# Patient Record
Sex: FEMALE | Race: BLACK OR AFRICAN AMERICAN | NOT HISPANIC OR LATINO | Employment: OTHER | URBAN - NONMETROPOLITAN AREA
[De-identification: names, ages, dates, MRNs, and addresses within clinical notes are randomized per-mention and may not be internally consistent; named-entity substitution may affect disease eponyms.]

---

## 2017-01-11 ENCOUNTER — TELEPHONE (OUTPATIENT)
Dept: GASTROENTEROLOGY | Facility: CLINIC | Age: 72
End: 2017-01-11

## 2017-01-11 NOTE — TELEPHONE ENCOUNTER
----- Message from [facility] sent at 12/30/2016 12:23 PM CST -----  Regarding: RECALL  SPOKE WITH PT. SHE IS OUT OF TOWN RIGHT NOW AND WILL CALL WHEN SHE RETURNS TO SET UP AN OV.

## 2017-04-18 ENCOUNTER — OFFICE VISIT (OUTPATIENT)
Dept: GASTROENTEROLOGY | Facility: CLINIC | Age: 72
End: 2017-04-18

## 2017-04-18 VITALS
HEART RATE: 70 BPM | HEIGHT: 65 IN | WEIGHT: 275 LBS | DIASTOLIC BLOOD PRESSURE: 62 MMHG | SYSTOLIC BLOOD PRESSURE: 112 MMHG | TEMPERATURE: 97 F | BODY MASS INDEX: 45.82 KG/M2 | OXYGEN SATURATION: 97 %

## 2017-04-18 DIAGNOSIS — K63.5 COLON POLYPS: Primary | ICD-10-CM

## 2017-04-18 DIAGNOSIS — Z86.718 HISTORY OF DVT IN ADULTHOOD: ICD-10-CM

## 2017-04-18 DIAGNOSIS — R10.13 EPIGASTRIC PAIN: ICD-10-CM

## 2017-04-18 PROBLEM — K62.1 ANORECTAL POLYP: Status: RESOLVED | Noted: 2017-04-18 | Resolved: 2017-04-18

## 2017-04-18 PROBLEM — K21.9 GASTROESOPHAGEAL REFLUX DISEASE WITHOUT ESOPHAGITIS: Status: RESOLVED | Noted: 2017-04-18 | Resolved: 2017-04-18

## 2017-04-18 PROBLEM — K62.0 ANORECTAL POLYP: Status: ACTIVE | Noted: 2017-04-18

## 2017-04-18 PROBLEM — K62.1 ANORECTAL POLYP: Status: ACTIVE | Noted: 2017-04-18

## 2017-04-18 PROBLEM — K21.9 GASTROESOPHAGEAL REFLUX DISEASE WITHOUT ESOPHAGITIS: Status: ACTIVE | Noted: 2017-04-18

## 2017-04-18 PROBLEM — K62.0 ANORECTAL POLYP: Status: RESOLVED | Noted: 2017-04-18 | Resolved: 2017-04-18

## 2017-04-18 PROCEDURE — 99214 OFFICE O/P EST MOD 30 MIN: CPT | Performed by: NURSE PRACTITIONER

## 2017-04-18 RX ORDER — SODIUM, POTASSIUM,MAG SULFATES 17.5-3.13G
2 SOLUTION, RECONSTITUTED, ORAL ORAL ONCE
Qty: 2 BOTTLE | Refills: 0 | Status: SHIPPED | OUTPATIENT
Start: 2017-04-18 | End: 2017-04-18

## 2017-04-18 RX ORDER — HYDROCHLOROTHIAZIDE 25 MG/1
25 TABLET ORAL AS NEEDED
Status: ON HOLD | COMMUNITY
End: 2017-05-31

## 2017-04-18 RX ORDER — FERROUS SULFATE 325(65) MG
325 TABLET ORAL
Status: ON HOLD | COMMUNITY
End: 2017-05-31

## 2017-04-18 NOTE — PROGRESS NOTES
Chief Complaint:   Chief Complaint   Patient presents with   • Colon Cancer Screening     Patient is here today for colon screening. Patient is also having stomach issues too and wanted to have endo as well.         Patient ID: Karen Snell is a 72 y.o. female     History of Present Illness:  This is a very pleasant 72-year-old female who has a history of colon polyps and is due for repeat colonoscopy.  She states that she has not had any diarrhea, constipation, melena or hematochezia.  She denies any fever or chills.  She denies any unintentional weight loss or loss of appetite.  The patient's last colonoscopy was performed on 9/24/13 with colon polyp finding.    The patient also states today that she has been having mid epigastric pain.  She states that she notices that it seems worse if she does not eat.  She states that she feels as though eating helps to stop the pain that she describes as somewhat burning.  She denies any indigestion.  She denies any dysphagia, nausea, vomiting or hematemesis.    Past Medical History:   Diagnosis Date   • Colon polyp    • DVT (deep venous thrombosis)    • GERD (gastroesophageal reflux disease)    • Hypertension    • PE (pulmonary thromboembolism)        Past Surgical History:   Procedure Laterality Date   • CHOLECYSTECTOMY     • COLONOSCOPY  09/24/2013   • JOINT REPLACEMENT           Current Outpatient Prescriptions:   •  buPROPion (WELLBUTRIN) 100 MG tablet, Take 300 mg by mouth 2 (Two) Times a Day., Disp: , Rfl:   •  citalopram (CeleXA) 20 MG tablet, Take 20 mg by mouth Daily., Disp: , Rfl:   •  donepezil (ARICEPT) 10 MG tablet, Take 10 mg by mouth Every Night., Disp: , Rfl:   •  ferrous sulfate 325 (65 FE) MG tablet, Take 325 mg by mouth Daily With Breakfast., Disp: , Rfl:   •  hydrochlorothiazide (HYDRODIURIL) 25 MG tablet, Take 25 mg by mouth As Needed., Disp: , Rfl:   •  nabumetone (RELAFEN) 500 MG tablet, Take 500 mg by mouth 2 (Two) Times a Day As Needed for mild  "pain (1-3)., Disp: , Rfl:   •  pravastatin (PRAVACHOL) 40 MG tablet, Take 40 mg by mouth Daily., Disp: , Rfl:   •  rivaroxaban (XARELTO) 20 MG tablet, Take 20 mg by mouth Daily., Disp: , Rfl:   •  sodium-potassium-magnesium sulfates (SUPREP BOWEL PREP) solution oral solution, Take 2 bottles by mouth 1 (One) Time for 1 dose. Split dose prep as directed by office instructions provided.  2 bottles = one kit., Disp: 2 bottle, Rfl: 0    No Known Allergies    Social History     Social History   • Marital status: Single     Spouse name: N/A   • Number of children: N/A   • Years of education: N/A     Occupational History   • Not on file.     Social History Main Topics   • Smoking status: Never Smoker   • Smokeless tobacco: Not on file   • Alcohol use No   • Drug use: No   • Sexual activity: Defer     Other Topics Concern   • Not on file     Social History Narrative       Family History   Problem Relation Age of Onset   • Breast cancer Neg Hx        Vitals:    04/18/17 1016   BP: 112/62   Pulse: 70   Temp: 97 °F (36.1 °C)   SpO2: 97%   Weight: 275 lb (125 kg)   Height: 65\" (165.1 cm)       Review of Systems:    General:    Present -feeling well   Skin:    Not Present-Rash   HEENT:     Not Present-Acute visual changes or Acute hearing changes   Neck :    Not Present- swollen glands   Genitourinary:      Not Present- burning, frequency, urgency hematuria, dysuria,   Cardiovascular:   Not Present-chest pain, palpitations, or pressure   Respiratory:   Not Present- shortness of breath or cough   Gastrointestinal:  Musculoskeletal:  Neurological:  Psychiatric:   Present as mentioned in the HP    Not Present. Recent gait disturbances.    Not Present-Seizures and weakness in extremities.    Not Present- Anxiety or Depression.       Physical Exam:    General Appearance:    Alert, cooperative, in no acute distress   Psych:    Mood appropriate    Eyes:          conjunctivae and sclerae normal, no   icterus, no pallor   ENMT:    Ears " appear intact with no abnormalities noted oral mucosa moist   Neck:   No adenopathy, supple, trachea midline, no thyromegaly, no   carotid bruit, no JVD    Cardiovascular:    Regular rhythm and normal rate, normal S1 and S2, no            murmur, no gallop, no rub, no click   Gastrointestinal:     Inspection normal.  Normal bowel sounds, no masses, no organomegaly, soft round non-tender, non-distended, no guarding, no rebound or tenderness. No hepatosplenomegaly.   Skin:   No bleeding, bruising or rash   Neurologic:   nonfocal       Lab Results   Component Value Date    WBC 6.34 10/30/2016    WBC 7.43 08/20/2016    WBC 7.23 08/17/2016    HGB 11.3 (L) 10/30/2016    HGB 9.5 (L) 08/20/2016    HGB 9.1 (L) 08/17/2016    HCT 33.8 (L) 10/30/2016    HCT 28.9 (L) 08/20/2016    HCT 27.3 (L) 08/17/2016     10/30/2016     08/20/2016     08/17/2016        Lab Results   Component Value Date    .9 10/30/2016     10/30/2016     08/20/2016    K 4.2 10/30/2016    K 4.2 08/20/2016    K 4.3 08/17/2016     10/30/2016     08/20/2016     08/17/2016    CO2 29.0 10/30/2016    CO2 28 08/20/2016    CO2 28 08/17/2016    BUN 14 10/30/2016    BUN 16 08/20/2016    BUN 14 08/17/2016    CREATININE 0.92 10/30/2016    CREATININE 0.99 08/20/2016    CREATININE 0.97 08/17/2016    BILITOT 0.5 10/30/2016    BILITOT 0.7 08/08/2016    BILITOT 0.4 02/29/2016    ALKPHOS 103 10/30/2016    ALKPHOS 118 08/08/2016    ALKPHOS 99 02/29/2016    ALT 22 10/30/2016    ALT 21 08/08/2016    ALT 20 02/29/2016    AST 21 10/30/2016    AST 23 08/08/2016    AST 38 02/29/2016    GLUCOSE 123 (H) 10/30/2016    GLUCOSE 94 08/20/2016    GLUCOSE 94 08/17/2016       Lab Results   Component Value Date    INR 1.51 (H) 10/30/2016    INR 1.03 08/08/2016    INR 1.01 02/29/2016       Assessment and Plan:    Karen was seen today for colon cancer screening.    Diagnoses and all orders for this visit:    Colon polyps  -     Case  Request; Standing colonoscopy     Epigastric pain  -     Case Request; Standing upper endoscopy    History of DVT in adulthood  Comments:  will need to hold [facility] Giordano    Other orders  -     Implement Anesthesia Orders Day of Procedure; Standing  -     Obtain Informed Consent; Standing  -     Verify Informed Consent; Standing  -     Verify bowel prep was successful; Standing  -     sodium-potassium-magnesium sulfates (SUPREP BOWEL PREP) solution oral solution; Take 2 bottles by mouth 1 (One) Time for 1 dose. Split dose prep as directed by office instructions provided.  2 bottles = one kit.      There are no Patient Instructions on file for this visit.    Next follow-up appointment   The risks, benefits, and alternatives of endoscopy were reviewed with the patient today.  Risks including perforation, with or without dilation, possibly requiring surgery.  Additional risks include risk of bleeding.  There is also the risk of a drug reaction or problems with anesthesia.  This will be discussed with the further by the anesthesia team on the day of the procedure. The benefits include the diagnosis and management of disease of the upper digestive tract.  Alternatives to endoscopy include upper GI series, laboratory testing, radiographic evaluation, or no intervention.  The patient verbalizes understanding and agrees.    The risks, benefits, and alternatives of colonoscopy were reviewed with the patient today.  Risks including perforation of the colon possibly requiring surgery or colostomy.  Additional risks include risk of bleeding from biopsies or removal of colon tissue.  There is also the risk of a drug reaction or problems with anesthesia.  This will be discussed with the further by the anesthesia team on the day of the procedure.  Lastly there is a possibility of missing a colon polyp or cancer.  The benefits include the diagnosis and management of disease of the colon and rectum.  Alternatives  to colonoscopy include barium enema, laboratory testing, radiographic evaluation, or no intervention.  The patient verbalizes understanding and agrees.    EMR Dragon/Transcription disclaimer:  Much of this encounter note is an electronic transcription/translation of spoken language to printed text. The electronic translation of spoken language may permit erroneous, or at times, nonsensical words or phrases to be inadvertently transcribed; although I have reviewed the note for such errors, some may still exist.

## 2017-04-19 ENCOUNTER — TELEPHONE (OUTPATIENT)
Dept: GASTROENTEROLOGY | Facility: CLINIC | Age: 72
End: 2017-04-19

## 2017-04-19 NOTE — TELEPHONE ENCOUNTER
I received Lorenzoto clearance from BJ[facility] Walton. I have scanned this to you under media in her chart.

## 2017-04-19 NOTE — TELEPHONE ENCOUNTER
OK to schedule procedure now that clearance to hold antiplatelet/anticoagulatant has been obtained.    Jenna Page MD

## 2017-04-24 NOTE — TELEPHONE ENCOUNTER
Scheduled endo/colon for 5/31. Explained that the last dose of Xarelto will be 5/29. Mailed prep instructions to her.

## 2017-05-31 ENCOUNTER — HOSPITAL ENCOUNTER (OUTPATIENT)
Facility: HOSPITAL | Age: 72
Setting detail: OBSERVATION
Discharge: HOME | End: 2017-06-01
Attending: INTERNAL MEDICINE | Admitting: INTERNAL MEDICINE

## 2017-05-31 ENCOUNTER — HOSPITAL ENCOUNTER (OUTPATIENT)
Facility: HOSPITAL | Age: 72
Setting detail: HOSPITAL OUTPATIENT SURGERY
Discharge: HOME | End: 2017-05-31
Attending: INTERNAL MEDICINE | Admitting: INTERNAL MEDICINE

## 2017-05-31 ENCOUNTER — TELEPHONE (OUTPATIENT)
Dept: GASTROENTEROLOGY | Facility: CLINIC | Age: 72
End: 2017-05-31

## 2017-05-31 ENCOUNTER — APPOINTMENT (OUTPATIENT)
Dept: GENERAL RADIOLOGY | Facility: HOSPITAL | Age: 72
End: 2017-05-31

## 2017-05-31 ENCOUNTER — APPOINTMENT (OUTPATIENT)
Dept: CT IMAGING | Facility: HOSPITAL | Age: 72
End: 2017-05-31

## 2017-05-31 VITALS
RESPIRATION RATE: 20 BRPM | HEIGHT: 64 IN | DIASTOLIC BLOOD PRESSURE: 107 MMHG | SYSTOLIC BLOOD PRESSURE: 146 MMHG | BODY MASS INDEX: 46.61 KG/M2 | HEART RATE: 95 BPM | OXYGEN SATURATION: 96 % | WEIGHT: 273 LBS | TEMPERATURE: 96.8 F

## 2017-05-31 DIAGNOSIS — R41.89 COGNITIVE AND BEHAVIORAL CHANGES: ICD-10-CM

## 2017-05-31 DIAGNOSIS — R46.89 COGNITIVE AND BEHAVIORAL CHANGES: ICD-10-CM

## 2017-05-31 DIAGNOSIS — R41.82 ALTERED MENTAL STATUS, UNSPECIFIED ALTERED MENTAL STATUS TYPE: Primary | ICD-10-CM

## 2017-05-31 LAB
ALBUMIN SERPL-MCNC: 4.2 G/DL (ref 3.5–5)
ALBUMIN/GLOB SERPL: 1.2 G/DL (ref 1.1–2.5)
ALP SERPL-CCNC: 115 U/L (ref 24–120)
ALT SERPL W P-5'-P-CCNC: 19 U/L (ref 0–54)
AMYLASE SERPL-CCNC: 87 U/L (ref 30–110)
ANION GAP SERPL CALCULATED.3IONS-SCNC: 13 MMOL/L (ref 4–13)
APTT PPP: 30.3 SECONDS (ref 24.1–34.8)
AST SERPL-CCNC: 37 U/L (ref 7–45)
BACTERIA UR QL AUTO: ABNORMAL /HPF
BASOPHILS # BLD AUTO: 0.02 10*3/MM3 (ref 0–0.2)
BASOPHILS NFR BLD AUTO: 0.4 % (ref 0–2)
BILIRUB SERPL-MCNC: 0.9 MG/DL (ref 0.1–1)
BILIRUB UR QL STRIP: NEGATIVE
BUN BLD-MCNC: 11 MG/DL (ref 5–21)
BUN/CREAT SERPL: 11.3 (ref 7–25)
CALCIUM SPEC-SCNC: 9.7 MG/DL (ref 8.4–10.4)
CHLORIDE SERPL-SCNC: 104 MMOL/L (ref 98–110)
CLARITY UR: CLEAR
CO2 SERPL-SCNC: 25 MMOL/L (ref 24–31)
COLOR UR: YELLOW
CREAT BLD-MCNC: 0.97 MG/DL (ref 0.5–1.4)
CRP SERPL-MCNC: <0.5 MG/DL (ref 0–0.99)
DEPRECATED RDW RBC AUTO: 38.9 FL (ref 40–54)
EOSINOPHIL # BLD AUTO: 0.09 10*3/MM3 (ref 0–0.7)
EOSINOPHIL NFR BLD AUTO: 1.6 % (ref 0–4)
ERYTHROCYTE [DISTWIDTH] IN BLOOD BY AUTOMATED COUNT: 13.5 % (ref 12–15)
GFR SERPL CREATININE-BSD FRML MDRD: 69 ML/MIN/1.73
GLOBULIN UR ELPH-MCNC: 3.5 GM/DL
GLUCOSE BLD-MCNC: 105 MG/DL (ref 70–100)
GLUCOSE BLDC GLUCOMTR-MCNC: 112 MG/DL (ref 70–130)
GLUCOSE UR STRIP-MCNC: NEGATIVE MG/DL
HCT VFR BLD AUTO: 34.2 % (ref 37–47)
HGB BLD-MCNC: 11.5 G/DL (ref 12–16)
HGB UR QL STRIP.AUTO: ABNORMAL
HOLD SPECIMEN: NORMAL
HOLD SPECIMEN: NORMAL
HYALINE CASTS UR QL AUTO: ABNORMAL /LPF
IMM GRANULOCYTES # BLD: 0.03 10*3/MM3 (ref 0–0.03)
IMM GRANULOCYTES NFR BLD: 0.5 % (ref 0–5)
INR PPP: 0.97 (ref 0.91–1.09)
KETONES UR QL STRIP: NEGATIVE
LEUKOCYTE ESTERASE UR QL STRIP.AUTO: NEGATIVE
LIPASE SERPL-CCNC: 115 U/L (ref 23–203)
LYMPHOCYTES # BLD AUTO: 1.5 10*3/MM3 (ref 0.72–4.86)
LYMPHOCYTES NFR BLD AUTO: 27.3 % (ref 15–45)
MCH RBC QN AUTO: 26.9 PG (ref 28–32)
MCHC RBC AUTO-ENTMCNC: 33.6 G/DL (ref 33–36)
MCV RBC AUTO: 80.1 FL (ref 82–98)
MONOCYTES # BLD AUTO: 0.26 10*3/MM3 (ref 0.19–1.3)
MONOCYTES NFR BLD AUTO: 4.7 % (ref 4–12)
NEUTROPHILS # BLD AUTO: 3.59 10*3/MM3 (ref 1.87–8.4)
NEUTROPHILS NFR BLD AUTO: 65.5 % (ref 39–78)
NITRITE UR QL STRIP: NEGATIVE
NT-PROBNP SERPL-MCNC: 226 PG/ML (ref 0–900)
PH UR STRIP.AUTO: 7.5 [PH] (ref 5–8)
PLATELET # BLD AUTO: 229 10*3/MM3 (ref 130–400)
PMV BLD AUTO: 9.8 FL (ref 6–12)
POTASSIUM BLD-SCNC: 4.7 MMOL/L (ref 3.5–5.3)
PROT SERPL-MCNC: 7.7 G/DL (ref 6.3–8.7)
PROT UR QL STRIP: NEGATIVE
PROTHROMBIN TIME: 13.2 SECONDS (ref 11.9–14.6)
RBC # BLD AUTO: 4.27 10*6/MM3 (ref 4.2–5.4)
RBC # UR: ABNORMAL /HPF
REF LAB TEST METHOD: ABNORMAL
SODIUM BLD-SCNC: 142 MMOL/L (ref 135–145)
SP GR UR STRIP: <=1.005 (ref 1–1.03)
SQUAMOUS #/AREA URNS HPF: ABNORMAL /HPF
TROPONIN I SERPL-MCNC: <0.012 NG/ML (ref 0–0.03)
UROBILINOGEN UR QL STRIP: ABNORMAL
WBC NRBC COR # BLD: 5.49 10*3/MM3 (ref 4.8–10.8)
WBC UR QL AUTO: ABNORMAL /HPF
WHOLE BLOOD HOLD SPECIMEN: NORMAL

## 2017-05-31 PROCEDURE — G0463 HOSPITAL OUTPT CLINIC VISIT: HCPCS | Performed by: INTERNAL MEDICINE

## 2017-05-31 PROCEDURE — 93010 ELECTROCARDIOGRAM REPORT: CPT | Performed by: INTERNAL MEDICINE

## 2017-05-31 PROCEDURE — 83690 ASSAY OF LIPASE: CPT | Performed by: NURSE PRACTITIONER

## 2017-05-31 PROCEDURE — 96361 HYDRATE IV INFUSION ADD-ON: CPT

## 2017-05-31 PROCEDURE — G0378 HOSPITAL OBSERVATION PER HR: HCPCS

## 2017-05-31 PROCEDURE — 99285 EMERGENCY DEPT VISIT HI MDM: CPT

## 2017-05-31 PROCEDURE — 82962 GLUCOSE BLOOD TEST: CPT

## 2017-05-31 PROCEDURE — 96360 HYDRATION IV INFUSION INIT: CPT

## 2017-05-31 PROCEDURE — 85025 COMPLETE CBC W/AUTO DIFF WBC: CPT | Performed by: NURSE PRACTITIONER

## 2017-05-31 PROCEDURE — P9612 CATHETERIZE FOR URINE SPEC: HCPCS

## 2017-05-31 PROCEDURE — 80053 COMPREHEN METABOLIC PANEL: CPT | Performed by: NURSE PRACTITIONER

## 2017-05-31 PROCEDURE — 82150 ASSAY OF AMYLASE: CPT | Performed by: NURSE PRACTITIONER

## 2017-05-31 PROCEDURE — 70450 CT HEAD/BRAIN W/O DYE: CPT

## 2017-05-31 PROCEDURE — 25810000003 DEXTROSE-NACL PER 500 ML: Performed by: INTERNAL MEDICINE

## 2017-05-31 PROCEDURE — 86140 C-REACTIVE PROTEIN: CPT | Performed by: NURSE PRACTITIONER

## 2017-05-31 PROCEDURE — 93005 ELECTROCARDIOGRAM TRACING: CPT | Performed by: NURSE PRACTITIONER

## 2017-05-31 PROCEDURE — 71010 HC CHEST PA OR AP: CPT

## 2017-05-31 PROCEDURE — 84484 ASSAY OF TROPONIN QUANT: CPT | Performed by: INTERNAL MEDICINE

## 2017-05-31 PROCEDURE — 81001 URINALYSIS AUTO W/SCOPE: CPT | Performed by: NURSE PRACTITIONER

## 2017-05-31 PROCEDURE — 83880 ASSAY OF NATRIURETIC PEPTIDE: CPT | Performed by: NURSE PRACTITIONER

## 2017-05-31 PROCEDURE — 85730 THROMBOPLASTIN TIME PARTIAL: CPT | Performed by: NURSE PRACTITIONER

## 2017-05-31 PROCEDURE — 85610 PROTHROMBIN TIME: CPT | Performed by: NURSE PRACTITIONER

## 2017-05-31 RX ORDER — BUPROPION HYDROCHLORIDE 300 MG/1
150 TABLET ORAL DAILY
Status: ON HOLD | COMMUNITY
End: 2019-01-01

## 2017-05-31 RX ORDER — DONEPEZIL HYDROCHLORIDE 10 MG/1
10 TABLET, FILM COATED ORAL NIGHTLY
Status: DISCONTINUED | OUTPATIENT
Start: 2017-05-31 | End: 2017-06-01 | Stop reason: HOSPADM

## 2017-05-31 RX ORDER — BUPROPION HYDROCHLORIDE 150 MG/1
300 TABLET ORAL DAILY
Status: DISCONTINUED | OUTPATIENT
Start: 2017-05-31 | End: 2017-06-01 | Stop reason: HOSPADM

## 2017-05-31 RX ORDER — ATORVASTATIN CALCIUM 10 MG/1
10 TABLET, FILM COATED ORAL DAILY
Status: DISCONTINUED | OUTPATIENT
Start: 2017-06-01 | End: 2017-06-01 | Stop reason: HOSPADM

## 2017-05-31 RX ORDER — DEXTROSE AND SODIUM CHLORIDE 5; .9 G/100ML; G/100ML
75 INJECTION, SOLUTION INTRAVENOUS CONTINUOUS
Status: DISCONTINUED | OUTPATIENT
Start: 2017-05-31 | End: 2017-06-01 | Stop reason: HOSPADM

## 2017-05-31 RX ORDER — SODIUM CHLORIDE 0.9 % (FLUSH) 0.9 %
10 SYRINGE (ML) INJECTION AS NEEDED
Status: DISCONTINUED | OUTPATIENT
Start: 2017-05-31 | End: 2017-06-01 | Stop reason: HOSPADM

## 2017-05-31 RX ORDER — CITALOPRAM 20 MG/1
20 TABLET ORAL DAILY
Status: DISCONTINUED | OUTPATIENT
Start: 2017-05-31 | End: 2017-06-01 | Stop reason: HOSPADM

## 2017-05-31 RX ORDER — SODIUM CHLORIDE 0.9 % (FLUSH) 0.9 %
1-10 SYRINGE (ML) INJECTION AS NEEDED
Status: DISCONTINUED | OUTPATIENT
Start: 2017-05-31 | End: 2017-06-01 | Stop reason: HOSPADM

## 2017-05-31 RX ADMIN — DONEPEZIL HYDROCHLORIDE 10 MG: 10 TABLET, FILM COATED ORAL at 20:51

## 2017-05-31 RX ADMIN — DEXTROSE AND SODIUM CHLORIDE 75 ML/HR: 5; 900 INJECTION, SOLUTION INTRAVENOUS at 20:50

## 2017-05-31 RX ADMIN — BUPROPION HYDROCHLORIDE 300 MG: 150 TABLET, EXTENDED RELEASE ORAL at 20:50

## 2017-05-31 RX ADMIN — RIVAROXABAN 20 MG: 20 TABLET, FILM COATED ORAL at 20:51

## 2017-05-31 RX ADMIN — SODIUM CHLORIDE 500 ML: 9 INJECTION, SOLUTION INTRAVENOUS at 08:31

## 2017-05-31 RX ADMIN — Medication 10 ML: at 20:51

## 2017-05-31 RX ADMIN — CITALOPRAM 20 MG: 20 TABLET ORAL at 20:51

## 2017-06-01 ENCOUNTER — APPOINTMENT (OUTPATIENT)
Dept: CARDIOLOGY | Facility: HOSPITAL | Age: 72
End: 2017-06-01
Attending: INTERNAL MEDICINE

## 2017-06-01 ENCOUNTER — APPOINTMENT (OUTPATIENT)
Dept: ULTRASOUND IMAGING | Facility: HOSPITAL | Age: 72
End: 2017-06-01

## 2017-06-01 VITALS
HEIGHT: 64 IN | SYSTOLIC BLOOD PRESSURE: 123 MMHG | BODY MASS INDEX: 44.94 KG/M2 | OXYGEN SATURATION: 98 % | RESPIRATION RATE: 18 BRPM | HEART RATE: 82 BPM | TEMPERATURE: 98.1 F | DIASTOLIC BLOOD PRESSURE: 62 MMHG | WEIGHT: 263.2 LBS

## 2017-06-01 LAB
ARTICHOKE IGE QN: 130 MG/DL (ref 0–99)
CHOLEST SERPL-MCNC: 225 MG/DL (ref 130–200)
GLUCOSE BLDC GLUCOMTR-MCNC: 114 MG/DL (ref 70–130)
HBA1C MFR BLD: 5.7 %
HDLC SERPL-MCNC: 60 MG/DL
LDLC/HDLC SERPL: 2.51 {RATIO}
TRIGL SERPL-MCNC: 72 MG/DL (ref 0–149)

## 2017-06-01 PROCEDURE — G0378 HOSPITAL OBSERVATION PER HR: HCPCS

## 2017-06-01 PROCEDURE — 93880 EXTRACRANIAL BILAT STUDY: CPT | Performed by: SURGERY

## 2017-06-01 PROCEDURE — G9175 SPEECH LANG GOAL STATUS: HCPCS

## 2017-06-01 PROCEDURE — 80061 LIPID PANEL: CPT | Performed by: INTERNAL MEDICINE

## 2017-06-01 PROCEDURE — 92523 SPEECH SOUND LANG COMPREHEN: CPT

## 2017-06-01 PROCEDURE — 93306 TTE W/DOPPLER COMPLETE: CPT | Performed by: INTERNAL MEDICINE

## 2017-06-01 PROCEDURE — G9174 SPEECH LANG CURRENT STATUS: HCPCS

## 2017-06-01 PROCEDURE — 83036 HEMOGLOBIN GLYCOSYLATED A1C: CPT | Performed by: INTERNAL MEDICINE

## 2017-06-01 PROCEDURE — 93306 TTE W/DOPPLER COMPLETE: CPT

## 2017-06-01 PROCEDURE — 82962 GLUCOSE BLOOD TEST: CPT

## 2017-06-01 PROCEDURE — 93880 EXTRACRANIAL BILAT STUDY: CPT

## 2017-06-01 PROCEDURE — 96361 HYDRATE IV INFUSION ADD-ON: CPT

## 2017-06-01 RX ADMIN — BUPROPION HYDROCHLORIDE 300 MG: 150 TABLET, EXTENDED RELEASE ORAL at 09:42

## 2017-06-01 RX ADMIN — ATORVASTATIN CALCIUM 10 MG: 10 TABLET, FILM COATED ORAL at 09:43

## 2017-06-01 RX ADMIN — RIVAROXABAN 20 MG: 20 TABLET, FILM COATED ORAL at 09:42

## 2017-06-01 RX ADMIN — CITALOPRAM 20 MG: 20 TABLET ORAL at 09:43

## 2017-06-01 NOTE — THERAPY DISCHARGE NOTE
Acute Care - Speech Language Pathology Discharge Summary  [facility]       Patient Name: Karen Snell  : 1945  MRN: [id number]    Today's Date: 2017       Date of Referral to SLP: (P) 17           Admit Date: 2017      SLP Recommendation and Plan  Cognitive therapy    Visit Dx:    ICD-10-CM ICD-9-CM   1. Altered mental status, unspecified altered mental status type R41.82 780.97   2. Cognitive and behavioral changes R41.89 799.59    R46.89 312.9               Time Calculation- SLP       17 1025          Time Calculation- SLP    SLP Start Time (P)  0905  -CM      SLP Stop Time (P)  1020  -CM      SLP Time Calculation (min) (P)  75 min  -CM      SLP Received On (P)  17  -CM        User Key  (r) = Recorded By, (t) = Taken By, (c) = Cosigned By    Initials Name Provider Type    JULIO C Vee, Speech Therapy Student Speech Therapy Student                   SLP Goals       17 1618 17 1009       Cognitive Linguistic- Improve Safety and Awareness    Cognitive Linguistic Improve Safety and Awareness- SLP, Date Established  (P)  17  -     Cognitive Linguistic Improve Safety and Awareness- SLP, Time to Achieve  (P)  by discharge  -CM     Cognitive Linguistic Improve Safety and Awareness- SLP, Activity Level  (P)  Patient will improve awareness of basic personal information for increased safety in environment;Patient will improve memory skills for increased safety in environment;Patient will improve functional problem solving skills for increased safety in environment  -CM     Cognitive Linguistic Improve Safety and Awareness- SLP, Date Goal Reviewed  (P)  17  -CM     Cognitive Linguistic Improve Safety and Awareness- SLP, Outcome goal not met  -MB (P)  goal ongoing  -CM     Cognitive Linguistic Improve Safety and Awareness- SLP, Reason Goal Not Met discharged from facility  -MB        User Key  (r) = Recorded By, (t) = Taken By, (c) = Cosigned By     Initials Name Provider Type    BLADIMIR Patel CCC-SLP Speech and Language Pathologist    JULIO C Vee, Speech Therapy Student Speech Therapy Student                  SLP Discharge Summary  Anticipated Discharge Disposition: home  Reason for Discharge: Discharge from facility  Outcomes Achieved: Discharge from facility occurred on same date as evluation  Discharge Destination: Home      Aroldo Patel CCC-SLP  6/1/2017

## 2017-06-01 NOTE — PLAN OF CARE
Problem: Patient Care Overview (Adult)  Goal: Plan of Care Review  Outcome: Ongoing (interventions implemented as appropriate)    06/01/17 0402 06/01/17 1009   Coping/Psychosocial Response Interventions   Plan Of Care Reviewed With patient --    Patient Care Overview   Progress improving --    Outcome Evaluation   Outcome Summary/Follow up Plan --   Pt alert and upright in chair. Speech-language/cognitive eval completed. ST noted pt difficulty with immediate recall, and higher level problem solving. Pt required ST to repeat questions on several occassions. Pt reported having difficutly with with her memory and ADLs. Pt's two nieces confirmed. ST to continue to follow.         Problem: Inpatient SLP  Goal: Cognitive-linguistic-Patient will improve Cognitive-linguistic skills to improve safety and safety awareness in environment  Outcome: Ongoing (interventions implemented as appropriate)    06/01/17 1009   Cognitive Linguistic- Improve Safety and Awareness   Cognitive Linguistic Improve Safety and Awareness- SLP, Date Established 06/01/17   Cognitive Linguistic Improve Safety and Awareness- SLP, Time to Achieve by discharge   Cognitive Linguistic Improve Safety and Awareness- SLP, Activity Level Patient will improve awareness of basic personal information for increased safety in environment;Patient will improve memory skills for increased safety in environment;Patient will improve functional problem solving skills for increased safety in environment   Cognitive Linguistic Improve Safety and Awareness- SLP, Date Goal Reviewed 06/01/17   Cognitive Linguistic Improve Safety and Awareness- SLP, Outcome goal ongoing

## 2017-06-01 NOTE — DISCHARGE SUMMARY
[facility]  DISCHARGE SUMMARY       Date of Admission: 5/31/2017  Date of Discharge:  6/1/2017  Primary Care Physician: BJORN Barksdale    Presenting Problem/History of Present Illness:  Altered mental status, unspecified altered mental status type [R41.82]     Final Discharge Diagnoses:  Hospital Problem List     Altered mental status        Discharge Diagnosis:  1.  Altered Mental Status/Confusion - symptoms resolved  2.  History of PE and DVT - on outpatient anticoagulation with Xarelto  3.  Hyperlipidemia  4.  Chronic abdominal pain - was prepping for outpatient colonoscopy as part of abdominal workup with Dr. Page of gastroenterology     Procedures Performed:   Imaging Results (last 72 hours)     Procedure Component Value Units Date/Time    XR Chest 1 View [[id number]] Collected:  05/31/17 0847     Updated:  05/31/17 0850    Narrative:       History: Confusion     Chest frontal, 0828 hours:     Comparison study dated 10/30/2016. Lungs are clear with heart size felt  to be upper normal. There is no evidence of failure or pneumonia     There are some degenerative changes in the spine     IMPRESSIONS:. No definite active disease.  This report was finalized on 05/31/2017 08:47 by Dr. Hernán Rodriguez MD.    CT Head Without Contrast [[id number]] Collected:  05/31/17 0939     Updated:  05/31/17 0944    Narrative:       CT BRAIN without contrast 5/31/2017 9:28 AM CDT     HISTORY: Confusion     COMPARISON: None      DLP: 627 mGy cm     TECHNIQUE: Serial axial tomographic images of the brain were obtained  without the use of intravenous contrast.      FINDINGS:   The midline structures are nondisplaced. There is mild cerebral and  cerebellar volume loss, with an associated increase in the prominence of  the ventricles and sulci. The basilar cisterns are normal in size and  configuration. There is no evidence of intracranial hemorrhage or  mass-effect. There are  no abnormal extra-axial fluid collections. There  is no evidence of tonsillar herniation.      The included orbits and their contents are unremarkable. The visualized  paranasal sinuses, mastoid air cells and middle ear cavities are clear.  The visualized osseous structures and overlying soft tissues of the  skull and face are intact.        Impression:       Mild cerebral and cerebellar volume loss but no evidence of acute  intracranial process.        This report was finalized on 05/31/2017 09:41 by Dr. Frandy Butler MD.    US Carotid Bilateral [[id number]] Updated:  06/01/17 0724        Noninvasive carotid studies: < 50% bilaterally    Echocardiogram:  Results currently pending and if negative anticipate patient will be eligible for discharge    Pertinent Test Results:   Lab Results (last 72 hours)     Procedure Component Value Units Date/Time    Comprehensive Metabolic Panel [[id number]]  (Abnormal) Collected:  05/31/17 0740    Specimen:  Blood from Arm, Left Updated:  05/31/17 0809     Glucose 105 (H) mg/dL      BUN 11 mg/dL      Creatinine 0.97 mg/dL      Sodium 142 mmol/L      Potassium 4.7 mmol/L      Chloride 104 mmol/L      CO2 25.0 mmol/L      Calcium 9.7 mg/dL      Total Protein 7.7 g/dL      Albumin 4.20 g/dL      ALT (SGPT) 19 U/L      AST (SGOT) 37 U/L      Alkaline Phosphatase 115 U/L      Total Bilirubin 0.9 mg/dL      eGFR  African Amer 69 mL/min/1.73      Globulin 3.5 gm/dL      A/G Ratio 1.2 g/dL      BUN/Creatinine Ratio 11.3     Anion Gap 13.0 mmol/L     Narrative:       The MDRD GFR formula is only valid for adults with stable renal function between ages 18 and 70.    Amylase [[id number]]  (Normal) Collected:  05/31/17 0740    Specimen:  Blood from Arm, Left Updated:  05/31/17 0809     Amylase 87 U/L     Lipase [[id number]]  (Normal) Collected:  05/31/17 0740    Specimen:  Blood from Arm, Left Updated:  05/31/17 0809     Lipase 115 U/L     C-reactive Protein [[id number]]  (Normal) Collected:   05/31/17 0740    Specimen:  Blood from Arm, Left Updated:  05/31/17 0809     C-Reactive Protein <0.50 mg/dL     CBC & Differential [[id number]] Collected:  05/31/17 0758    Specimen:  Blood Updated:  05/31/17 0812    Narrative:       The following orders were created for panel order CBC & Differential.  Procedure                               Abnormality         Status                     ---------                               -----------         ------                     CBC Auto Differential[[id number]]        Abnormal            Final result                 Please view results for these tests on the individual orders.    CBC Auto Differential [[id number]]  (Abnormal) Collected:  05/31/17 0758    Specimen:  Blood Updated:  05/31/17 0812     WBC 5.49 10*3/mm3      RBC 4.27 10*6/mm3      Hemoglobin 11.5 (L) g/dL      Hematocrit 34.2 (L) %      MCV 80.1 (L) fL      MCH 26.9 (L) pg      MCHC 33.6 g/dL      RDW 13.5 %      RDW-SD 38.9 (L) fl      MPV 9.8 fL      Platelets 229 10*3/mm3      Neutrophil % 65.5 %      Lymphocyte % 27.3 %      Monocyte % 4.7 %      Eosinophil % 1.6 %      Basophil % 0.4 %      Immature Grans % 0.5 %      Neutrophils, Absolute 3.59 10*3/mm3      Lymphocytes, Absolute 1.50 10*3/mm3      Monocytes, Absolute 0.26 10*3/mm3      Eosinophils, Absolute 0.09 10*3/mm3      Basophils, Absolute 0.02 10*3/mm3      Immature Grans, Absolute 0.03 10*3/mm3     BNP [[id number]]  (Normal) Collected:  05/31/17 0740    Specimen:  Blood from Arm, Left Updated:  05/31/17 0818     proBNP 226.0 pg/mL     Troponin [[id number]]  (Normal) Collected:  05/31/17 0740    Specimen:  Blood from Arm, Left Updated:  05/31/17 0818     Troponin I <0.012 ng/mL     Protime-INR [[id number]]  (Normal) Collected:  05/31/17 0740    Specimen:  Blood from Arm, Left Updated:  05/31/17 0824     Protime 13.2 Seconds      INR 0.97    aPTT [[id number]]  (Normal) Collected:  05/31/17 0740    Specimen:  Blood from Arm, Left Updated:  05/31/17 0858      PTT 30.3 seconds     Urinalysis With / Culture If Indicated [[id number]]  (Abnormal) Collected:  05/31/17 0831    Specimen:  Urine from Urine, Catheter Updated:  05/31/17 0850     Color, UA Yellow     Appearance, UA Clear     pH, UA 7.5     Specific Gravity, UA <=1.005     Glucose, UA Negative     Ketones, UA Negative     Bilirubin, UA Negative     Blood, UA Trace (A)     Protein, UA Negative     Leuk Esterase, UA Negative     Nitrite, UA Negative     Urobilinogen, UA 0.2 E.U./dL    Urinalysis, Microscopic Only [[id number]]  (Abnormal) Collected:  05/31/17 0831    Specimen:  Urine from Urine, Catheter Updated:  05/31/17 0850     RBC, UA 0-2 (A) /HPF      WBC, UA None Seen /HPF      Bacteria, UA None Seen /HPF      Squamous Epithelial Cells, UA None Seen /HPF      Hyaline Casts, UA None Seen /LPF      Methodology Automated Microscopy    Light Blue Top [[id number]] Collected:  05/31/17 0740    Specimen:  Blood from Arm, Left Updated:  05/31/17 0901     Extra Tube hold for add-on      Auto resulted       Green Top (Gel) [[id number]] Collected:  05/31/17 0740    Specimen:  Blood from Arm, Left Updated:  05/31/17 0901     Extra Tube Hold for add-ons.      Auto resulted.       Red Top [[id number]] Collected:  05/31/17 0740    Specimen:  Blood from Arm, Left Updated:  05/31/17 0901     Extra Tube Hold for add-ons.      Auto resulted.       [place] Draw [[id number]] Collected:  05/31/17 0714    Specimen:  Blood Updated:  05/31/17 1503    Narrative:       The following orders were created for panel order [place] Draw.  Procedure                               Abnormality         Status                     ---------                               -----------         ------                     Light Blue Top[[id number]]                                    Final result               Green Top (Gel)[[id number]]                                   Final result               Lavender Top[[id number]]                                                   "               Red Top[[id number]]                                           Final result               Green Top (No Gel)[[id number]]                                                             Please view results for these tests on the individual orders.    POC Glucose Fingerstick [[id number]]  (Normal) Collected:  06/01/17 0008    Specimen:  Blood Updated:  06/01/17 0026     Glucose 114 mg/dL       : [id number] Jose DeannaMeter ID: [id number]       Lipid Panel [[id number]]  (Abnormal) Collected:  06/01/17 0423    Specimen:  Blood Updated:  06/01/17 0525     Total Cholesterol 225 (H) mg/dL      Triglycerides 72 mg/dL      HDL Cholesterol 60 mg/dL      LDL Cholesterol  130 (H) mg/dL      LDL/HDL Ratio 2.51    Hemoglobin A1c [[id number]] Collected:  06/01/17 0423    Specimen:  Blood Updated:  06/01/17 0654     Hemoglobin A1C 5.7 %     Narrative:       Less than 6.0           Non-Diabetic Range  6.0-7.0                 ADA Therapeutic Target  Greater than 7.0        Action Suggested        Chief Complaint on Day of Discharge: \"I feel fine.\"  \"I was ready to go home yesterday\"    Hospital Course:  The patient is a 72 y.o. female who presented to [facility] with Altered mental status and confusion.  Patient reports a history of chronic abdominal pain, and is being worked up on an outpatient basis by Dr. Page of gastroenterology.  Patient was going through colon prep in anticipation of a colonoscopy yesterday morning, and evidently started having some underlying confusion.  She reports that she was supposed to drink 2 bottles of prep, 1 the night before, with plans to wake up around 4:00 in the morning to start her second prep.  It was at that time that family noticed confusion.  She denies ever having any focal motor or sensory deficits.  She reported no known headache.  No vision change.  No swallowing difficulty.  No speech difficulty.  No presyncopal or syncopal symptoms.  No new pain.  She was brought to " "our facility for further workup and management, and fortunately has had no recurrence of symptoms.      She is sitting up on the side of the bed talking with family this morning.  She reports that she feels like she is back to normal, and thinks that a lot of her confusion was related to being up in the middle the night and going through the prep for colonoscopy.  A CAT scan of the head was obtained which reveals no acute findings.  Noninvasive carotid studies were performed revealing plaque present, but less than 50% bilaterally.  Patient is been maintained on telemetry, and has had no acute events and is been in sinus rhythm.  An echocardiogram has been performed and final results are pending at this time.  Chest x-ray reveals no acute process.  Urinalysis reveals no acute process.  Other laboratory studies are unremarkable and vital signs remained stable.  Patient has been on outpatient anticoagulation with Xarelto even a history of DVT and pulmonary embolism and will resume this medication on an outpatient basis, and hold it per instructions by GI once her colonoscopy has been rescheduled.  I would like her to follow-up with her primary care provider in one week.  All in all, patient reports that she is doing very well, and in fact states \"I was ready to go back home yesterday\".    Condition on Discharge:  stable    Physical Exam on Discharge:  /62 (BP Location: Left arm, Patient Position: Sitting)  Pulse 82  Temp 98.1 °F (36.7 °C) (Temporal Artery )   Resp 18  Ht 64\" (162.6 cm)  Wt 263 lb 3.2 oz (119 kg)  SpO2 98%  BMI 45.18 kg/m2  Physical Exam  No acute distress, sitting up on the side of the bed, has her cane in hand, lungs are clear bilaterally, regular rate and rhythm, stable bilateral lower extremity edema per her report, alert and oriented, no focal neuro deficits at this time.    Discharge Disposition:  Home or Self Care    Discharge Medications:   Karen Snell   Home Medication " Instructions ESTELA:[id number]    Printed on:06/01/17 1023   Medication Information                      buPROPion XL (WELLBUTRIN XL) 300 MG 24 hr tablet  Take 300 mg by mouth Daily.             citalopram (CeleXA) 20 MG tablet  Take 20 mg by mouth Daily.             donepezil (ARICEPT) 10 MG tablet  Take 10 mg by mouth Every Night.             pravastatin (PRAVACHOL) 40 MG tablet  Take 40 mg by mouth Daily.             rivaroxaban (XARELTO) 20 MG tablet  Take 20 mg by mouth Daily.                 Discharge Diet: as tolerated    Activity at Discharge: as tolerated    Discharge Care Plan/Instructions: Please reschedule outpatient colonoscopy with Dr. Page    Follow-up Appointments:   Follow-up with primary care provider Dr. Elizalde in one week.  Follow-up with Dr. Page of gastroenterology on an outpatient basis as well.    Test Results Pending at Discharge: Final results of echocardiogram are pending, and if no acute findings are noted patient will be eligible for discharge today.    Roge Vazquez MD  06/01/17  10:23 AM

## 2017-06-01 NOTE — PLAN OF CARE
Problem: Patient Care Overview (Adult)  Goal: Plan of Care Review  Outcome: Ongoing (interventions implemented as appropriate)    06/01/17 0402   Coping/Psychosocial Response Interventions   Plan Of Care Reviewed With patient   Patient Care Overview   Progress improving   Outcome Evaluation   Outcome Summary/Follow up Plan pt oriented x 4 tonight, VSS, IV infiltrated and unsuccessful to get another started. Plans for US carotids and echo with bubble study today       Goal: Adult Individualization and Mutuality  Outcome: Ongoing (interventions implemented as appropriate)  Goal: Discharge Needs Assessment  Outcome: Ongoing (interventions implemented as appropriate)    Problem: Fall Risk (Adult)  Goal: Identify Related Risk Factors and Signs and Symptoms  Outcome: Ongoing (interventions implemented as appropriate)    06/01/17 0402   Fall Risk   Fall Risk: Related Risk Factors age-related changes;fear of falling;fatigue/slow reaction;gait/mobility problems   Fall Risk: Signs and Symptoms presence of risk factors       Goal: Absence of Falls  Outcome: Outcome(s) achieved Date Met:  06/01/17    Problem: Confusion, Acute (Adult)  Goal: Identify Related Risk Factors and Signs and Symptoms  Outcome: Ongoing (interventions implemented as appropriate)    06/01/17 0402   Confusion, Acute   Related Risk Factors (Acute Confusion) advanced age;dehydration;medication effects;mobility decreased   Signs and Symptoms (Acute Confusion) other (see comments)  (none)       Goal: Cognitive/Functional Impairments Minimized  Outcome: Outcome(s) achieved Date Met:  06/01/17  Goal: Safety  Outcome: Ongoing (interventions implemented as appropriate)

## 2017-06-01 NOTE — PLAN OF CARE
Problem: Inpatient SLP  Goal: Cognitive-linguistic-Patient will improve Cognitive-linguistic skills to improve safety and safety awareness in environment  Outcome: Unable to achieve outcome(s) by discharge Date Met:  06/01/17 06/01/17 1009 06/01/17 1618   Cognitive Linguistic- Improve Safety and Awareness   Cognitive Linguistic Improve Safety and Awareness- SLP, Date Established 06/01/17 --    Cognitive Linguistic Improve Safety and Awareness- SLP, Time to Achieve by discharge --    Cognitive Linguistic Improve Safety and Awareness- SLP, Activity Level Patient will improve awareness of basic personal information for increased safety in environment;Patient will improve memory skills for increased safety in environment;Patient will improve functional problem solving skills for increased safety in environment --    Cognitive Linguistic Improve Safety and Awareness- SLP, Date Goal Reviewed 06/01/17 --    Cognitive Linguistic Improve Safety and Awareness- SLP, Outcome --  goal not met   Cognitive Linguistic Improve Safety and Awareness- SLP, Reason Goal Not Met --  discharged from facility

## 2017-06-02 LAB
BH CV ECHO MEAS - AO MAX PG (FULL): 8.8 MMHG
BH CV ECHO MEAS - AO MAX PG: 11.8 MMHG
BH CV ECHO MEAS - AO MEAN PG (FULL): 4 MMHG
BH CV ECHO MEAS - AO MEAN PG: 5 MMHG
BH CV ECHO MEAS - AO ROOT AREA: 7.5 CM^2
BH CV ECHO MEAS - AO ROOT DIAM: 3.1 CM
BH CV ECHO MEAS - AO V2 MAX: 172 CM/SEC
BH CV ECHO MEAS - AO V2 MEAN: 104 CM/SEC
BH CV ECHO MEAS - AO V2 VTI: 35.3 CM
BH CV ECHO MEAS - AVA(I,A): 1.6 CM^2
BH CV ECHO MEAS - AVA(I,D): 1.6 CM^2
BH CV ECHO MEAS - AVA(V,A): 1.4 CM^2
BH CV ECHO MEAS - AVA(V,D): 1.4 CM^2
BH CV ECHO MEAS - CONTRAST EF 4CH: 57.4 ML/M^2
BH CV ECHO MEAS - EDV(CUBED): 107.5 ML
BH CV ECHO MEAS - EDV(MOD-SP4): 102 ML
BH CV ECHO MEAS - EDV(TEICH): 105.2 ML
BH CV ECHO MEAS - EF(CUBED): 69.2 %
BH CV ECHO MEAS - EF(MOD-SP4): 57.4 %
BH CV ECHO MEAS - EF(TEICH): 60.8 %
BH CV ECHO MEAS - ESV(CUBED): 33.1 ML
BH CV ECHO MEAS - ESV(MOD-SP4): 43.5 ML
BH CV ECHO MEAS - ESV(TEICH): 41.3 ML
BH CV ECHO MEAS - FS: 32.5 %
BH CV ECHO MEAS - IVS/LVPW: 0.93
BH CV ECHO MEAS - IVSD: 0.97 CM
BH CV ECHO MEAS - LA DIMENSION: 3.7 CM
BH CV ECHO MEAS - LA/AO: 1.2
BH CV ECHO MEAS - LAT PEAK E' VEL: 9.5 CM/SEC
BH CV ECHO MEAS - LV MASS(C)D: 167.7 GRAMS
BH CV ECHO MEAS - LV MAX PG: 3 MMHG
BH CV ECHO MEAS - LV MEAN PG: 1 MMHG
BH CV ECHO MEAS - LV V1 MAX: 86.5 CM/SEC
BH CV ECHO MEAS - LV V1 MEAN: 52.3 CM/SEC
BH CV ECHO MEAS - LV V1 VTI: 19.9 CM
BH CV ECHO MEAS - LVIDD: 4.8 CM
BH CV ECHO MEAS - LVIDS: 3.2 CM
BH CV ECHO MEAS - LVLD AP4: 7.1 CM
BH CV ECHO MEAS - LVLS AP4: 5.7 CM
BH CV ECHO MEAS - LVOT AREA (M): 2.8 CM^2
BH CV ECHO MEAS - LVOT AREA: 2.8 CM^2
BH CV ECHO MEAS - LVOT DIAM: 1.9 CM
BH CV ECHO MEAS - LVPWD: 1 CM
BH CV ECHO MEAS - MV A MAX VEL: 69.8 CM/SEC
BH CV ECHO MEAS - MV DEC TIME: 0.15 SEC
BH CV ECHO MEAS - MV E MAX VEL: 78.6 CM/SEC
BH CV ECHO MEAS - MV E/A: 1.1
BH CV ECHO MEAS - SV(AO): 266.4 ML
BH CV ECHO MEAS - SV(CUBED): 74.4 ML
BH CV ECHO MEAS - SV(LVOT): 56.4 ML
BH CV ECHO MEAS - SV(MOD-SP4): 58.5 ML
BH CV ECHO MEAS - SV(TEICH): 63.9 ML
E/E' RATIO: 10
LEFT ATRIUM VOLUME: 96 CM3

## 2017-06-02 NOTE — THERAPY DISCHARGE NOTE
Acute Care - Physical Therapy Discharge Summary  [facility]       Patient Name: Karen Snell  : 1945  MRN: [id number]    Today's Date: 2017                 Admit Date: 2017      PT Recommendation and Plan    Visit Dx:    ICD-10-CM ICD-9-CM   1. Altered mental status, unspecified altered mental status type R41.82 780.97   2. Cognitive and behavioral changes R41.89 799.59    R46.89 312.9                                Goran Williamson, JOSEFINA   2017

## 2017-06-21 ENCOUNTER — PREP FOR SURGERY (OUTPATIENT)
Dept: OTHER | Facility: HOSPITAL | Age: 72
End: 2017-06-21

## 2017-06-21 DIAGNOSIS — R10.13 EPIGASTRIC ABDOMINAL PAIN: ICD-10-CM

## 2017-06-21 DIAGNOSIS — K63.5 COLON POLYP: Primary | ICD-10-CM

## 2017-08-10 ENCOUNTER — ANESTHESIA EVENT (OUTPATIENT)
Dept: GASTROENTEROLOGY | Facility: HOSPITAL | Age: 72
End: 2017-08-10

## 2017-08-10 ENCOUNTER — HOSPITAL ENCOUNTER (OUTPATIENT)
Facility: HOSPITAL | Age: 72
Setting detail: HOSPITAL OUTPATIENT SURGERY
Discharge: HOME | End: 2017-08-10
Attending: INTERNAL MEDICINE | Admitting: INTERNAL MEDICINE

## 2017-08-10 ENCOUNTER — ANESTHESIA (OUTPATIENT)
Dept: GASTROENTEROLOGY | Facility: HOSPITAL | Age: 72
End: 2017-08-10

## 2017-08-10 ENCOUNTER — TELEPHONE (OUTPATIENT)
Dept: GASTROENTEROLOGY | Facility: CLINIC | Age: 72
End: 2017-08-10

## 2017-08-10 VITALS
HEIGHT: 65 IN | TEMPERATURE: 97.2 F | RESPIRATION RATE: 18 BRPM | OXYGEN SATURATION: 97 % | DIASTOLIC BLOOD PRESSURE: 57 MMHG | HEART RATE: 74 BPM | WEIGHT: 269 LBS | SYSTOLIC BLOOD PRESSURE: 143 MMHG | BODY MASS INDEX: 44.82 KG/M2

## 2017-08-10 DIAGNOSIS — R10.13 EPIGASTRIC ABDOMINAL PAIN: ICD-10-CM

## 2017-08-10 DIAGNOSIS — K63.5 COLON POLYP: ICD-10-CM

## 2017-08-10 PROCEDURE — G0105 COLORECTAL SCRN; HI RISK IND: HCPCS | Performed by: INTERNAL MEDICINE

## 2017-08-10 PROCEDURE — 43235 EGD DIAGNOSTIC BRUSH WASH: CPT | Performed by: INTERNAL MEDICINE

## 2017-08-10 PROCEDURE — 25010000002 PROPOFOL 10 MG/ML EMULSION: Performed by: NURSE ANESTHETIST, CERTIFIED REGISTERED

## 2017-08-10 RX ORDER — PROPOFOL 10 MG/ML
VIAL (ML) INTRAVENOUS AS NEEDED
Status: DISCONTINUED | OUTPATIENT
Start: 2017-08-10 | End: 2017-08-10 | Stop reason: SURG

## 2017-08-10 RX ORDER — LIDOCAINE HYDROCHLORIDE 20 MG/ML
INJECTION, SOLUTION INFILTRATION; PERINEURAL AS NEEDED
Status: DISCONTINUED | OUTPATIENT
Start: 2017-08-10 | End: 2017-08-10 | Stop reason: SURG

## 2017-08-10 RX ORDER — SODIUM CHLORIDE 9 MG/ML
500 INJECTION, SOLUTION INTRAVENOUS CONTINUOUS PRN
Status: DISCONTINUED | OUTPATIENT
Start: 2017-08-10 | End: 2017-08-10 | Stop reason: HOSPADM

## 2017-08-10 RX ORDER — SODIUM CHLORIDE 0.9 % (FLUSH) 0.9 %
3 SYRINGE (ML) INJECTION AS NEEDED
Status: DISCONTINUED | OUTPATIENT
Start: 2017-08-10 | End: 2017-08-10 | Stop reason: HOSPADM

## 2017-08-10 RX ORDER — LIDOCAINE HYDROCHLORIDE 10 MG/ML
0.5 INJECTION, SOLUTION INFILTRATION; PERINEURAL ONCE AS NEEDED
Status: DISCONTINUED | OUTPATIENT
Start: 2017-08-10 | End: 2017-08-10 | Stop reason: SDUPTHER

## 2017-08-10 RX ADMIN — PROPOFOL 30 MG: 10 INJECTION, EMULSION INTRAVENOUS at 09:48

## 2017-08-10 RX ADMIN — PROPOFOL 50 MG: 10 INJECTION, EMULSION INTRAVENOUS at 09:27

## 2017-08-10 RX ADMIN — PROPOFOL 30 MG: 10 INJECTION, EMULSION INTRAVENOUS at 09:45

## 2017-08-10 RX ADMIN — PROPOFOL 20 MG: 10 INJECTION, EMULSION INTRAVENOUS at 09:35

## 2017-08-10 RX ADMIN — PROPOFOL 20 MG: 10 INJECTION, EMULSION INTRAVENOUS at 09:38

## 2017-08-10 RX ADMIN — PROPOFOL 20 MG: 10 INJECTION, EMULSION INTRAVENOUS at 09:37

## 2017-08-10 RX ADMIN — SODIUM CHLORIDE 500 ML: 9 INJECTION, SOLUTION INTRAVENOUS at 08:46

## 2017-08-10 RX ADMIN — PROPOFOL 20 MG: 10 INJECTION, EMULSION INTRAVENOUS at 09:36

## 2017-08-10 RX ADMIN — PROPOFOL 30 MG: 10 INJECTION, EMULSION INTRAVENOUS at 09:34

## 2017-08-10 RX ADMIN — PROPOFOL 20 MG: 10 INJECTION, EMULSION INTRAVENOUS at 09:32

## 2017-08-10 RX ADMIN — PROPOFOL 20 MG: 10 INJECTION, EMULSION INTRAVENOUS at 09:30

## 2017-08-10 RX ADMIN — PROPOFOL 30 MG: 10 INJECTION, EMULSION INTRAVENOUS at 09:41

## 2017-08-10 RX ADMIN — LIDOCAINE HYDROCHLORIDE 60 MG: 20 INJECTION, SOLUTION INFILTRATION; PERINEURAL at 09:26

## 2017-08-10 NOTE — PLAN OF CARE
Problem: Patient Care Overview (Adult)  Goal: Plan of Care Review  Outcome: Outcome(s) achieved Date Met:  08/10/17    08/10/17 1029   Coping/Psychosocial Response Interventions   Plan Of Care Reviewed With patient;family   Patient Care Overview   Progress progress toward functional goals as expected   Outcome Evaluation   Outcome Summary/Follow up Plan d/c criteria met

## 2017-08-10 NOTE — ANESTHESIA POSTPROCEDURE EVALUATION
Patient: Karen Snell    Procedure Summary     Date Anesthesia Start Anesthesia Stop Room / Location    08/10/17 0912 0950 [facility] ENDOSCOPY 6 / [facility]       Procedure Diagnosis Surgeon Provider    ESOPHAGOGASTRODUODENOSCOPY WITH ANESTHESIA (N/A Esophagus); COLONOSCOPY WITH ANESTHESIA (N/A ) Colon polyp; Epigastric abdominal pain  (Colon polyp [K63.5]; Epigastric abdominal pain [R10.13]) MD Dl Inman CRNA          Anesthesia Type: general  Last vitals  BP        Temp        Pulse       Resp        SpO2          Post Anesthesia Care and Evaluation    Patient location during evaluation: PHASE II  Patient participation: complete - patient participated  Level of consciousness: awake and alert  Pain score: 0  Pain management: adequate  Airway patency: patent  Anesthetic complications: No anesthetic complications  PONV Status: none  Cardiovascular status: acceptable and stable  Respiratory status: acceptable and unassisted  Hydration status: acceptable

## 2017-08-10 NOTE — H&P
"    Chief Complaint:   Colon polyps and midepigastric pain    Subjective     HPI:   The patient has history colon polyps found in September 2013.  When seen in the office to discuss colonoscopy she also had complaints of midepigastric pain.  She tells me that midepigastric pain is now resolved.  She is on Xarelto note is been on hold.    Past Medical History:   Past Medical History:   Diagnosis Date   • Arthritis    • Colon polyp    • DVT (deep venous thrombosis)    • GERD (gastroesophageal reflux disease)    • Hypertension    • PE (pulmonary thromboembolism)    • Sleep apnea        Past Surgical History:  [unfilled]    Family History:  Family History   Problem Relation Age of Onset   • Breast cancer Neg Hx        Social History:   reports that she has quit smoking. She has never used smokeless tobacco. She reports that she does not drink alcohol or use illicit drugs.    Medications:   Prescriptions Prior to Admission   Medication Sig Dispense Refill Last Dose   • buPROPion XL (WELLBUTRIN XL) 300 MG 24 hr tablet Take 300 mg by mouth Daily.   8/8/2017   • citalopram (CeleXA) 20 MG tablet Take 20 mg by mouth Daily.   8/8/2017   • donepezil (ARICEPT) 10 MG tablet Take 10 mg by mouth Every Night.   8/8/2017   • pravastatin (PRAVACHOL) 40 MG tablet Take 40 mg by mouth Daily.   Unknown at Unknown time   • rivaroxaban (XARELTO) 20 MG tablet Take 20 mg by mouth Daily.   8/7/2017       Allergies:  Review of patient's allergies indicates no known allergies.    ROS:    General: Weight stable  Resp: No SOA  Cardiovascular: No CP      Objective     /82 (Patient Position: Sitting)  Pulse 82  Temp 97.2 °F (36.2 °C) (Temporal Artery )   Resp 20  Ht 65\" (165.1 cm)  Wt 269 lb (122 kg)  SpO2 93%  BMI 44.76 kg/m2    Physical Exam   Constitutional: Pt is oriented to person, place, and in no distress.  Eyes: No icterus  ENMT: Unremarkable   Cardiovascular: Normal rate, regular rhythm.    Pulmonary/Chest: No distress.  No audible " wheezes  Abdominal: Soft.   Skin: Skin is warm and dry.   Psychiatric: Mood, memory, affect and judgment appear normal.     Assessment/Plan     Diagnosis:  Midepigastric pain  Colon polyps  Anticoagulated state secondary to Xarelto now on hold.    Anticipated Surgical Procedure:  Endoscopy and colonoscopy    The risks, benefits, and alternatives of endoscopy were reviewed with the patient today.  Risks including perforation, with or without dilation, possibly requiring surgery.  Additional risks include risk of bleeding.  There is also the risk of a drug reaction or problems with anesthesia.  This will be discussed with the further by the anesthesia team on the day of the procedure. The benefits include the diagnosis and management of disease of the upper digestive tract.  Alternatives to endoscopy include upper GI series, laboratory testing, radiographic evaluation, or no intervention.  The patient verbalizes understanding and agrees.    The risks, benefits, and alternatives of colonoscopy were reviewed with the patient today.  Risks including perforation of the colon possibly requiring surgery or colostomy.  Additional risks include risk of bleeding from biopsies or removal of colon tissue.  There is also the risk of a drug reaction or problems with anesthesia.  This will be discussed with the further by the anesthesia team on the day of the procedure.  Lastly there is a possibility of missing a colon polyp or cancer.  The benefits include the diagnosis and management of disease of the colon and rectum.  Alternatives to colonoscopy include barium enema, laboratory testing, radiographic evaluation, or no intervention.  The patient verbalizes understanding and agrees.    Much of this encounter note is an electronic transcription/translation of spoken language to printed text. The electronic translation of spoken language may permit erroneous, or at times, nonsensical words or phrases to be inadvertently transcribed;  although I have reviewed the note for such errors, some may still exist.

## 2017-08-10 NOTE — PLAN OF CARE
Problem: Patient Care Overview (Adult)  Goal: Plan of Care Review  Outcome: Ongoing (interventions implemented as appropriate)    08/10/17 0950   Coping/Psychosocial Response Interventions   Plan Of Care Reviewed With patient   Patient Care Overview   Progress no change   Outcome Evaluation   Outcome Summary/Follow up Plan pt tolerated procedure well

## 2017-08-10 NOTE — PLAN OF CARE
Problem: GI Endoscopy (Adult)  Goal: Signs and Symptoms of Listed Potential Problems Will be Absent or Manageable (GI Endoscopy)  Outcome: Outcome(s) achieved Date Met:  08/10/17

## 2017-08-10 NOTE — ANESTHESIA PREPROCEDURE EVALUATION
Anesthesia Evaluation     Patient summary reviewed   no history of anesthetic complications:         Airway   Mallampati: I  TM distance: <3 FB  Dental          Pulmonary    (+) pulmonary embolism, sleep apnea (noncompliant with CPAP),   Cardiovascular     ECG reviewed    (+) hypertension, DVT, hyperlipidemia    ROS comment: xarelto 8/7    Echo:  ·Left ventricular systolic function is normal. Estimated ejection fraction is 56-60%.  ·Diastolic function is normal for age.  ·Right ventricular size and systolic function is normal.  ·All valves appear normal in structure and function with no hemodynamically significant disease.  ·There is no evidence of right to left shunt on bubble study.        Neuro/Psych  (+) dementia (on aricept),    GI/Hepatic/Renal/Endo    (+) morbid obesity, GERD, diabetes mellitus (no meds currently following weight loss),   (-) liver disease, no renal disease    Musculoskeletal     Abdominal    Substance History      OB/GYN          Other                                      Anesthesia Plan    ASA 3     general   total IV anesthesia  intravenous induction   Anesthetic plan and risks discussed with patient.

## 2017-10-30 ENCOUNTER — TELEPHONE (OUTPATIENT)
Dept: GASTROENTEROLOGY | Facility: CLINIC | Age: 72
End: 2017-10-30

## 2017-10-30 DIAGNOSIS — R10.10 PAIN OF UPPER ABDOMEN: Primary | ICD-10-CM

## 2017-10-30 NOTE — TELEPHONE ENCOUNTER
Patients son called stating that his mother was still having stomach troubles. She says she feels a knot in her stomach from time to time. She told her PCP and they said since she has had colonoscopy recently we need to address this?

## 2017-10-31 NOTE — TELEPHONE ENCOUNTER
Chart reviewed.  She had a normal endoscopy and colonoscopy within the last few months.  She also had normal CMP.  I do not see result of the CT scan.  Place an order for this to be done.  Set up an office visit with Ping to regroup.    Jenna Page MD

## 2017-11-28 ENCOUNTER — HOSPITAL ENCOUNTER (OUTPATIENT)
Dept: CT IMAGING | Facility: HOSPITAL | Age: 72
Discharge: HOME | End: 2017-11-28
Attending: INTERNAL MEDICINE | Admitting: INTERNAL MEDICINE

## 2017-11-28 DIAGNOSIS — R10.10 PAIN OF UPPER ABDOMEN: ICD-10-CM

## 2017-11-28 LAB — CREAT BLDA-MCNC: 1.1 MG/DL (ref 0.6–1.3)

## 2017-11-28 PROCEDURE — 0 IOPAMIDOL 61 % SOLUTION: Performed by: INTERNAL MEDICINE

## 2017-11-28 PROCEDURE — 82565 ASSAY OF CREATININE: CPT

## 2017-11-28 PROCEDURE — 0 IOHEXOL 300 MG/ML SOLUTION: Performed by: INTERNAL MEDICINE

## 2017-11-28 PROCEDURE — 74177 CT ABD & PELVIS W/CONTRAST: CPT

## 2017-11-28 RX ADMIN — IOPAMIDOL 100 ML: 612 INJECTION, SOLUTION INTRAVENOUS at 10:15

## 2017-11-28 RX ADMIN — IOHEXOL 50 ML: 300 INJECTION, SOLUTION INTRAVENOUS at 10:15

## 2017-12-01 ENCOUNTER — TELEPHONE (OUTPATIENT)
Dept: GASTROENTEROLOGY | Facility: CLINIC | Age: 72
End: 2017-12-01

## 2017-12-01 NOTE — TELEPHONE ENCOUNTER
"Impression                1. Colon containing supraumbilical hernia with no evidence for  dilatation.  2. Question of a nonobstructive bilateral punctate ureteral stones  versus phleboliths.  3. Indeterminate exophytic lesion in the right kidney. Consider renal  ultrasound for further characterization.  This report was finalized on 11/28/2017 12:52 by Dr. Breanna Laura MD.         Please inform patient that her CAT scan was reviewed.  Results are noted above.  I recommend that she follow-up with her primary care provider for 2 issues.  First, she has a hernia that contain some colon.  As might be the \"knot\" that her family described in a call our office earlier.    The other issue is that she has a possible lesion on her right kidney.  This needs to be further assessed.  Please send a copy of the CT scan to her primary care provider.      None of these are issues that I can assist with.    Jenna Page MD       "

## 2017-12-05 ENCOUNTER — OFFICE VISIT (OUTPATIENT)
Dept: GASTROENTEROLOGY | Facility: CLINIC | Age: 72
End: 2017-12-05

## 2017-12-05 VITALS
BODY MASS INDEX: 44.32 KG/M2 | DIASTOLIC BLOOD PRESSURE: 66 MMHG | OXYGEN SATURATION: 96 % | HEART RATE: 67 BPM | SYSTOLIC BLOOD PRESSURE: 124 MMHG | TEMPERATURE: 95.7 F | HEIGHT: 65 IN | WEIGHT: 266 LBS

## 2017-12-05 DIAGNOSIS — R93.89 ABNORMAL FINDING ON IMAGING: ICD-10-CM

## 2017-12-05 DIAGNOSIS — R10.13 EPIGASTRIC PAIN: Primary | ICD-10-CM

## 2017-12-05 DIAGNOSIS — K42.9 UMBILICAL HERNIA WITHOUT OBSTRUCTION AND WITHOUT GANGRENE: ICD-10-CM

## 2017-12-05 PROCEDURE — 99213 OFFICE O/P EST LOW 20 MIN: CPT | Performed by: NURSE PRACTITIONER

## 2017-12-05 NOTE — PROGRESS NOTES
Chief Complaint:   Chief Complaint   Patient presents with   • GI Problem     Patient is here today following up CT.         Patient ID: Karen Snell is a 72 y.o. female     History of Present Illness: This is a very pleasant 72-year-old female who is here on last office visit with complaints of epigastric pain and was in need for a colon recall.  The patient underwent both an EGD and colonoscopy performed on 8/10/17 that were found to be normal.  The patient did have complaints of generalized abdominal pain and Dr. Page ordered a CT of the abdomen and pelvis.  There are findings of colon containing supraumbilical hernia with no evidence for dilation and question of nonobstructive bilateral puncture irritable stones phleboliths.  With noting for consideration for ultrasound for further characterization.  I have informed the patient and her son of these findings today.  She will need to follow-up with her PCP to be further assessed for this.    The patient states that she is no longer having any epigastric pain.  She denies any nausea, vomiting, dysphagia or hematemesis.  She denies any fever or chills.  She denies any unintentional weight loss or loss of appetite.  She denies any melena or hematochezia.      Past Medical History:   Diagnosis Date   • Arthritis    • Colon polyp    • DVT (deep venous thrombosis)    • GERD (gastroesophageal reflux disease)    • Hypertension    • PE (pulmonary thromboembolism)    • Sleep apnea        Past Surgical History:   Procedure Laterality Date   • CHOLECYSTECTOMY     • COLONOSCOPY  09/24/2013   • COLONOSCOPY N/A 8/10/2017    Procedure: COLONOSCOPY WITH ANESTHESIA;  Surgeon: Jenna Page MD;  Location: [facility] ENDOSCOPY;  Service:    • ENDOSCOPY N/A 8/10/2017    Procedure: ESOPHAGOGASTRODUODENOSCOPY WITH ANESTHESIA;  Surgeon: Jenna Page MD;  Location: [facility] ENDOSCOPY;  Service:    • JOINT REPLACEMENT           Current Outpatient Prescriptions:   •  buPROPion XL (WELLBUTRIN  "XL) 300 MG 24 hr tablet, Take 300 mg by mouth Daily., Disp: , Rfl:   •  citalopram (CeleXA) 20 MG tablet, Take 20 mg by mouth Daily., Disp: , Rfl:   •  donepezil (ARICEPT) 10 MG tablet, Take 10 mg by mouth Every Night., Disp: , Rfl:   •  pravastatin (PRAVACHOL) 40 MG tablet, Take 40 mg by mouth Daily., Disp: , Rfl:   •  rivaroxaban (XARELTO) 20 MG tablet, Take 20 mg by mouth Daily., Disp: , Rfl:     No Known Allergies    Social History     Social History   • Marital status: Single     Spouse name: N/A   • Number of children: N/A   • Years of education: N/A     Occupational History   • Not on file.     Social History Main Topics   • Smoking status: Former Smoker   • Smokeless tobacco: Never Used   • Alcohol use No   • Drug use: No   • Sexual activity: Not on file     Other Topics Concern   • Not on file     Social History Narrative       Family History   Problem Relation Age of Onset   • Breast cancer Neg Hx    • Colon cancer Neg Hx    • Colon polyps Neg Hx        Vitals:    12/05/17 0944   BP: 124/66   Pulse: 67   Temp: 95.7 °F (35.4 °C)   SpO2: 96%   Weight: 121 kg (266 lb)   Height: 165.1 cm (65\")       Review of Systems:    General:    Present -feeling well   Skin:    Not Present-Rash   HEENT:     Not Present-Acute visual changes or Acute hearing changes   Neck :    Not Present- swollen glands   Genitourinary:      Not Present- burning, frequency, urgency hematuria, dysuria,   Cardiovascular:   Not Present-chest pain, palpitations, or pressure   Respiratory:   Not Present- shortness of breath or cough   Gastrointestinal:  Musculoskeletal:  Neurological:  Psychiatric:   Present as mentioned in the HP    Not Present. Recent gait disturbances.    Not Present-Seizures and weakness in extremities.    Not Present- Anxiety or Depression.       Physical Exam:    General Appearance:    Alert, cooperative, in no acute distress   Psych:    Mood appropriate    Eyes:          conjunctivae and sclerae normal, no   icterus, no " pallor   ENMT:    Ears appear intact with no abnormalities noted oral mucosa moist   Neck:   No adenopathy, supple, trachea midline, no thyromegaly, no   carotid bruit, no JVD    Cardiovascular:    Regular rhythm and normal rate, normal S1 and S2, no            murmur, no gallop, no rub, no click   Gastrointestinal:     Inspection normal.  Normal bowel sounds, no masses, no organomegaly, soft round non-tender, non-distended, no guarding, no rebound or tenderness. No hepatosplenomegaly.   Skin:   No bleeding, bruising or rash   Neurologic:   nonfocal       Lab Results   Component Value Date    WBC 5.49 05/31/2017    WBC 6.34 10/30/2016    WBC 7.43 08/20/2016    HGB 11.5 (L) 05/31/2017    HGB 11.3 (L) 10/30/2016    HGB 9.5 (L) 08/20/2016    HCT 34.2 (L) 05/31/2017    HCT 33.8 (L) 10/30/2016    HCT 28.9 (L) 08/20/2016     05/31/2017     10/30/2016     08/20/2016        Lab Results   Component Value Date     05/31/2017    .9 10/30/2016     10/30/2016    K 4.7 05/31/2017    K 4.2 10/30/2016    K 4.2 08/20/2016     05/31/2017     10/30/2016     08/20/2016    CO2 25.0 05/31/2017    CO2 29.0 10/30/2016    CO2 28 08/20/2016    BUN 11 05/31/2017    BUN 14 10/30/2016    BUN 16 08/20/2016    CREATININE 1.10 11/28/2017    CREATININE 0.97 05/31/2017    CREATININE 0.92 10/30/2016    BILITOT 0.9 05/31/2017    BILITOT 0.5 10/30/2016    BILITOT 0.7 08/08/2016    ALKPHOS 115 05/31/2017    ALKPHOS 103 10/30/2016    ALKPHOS 118 08/08/2016    ALT 19 05/31/2017    ALT 22 10/30/2016    ALT 21 08/08/2016    AST 37 05/31/2017    AST 21 10/30/2016    AST 23 08/08/2016    GLUCOSE 105 (H) 05/31/2017    GLUCOSE 123 (H) 10/30/2016    GLUCOSE 94 08/20/2016       Lab Results   Component Value Date    INR 0.97 05/31/2017    INR 1.51 (H) 10/30/2016    INR 1.03 08/08/2016       Assessment and Plan:    Karen was seen today for gi problem.    Diagnoses and all orders for this visit:    Epigastric  pain  Resolved  Umbilical hernia without obstruction and without gangrene    Abnormal finding on imaging  Will refer the patient back to her PCP for further assessment of the above-noted findings.        Next follow-up appointment      EMR Dragon/Transcription disclaimer:  Much of this encounter note is an electronic transcription/translation of spoken language to printed text. The electronic translation of spoken language may permit erroneous, or at times, nonsensical words or phrases to be inadvertently transcribed; although I have reviewed the note for such errors, some may still exist.

## 2017-12-07 ENCOUNTER — TRANSCRIBE ORDERS (OUTPATIENT)
Dept: ADMINISTRATIVE | Facility: HOSPITAL | Age: 72
End: 2017-12-07

## 2017-12-07 DIAGNOSIS — R10.10 UPPER ABDOMINAL PAIN: ICD-10-CM

## 2017-12-07 DIAGNOSIS — Q61.00 CONGENITAL RENAL CYST: Primary | ICD-10-CM

## 2017-12-13 ENCOUNTER — APPOINTMENT (OUTPATIENT)
Dept: ULTRASOUND IMAGING | Facility: HOSPITAL | Age: 72
End: 2017-12-13

## 2017-12-14 ENCOUNTER — HOSPITAL ENCOUNTER (OUTPATIENT)
Dept: ULTRASOUND IMAGING | Facility: HOSPITAL | Age: 72
Discharge: HOME | End: 2017-12-14
Admitting: NURSE PRACTITIONER

## 2017-12-14 DIAGNOSIS — R10.10 UPPER ABDOMINAL PAIN: ICD-10-CM

## 2017-12-14 DIAGNOSIS — Q61.00 CONGENITAL RENAL CYST: ICD-10-CM

## 2017-12-14 PROCEDURE — 76775 US EXAM ABDO BACK WALL LIM: CPT

## 2018-01-01 ENCOUNTER — HOSPITAL ENCOUNTER (OUTPATIENT)
Dept: ULTRASOUND IMAGING | Facility: HOSPITAL | Age: 73
Discharge: HOME | End: 2018-10-15
Admitting: INTERNAL MEDICINE

## 2018-01-01 ENCOUNTER — TRANSCRIBE ORDERS (OUTPATIENT)
Dept: ADMINISTRATIVE | Facility: HOSPITAL | Age: 73
End: 2018-01-01

## 2018-01-01 DIAGNOSIS — R11.2 NAUSEA AND VOMITING, INTRACTABILITY OF VOMITING NOT SPECIFIED, UNSPECIFIED VOMITING TYPE: ICD-10-CM

## 2018-01-01 DIAGNOSIS — R10.9 ABDOMINAL PAIN, UNSPECIFIED ABDOMINAL LOCATION: ICD-10-CM

## 2018-01-01 DIAGNOSIS — R10.9 ABDOMINAL PAIN, UNSPECIFIED ABDOMINAL LOCATION: Primary | ICD-10-CM

## 2018-01-01 PROCEDURE — 76700 US EXAM ABDOM COMPLETE: CPT

## 2019-01-01 ENCOUNTER — OFFICE VISIT (OUTPATIENT)
Dept: WOUND CARE | Facility: HOSPITAL | Age: 74
End: 2019-01-01

## 2019-01-01 ENCOUNTER — READMISSION MANAGEMENT (OUTPATIENT)
Dept: CALL CENTER | Facility: HOSPITAL | Age: 74
End: 2019-01-01

## 2019-01-01 ENCOUNTER — APPOINTMENT (OUTPATIENT)
Dept: ULTRASOUND IMAGING | Facility: HOSPITAL | Age: 74
End: 2019-01-01

## 2019-01-01 ENCOUNTER — APPOINTMENT (OUTPATIENT)
Dept: GENERAL RADIOLOGY | Facility: HOSPITAL | Age: 74
End: 2019-01-01

## 2019-01-01 ENCOUNTER — HOSPITAL ENCOUNTER (INPATIENT)
Facility: HOSPITAL | Age: 74
LOS: 4 days | Discharge: HOME HEALTH | End: 2019-06-26
Attending: EMERGENCY MEDICINE | Admitting: INTERNAL MEDICINE

## 2019-01-01 ENCOUNTER — HOSPITAL ENCOUNTER (OUTPATIENT)
Dept: CT IMAGING | Facility: HOSPITAL | Age: 74
Discharge: HOME | End: 2019-02-04
Admitting: NURSE PRACTITIONER

## 2019-01-01 ENCOUNTER — HOSPITAL ENCOUNTER (INPATIENT)
Facility: HOSPITAL | Age: 74
LOS: 1 days | Discharge: NURSING, REHAB OR LONG-TERM CARE | End: 2019-08-12
Attending: FAMILY MEDICINE | Admitting: FAMILY MEDICINE

## 2019-01-01 ENCOUNTER — LAB REQUISITION (OUTPATIENT)
Dept: LAB | Facility: HOSPITAL | Age: 74
End: 2019-01-01

## 2019-01-01 ENCOUNTER — HOSPITAL ENCOUNTER (INPATIENT)
Facility: HOSPITAL | Age: 74
LOS: 5 days | Discharge: HOME HEALTH | End: 2019-06-07
Attending: EMERGENCY MEDICINE | Admitting: FAMILY MEDICINE

## 2019-01-01 ENCOUNTER — TRANSCRIBE ORDERS (OUTPATIENT)
Dept: ADMINISTRATIVE | Facility: HOSPITAL | Age: 74
End: 2019-01-01

## 2019-01-01 ENCOUNTER — APPOINTMENT (OUTPATIENT)
Dept: CARDIOLOGY | Facility: HOSPITAL | Age: 74
End: 2019-01-01

## 2019-01-01 ENCOUNTER — APPOINTMENT (OUTPATIENT)
Dept: CT IMAGING | Facility: HOSPITAL | Age: 74
End: 2019-01-01

## 2019-01-01 ENCOUNTER — APPOINTMENT (OUTPATIENT)
Dept: WOUND CARE | Facility: HOSPITAL | Age: 74
End: 2019-01-01

## 2019-01-01 ENCOUNTER — HOSPITAL ENCOUNTER (EMERGENCY)
Facility: HOSPITAL | Age: 74
Discharge: HOME | End: 2019-02-01
Admitting: EMERGENCY MEDICINE

## 2019-01-01 ENCOUNTER — HOSPITAL ENCOUNTER (OUTPATIENT)
Dept: MAMMOGRAPHY | Facility: HOSPITAL | Age: 74
Discharge: HOME | End: 2019-02-04

## 2019-01-01 VITALS
DIASTOLIC BLOOD PRESSURE: 68 MMHG | OXYGEN SATURATION: 99 % | HEART RATE: 68 BPM | SYSTOLIC BLOOD PRESSURE: 158 MMHG | BODY MASS INDEX: 46.25 KG/M2 | HEIGHT: 62 IN | TEMPERATURE: 97.1 F | WEIGHT: 251.32 LBS | RESPIRATION RATE: 16 BRPM

## 2019-01-01 VITALS
HEART RATE: 96 BPM | TEMPERATURE: 97.9 F | SYSTOLIC BLOOD PRESSURE: 104 MMHG | RESPIRATION RATE: 18 BRPM | DIASTOLIC BLOOD PRESSURE: 78 MMHG | OXYGEN SATURATION: 100 % | BODY MASS INDEX: 38.68 KG/M2 | WEIGHT: 246.44 LBS | HEIGHT: 67 IN

## 2019-01-01 VITALS
RESPIRATION RATE: 20 BRPM | OXYGEN SATURATION: 94 % | WEIGHT: 242.6 LBS | BODY MASS INDEX: 40.42 KG/M2 | TEMPERATURE: 97.1 F | HEIGHT: 65 IN | DIASTOLIC BLOOD PRESSURE: 78 MMHG | HEART RATE: 100 BPM | SYSTOLIC BLOOD PRESSURE: 126 MMHG

## 2019-01-01 VITALS
SYSTOLIC BLOOD PRESSURE: 90 MMHG | HEART RATE: 91 BPM | WEIGHT: 225 LBS | HEIGHT: 65 IN | OXYGEN SATURATION: 94 % | TEMPERATURE: 97.8 F | BODY MASS INDEX: 37.49 KG/M2 | RESPIRATION RATE: 20 BRPM | DIASTOLIC BLOOD PRESSURE: 46 MMHG

## 2019-01-01 DIAGNOSIS — Z12.39 SCREENING BREAST EXAMINATION: ICD-10-CM

## 2019-01-01 DIAGNOSIS — Z00.00 ENCOUNTER FOR GENERAL ADULT MEDICAL EXAMINATION WITHOUT ABNORMAL FINDINGS: ICD-10-CM

## 2019-01-01 DIAGNOSIS — Z78.9 DECREASED ACTIVITIES OF DAILY LIVING (ADL): ICD-10-CM

## 2019-01-01 DIAGNOSIS — R74.01 ELEVATED TRANSAMINASE LEVEL: ICD-10-CM

## 2019-01-01 DIAGNOSIS — Z74.09 IMPAIRED FUNCTIONAL MOBILITY, BALANCE, GAIT, AND ENDURANCE: ICD-10-CM

## 2019-01-01 DIAGNOSIS — R07.89 ATYPICAL CHEST PAIN: Primary | ICD-10-CM

## 2019-01-01 DIAGNOSIS — R60.9 PERIPHERAL EDEMA: ICD-10-CM

## 2019-01-01 DIAGNOSIS — R13.19 OTHER DYSPHAGIA: ICD-10-CM

## 2019-01-01 DIAGNOSIS — D64.9 SYMPTOMATIC ANEMIA: Primary | ICD-10-CM

## 2019-01-01 DIAGNOSIS — Z74.09 IMPAIRED MOBILITY AND ADLS: ICD-10-CM

## 2019-01-01 DIAGNOSIS — Z79.01 LONG TERM CURRENT USE OF ANTICOAGULANT: ICD-10-CM

## 2019-01-01 DIAGNOSIS — Z78.9 IMPAIRED MOBILITY AND ADLS: ICD-10-CM

## 2019-01-01 DIAGNOSIS — R06.09 DYSPNEA ON EXERTION: Primary | ICD-10-CM

## 2019-01-01 DIAGNOSIS — J18.9 PNEUMONIA OF BOTH LUNGS DUE TO INFECTIOUS ORGANISM, UNSPECIFIED PART OF LUNG: ICD-10-CM

## 2019-01-01 DIAGNOSIS — D72.829 LEUKOCYTOSIS, UNSPECIFIED TYPE: ICD-10-CM

## 2019-01-01 DIAGNOSIS — R13.10 DYSPHAGIA, UNSPECIFIED TYPE: ICD-10-CM

## 2019-01-01 DIAGNOSIS — R53.1 GENERALIZED WEAKNESS: ICD-10-CM

## 2019-01-01 DIAGNOSIS — R10.9 ABDOMINAL PAIN, UNSPECIFIED ABDOMINAL LOCATION: ICD-10-CM

## 2019-01-01 DIAGNOSIS — E87.6 HYPOKALEMIA: ICD-10-CM

## 2019-01-01 DIAGNOSIS — I27.20 PULMONARY HYPERTENSION (HCC): ICD-10-CM

## 2019-01-01 DIAGNOSIS — R10.9 ABDOMINAL PAIN, UNSPECIFIED ABDOMINAL LOCATION: Primary | ICD-10-CM

## 2019-01-01 DIAGNOSIS — R26.2 INABILITY TO WALK: Primary | ICD-10-CM

## 2019-01-01 LAB
ABO + RH BLD: NORMAL
ABO GROUP BLD: NORMAL
ALBUMIN SERPL-MCNC: 1.5 G/DL (ref 3.5–5)
ALBUMIN SERPL-MCNC: 1.6 G/DL (ref 3.5–5)
ALBUMIN SERPL-MCNC: 1.7 G/DL (ref 3.5–5)
ALBUMIN SERPL-MCNC: 1.7 G/DL (ref 3.5–5)
ALBUMIN SERPL-MCNC: 1.8 G/DL (ref 3.5–5)
ALBUMIN SERPL-MCNC: 1.8 G/DL (ref 3.5–5)
ALBUMIN SERPL-MCNC: 1.9 G/DL (ref 3.5–5)
ALBUMIN SERPL-MCNC: 2 G/DL (ref 3.5–5)
ALBUMIN SERPL-MCNC: 2 G/DL (ref 3.5–5)
ALBUMIN SERPL-MCNC: 2.1 G/DL (ref 3.5–5)
ALBUMIN SERPL-MCNC: 2.1 G/DL (ref 3.5–5)
ALBUMIN SERPL-MCNC: 2.3 G/DL (ref 3.5–5)
ALBUMIN SERPL-MCNC: 2.4 G/DL (ref 3.5–5)
ALBUMIN SERPL-MCNC: 2.4 G/DL (ref 3.5–5)
ALBUMIN SERPL-MCNC: 2.5 G/DL (ref 3.5–5)
ALBUMIN SERPL-MCNC: 2.5 G/DL (ref 3.5–5)
ALBUMIN SERPL-MCNC: 2.9 G/DL (ref 3.5–5)
ALBUMIN SERPL-MCNC: 3.5 G/DL (ref 3.5–5)
ALBUMIN/GLOB SERPL: 0.5 G/DL (ref 1.1–2.5)
ALBUMIN/GLOB SERPL: 0.6 G/DL (ref 1.1–2.5)
ALBUMIN/GLOB SERPL: 0.7 G/DL (ref 1.1–2.5)
ALBUMIN/GLOB SERPL: 0.8 G/DL (ref 1.1–2.5)
ALBUMIN/GLOB SERPL: 0.9 G/DL (ref 1.1–2.5)
ALBUMIN/GLOB SERPL: 0.9 G/DL (ref 1.1–2.5)
ALBUMIN/GLOB SERPL: 1 G/DL (ref 1.1–2.5)
ALBUMIN/GLOB SERPL: 1.4 G/DL (ref 1.1–2.5)
ALP SERPL-CCNC: 104 U/L (ref 24–120)
ALP SERPL-CCNC: 130 U/L (ref 24–120)
ALP SERPL-CCNC: 140 U/L (ref 24–120)
ALP SERPL-CCNC: 142 U/L (ref 24–120)
ALP SERPL-CCNC: 145 U/L (ref 24–120)
ALP SERPL-CCNC: 79 U/L (ref 24–120)
ALP SERPL-CCNC: 81 U/L (ref 24–120)
ALP SERPL-CCNC: 85 U/L (ref 24–120)
ALP SERPL-CCNC: 89 U/L (ref 24–120)
ALP SERPL-CCNC: 89 U/L (ref 24–120)
ALP SERPL-CCNC: 90 U/L (ref 24–120)
ALP SERPL-CCNC: 92 U/L (ref 24–120)
ALP SERPL-CCNC: 93 U/L (ref 24–120)
ALP SERPL-CCNC: 93 U/L (ref 24–120)
ALP SERPL-CCNC: 94 U/L (ref 24–120)
ALP SERPL-CCNC: 96 U/L (ref 24–120)
ALP SERPL-CCNC: 97 U/L (ref 24–120)
ALP SERPL-CCNC: 97 U/L (ref 24–120)
ALT SERPL W P-5'-P-CCNC: 176 U/L (ref 0–54)
ALT SERPL W P-5'-P-CCNC: 192 U/L (ref 0–54)
ALT SERPL W P-5'-P-CCNC: 198 U/L (ref 0–54)
ALT SERPL W P-5'-P-CCNC: 203 U/L (ref 0–54)
ALT SERPL W P-5'-P-CCNC: 206 U/L (ref 0–54)
ALT SERPL W P-5'-P-CCNC: 210 U/L (ref 0–54)
ALT SERPL W P-5'-P-CCNC: 214 U/L (ref 0–54)
ALT SERPL W P-5'-P-CCNC: 222 U/L (ref 0–54)
ALT SERPL W P-5'-P-CCNC: 229 U/L (ref 0–54)
ALT SERPL W P-5'-P-CCNC: 243 U/L (ref 0–54)
ALT SERPL W P-5'-P-CCNC: 247 U/L (ref 0–54)
ALT SERPL W P-5'-P-CCNC: 253 U/L (ref 0–54)
ALT SERPL W P-5'-P-CCNC: 270 U/L (ref 0–54)
ALT SERPL W P-5'-P-CCNC: 53 U/L (ref 0–54)
ALT SERPL W P-5'-P-CCNC: 54 U/L (ref 0–54)
ALT SERPL W P-5'-P-CCNC: 61 U/L (ref 0–54)
ALT SERPL W P-5'-P-CCNC: 64 U/L (ref 0–54)
ALT SERPL W P-5'-P-CCNC: <15 U/L (ref 0–54)
AMORPH URATE CRY URNS QL MICRO: ABNORMAL /HPF
ANION GAP SERPL CALCULATED.3IONS-SCNC: 0 MMOL/L (ref 4–13)
ANION GAP SERPL CALCULATED.3IONS-SCNC: 1 MMOL/L (ref 4–13)
ANION GAP SERPL CALCULATED.3IONS-SCNC: 10 MMOL/L (ref 4–13)
ANION GAP SERPL CALCULATED.3IONS-SCNC: 2 MMOL/L (ref 4–13)
ANION GAP SERPL CALCULATED.3IONS-SCNC: 2 MMOL/L (ref 4–13)
ANION GAP SERPL CALCULATED.3IONS-SCNC: 3 MMOL/L (ref 4–13)
ANION GAP SERPL CALCULATED.3IONS-SCNC: 4 MMOL/L (ref 4–13)
ANION GAP SERPL CALCULATED.3IONS-SCNC: 5 MMOL/L (ref 4–13)
ANION GAP SERPL CALCULATED.3IONS-SCNC: 6 MMOL/L (ref 4–13)
ANISOCYTOSIS BLD QL: ABNORMAL
ANISOCYTOSIS BLD QL: ABNORMAL
APTT PPP: 25.6 SECONDS (ref 24.1–34.8)
ARTICHOKE IGE QN: 103 MG/DL (ref 0–99)
ARTICHOKE IGE QN: 108 MG/DL (ref 0–99)
ARTICHOKE IGE QN: 95 MG/DL (ref 0–99)
ARTICHOKE IGE QN: 98 MG/DL (ref 0–99)
AST SERPL-CCNC: 155 U/L (ref 7–45)
AST SERPL-CCNC: 171 U/L (ref 7–45)
AST SERPL-CCNC: 177 U/L (ref 7–45)
AST SERPL-CCNC: 198 U/L (ref 7–45)
AST SERPL-CCNC: 222 U/L (ref 7–45)
AST SERPL-CCNC: 276 U/L (ref 7–45)
AST SERPL-CCNC: 280 U/L (ref 7–45)
AST SERPL-CCNC: 283 U/L (ref 7–45)
AST SERPL-CCNC: 287 U/L (ref 7–45)
AST SERPL-CCNC: 32 U/L (ref 7–45)
AST SERPL-CCNC: 320 U/L (ref 7–45)
AST SERPL-CCNC: 323 U/L (ref 7–45)
AST SERPL-CCNC: 325 U/L (ref 7–45)
AST SERPL-CCNC: 350 U/L (ref 7–45)
AST SERPL-CCNC: 73 U/L (ref 7–45)
AST SERPL-CCNC: 74 U/L (ref 7–45)
AST SERPL-CCNC: 76 U/L (ref 7–45)
AST SERPL-CCNC: 77 U/L (ref 7–45)
BACTERIA SPEC AEROBE CULT: ABNORMAL
BACTERIA SPEC AEROBE CULT: NORMAL
BACTERIA SPEC AEROBE CULT: NORMAL
BACTERIA UR QL AUTO: ABNORMAL /HPF
BASOPHILS # BLD AUTO: 0.02 10*3/MM3 (ref 0–0.2)
BASOPHILS # BLD AUTO: 0.03 10*3/MM3 (ref 0–0.2)
BASOPHILS # BLD AUTO: 0.04 10*3/MM3 (ref 0–0.2)
BASOPHILS # BLD AUTO: 0.05 10*3/MM3 (ref 0–0.2)
BASOPHILS # BLD AUTO: 0.06 10*3/MM3 (ref 0–0.2)
BASOPHILS # BLD AUTO: 0.06 10*3/MM3 (ref 0–0.2)
BASOPHILS # BLD AUTO: 0.07 10*3/MM3 (ref 0–0.2)
BASOPHILS # BLD MANUAL: 0.18 10*3/MM3 (ref 0–0.2)
BASOPHILS NFR BLD AUTO: 0.3 % (ref 0–1.5)
BASOPHILS NFR BLD AUTO: 0.3 % (ref 0–1.5)
BASOPHILS NFR BLD AUTO: 0.3 % (ref 0–2)
BASOPHILS NFR BLD AUTO: 0.4 % (ref 0–1.5)
BASOPHILS NFR BLD AUTO: 0.4 % (ref 0–1.5)
BASOPHILS NFR BLD AUTO: 0.4 % (ref 0–2)
BASOPHILS NFR BLD AUTO: 0.5 % (ref 0–2)
BASOPHILS NFR BLD AUTO: 1 % (ref 0–2)
BH BB BLOOD EXPIRATION DATE: NORMAL
BH BB BLOOD TYPE BARCODE: 7300
BH BB DISPENSE STATUS: NORMAL
BH BB PRODUCT CODE: NORMAL
BH BB UNIT NUMBER: NORMAL
BH CV ECHO MEAS - AO MAX PG (FULL): 6.2 MMHG
BH CV ECHO MEAS - AO MAX PG: 10.4 MMHG
BH CV ECHO MEAS - AO MEAN PG (FULL): 3 MMHG
BH CV ECHO MEAS - AO MEAN PG: 5 MMHG
BH CV ECHO MEAS - AO ROOT AREA (BSA CORRECTED): 1.5
BH CV ECHO MEAS - AO ROOT AREA: 8 CM^2
BH CV ECHO MEAS - AO ROOT DIAM: 3.2 CM
BH CV ECHO MEAS - AO V2 MAX: 161 CM/SEC
BH CV ECHO MEAS - AO V2 MEAN: 104 CM/SEC
BH CV ECHO MEAS - AO V2 VTI: 30 CM
BH CV ECHO MEAS - AVA(I,A): 2.2 CM^2
BH CV ECHO MEAS - AVA(I,D): 2.2 CM^2
BH CV ECHO MEAS - AVA(V,A): 2 CM^2
BH CV ECHO MEAS - AVA(V,D): 2 CM^2
BH CV ECHO MEAS - BSA(HAYCOCK): 2.4 M^2
BH CV ECHO MEAS - BSA: 2.2 M^2
BH CV ECHO MEAS - BZI_BMI: 42.4 KILOGRAMS/M^2
BH CV ECHO MEAS - BZI_METRIC_HEIGHT: 165.1 CM
BH CV ECHO MEAS - BZI_METRIC_WEIGHT: 115.7 KG
BH CV ECHO MEAS - EDV(CUBED): 167.3 ML
BH CV ECHO MEAS - EDV(MOD-SP4): 84.2 ML
BH CV ECHO MEAS - EDV(TEICH): 148 ML
BH CV ECHO MEAS - EF(CUBED): 78.7 %
BH CV ECHO MEAS - EF(MOD-SP4): 58.9 %
BH CV ECHO MEAS - EF(TEICH): 70.4 %
BH CV ECHO MEAS - ESV(CUBED): 35.6 ML
BH CV ECHO MEAS - ESV(MOD-SP4): 34.6 ML
BH CV ECHO MEAS - ESV(TEICH): 43.8 ML
BH CV ECHO MEAS - FS: 40.3 %
BH CV ECHO MEAS - IVS/LVPW: 0.94
BH CV ECHO MEAS - IVSD: 0.84 CM
BH CV ECHO MEAS - LA DIMENSION: 3.8 CM
BH CV ECHO MEAS - LA/AO: 1.2
BH CV ECHO MEAS - LAT PEAK E' VEL: 8.4 CM/SEC
BH CV ECHO MEAS - LV DIASTOLIC VOL/BSA (35-75): 38.4 ML/M^2
BH CV ECHO MEAS - LV MASS(C)D: 178.5 GRAMS
BH CV ECHO MEAS - LV MASS(C)DI: 81.4 GRAMS/M^2
BH CV ECHO MEAS - LV MAX PG: 4.2 MMHG
BH CV ECHO MEAS - LV MEAN PG: 2 MMHG
BH CV ECHO MEAS - LV SYSTOLIC VOL/BSA (12-30): 15.8 ML/M^2
BH CV ECHO MEAS - LV V1 MAX: 102 CM/SEC
BH CV ECHO MEAS - LV V1 MEAN: 73.4 CM/SEC
BH CV ECHO MEAS - LV V1 VTI: 21.4 CM
BH CV ECHO MEAS - LVIDD: 5.5 CM
BH CV ECHO MEAS - LVIDS: 3.3 CM
BH CV ECHO MEAS - LVLD AP4: 6.6 CM
BH CV ECHO MEAS - LVLS AP4: 5.9 CM
BH CV ECHO MEAS - LVOT AREA (M): 3.1 CM^2
BH CV ECHO MEAS - LVOT AREA: 3.1 CM^2
BH CV ECHO MEAS - LVOT DIAM: 2 CM
BH CV ECHO MEAS - LVPWD: 0.9 CM
BH CV ECHO MEAS - MED PEAK E' VEL: 7 CM/SEC
BH CV ECHO MEAS - MV A MAX VEL: 63.5 CM/SEC
BH CV ECHO MEAS - MV DEC SLOPE: 370 CM/SEC^2
BH CV ECHO MEAS - MV DEC TIME: 0.19 SEC
BH CV ECHO MEAS - MV E MAX VEL: 76.2 CM/SEC
BH CV ECHO MEAS - MV E/A: 1.2
BH CV ECHO MEAS - MV P1/2T MAX VEL: 80.1 CM/SEC
BH CV ECHO MEAS - MV P1/2T: 63.4 MSEC
BH CV ECHO MEAS - MVA P1/2T LCG: 2.7 CM^2
BH CV ECHO MEAS - MVA(P1/2T): 3.5 CM^2
BH CV ECHO MEAS - RAP SYSTOLE: 5 MMHG
BH CV ECHO MEAS - RVSP: 53.2 MMHG
BH CV ECHO MEAS - SI(AO): 110 ML/M^2
BH CV ECHO MEAS - SI(CUBED): 60 ML/M^2
BH CV ECHO MEAS - SI(LVOT): 30.7 ML/M^2
BH CV ECHO MEAS - SI(MOD-SP4): 22.6 ML/M^2
BH CV ECHO MEAS - SI(TEICH): 47.5 ML/M^2
BH CV ECHO MEAS - SV(AO): 241.3 ML
BH CV ECHO MEAS - SV(CUBED): 131.7 ML
BH CV ECHO MEAS - SV(LVOT): 67.2 ML
BH CV ECHO MEAS - SV(MOD-SP4): 49.6 ML
BH CV ECHO MEAS - SV(TEICH): 104.2 ML
BH CV ECHO MEAS - TR MAX VEL: 347 CM/SEC
BH CV ECHO MEASUREMENTS AVERAGE E/E' RATIO: 9.9
BH CV STRESS BP STAGE 1: NORMAL
BH CV STRESS BP STAGE 2: NORMAL
BH CV STRESS BP STAGE 3: NORMAL
BH CV STRESS DOSE DOBUTAMINE STAGE 1: 10
BH CV STRESS DOSE DOBUTAMINE STAGE 2: 20
BH CV STRESS DOSE DOBUTAMINE STAGE 3: 30
BH CV STRESS DURATION MIN STAGE 1: 3
BH CV STRESS DURATION MIN STAGE 2: 3
BH CV STRESS DURATION MIN STAGE 3: 1
BH CV STRESS DURATION SEC STAGE 1: 0
BH CV STRESS DURATION SEC STAGE 2: 0
BH CV STRESS DURATION SEC STAGE 3: 37
BH CV STRESS HR STAGE 1: 67
BH CV STRESS HR STAGE 2: 110
BH CV STRESS HR STAGE 3: 164
BH CV STRESS PROTOCOL 1: NORMAL
BH CV STRESS RECOVERY BP: NORMAL MMHG
BH CV STRESS RECOVERY HR: 83 BPM
BH CV STRESS STAGE 1: 1
BH CV STRESS STAGE 2: 2
BH CV STRESS STAGE 3: 3
BILIRUB CONJ SERPL-MCNC: 0 MG/DL (ref 0–0.3)
BILIRUB CONJ SERPL-MCNC: 0 MG/DL (ref 0–0.3)
BILIRUB INDIRECT SERPL-MCNC: 0.1 MG/DL (ref 0–1.1)
BILIRUB INDIRECT SERPL-MCNC: 0.1 MG/DL (ref 0–1.1)
BILIRUB SERPL-MCNC: 0.4 MG/DL (ref 0.1–1)
BILIRUB SERPL-MCNC: 0.5 MG/DL (ref 0.1–1)
BILIRUB SERPL-MCNC: 0.6 MG/DL (ref 0.1–1)
BILIRUB SERPL-MCNC: 0.7 MG/DL (ref 0.1–1)
BILIRUB SERPL-MCNC: 0.7 MG/DL (ref 0.1–1)
BILIRUB UR QL STRIP: NEGATIVE
BLD GP AB SCN SERPL QL: NEGATIVE
BUN BLD-MCNC: 19 MG/DL (ref 5–21)
BUN BLD-MCNC: 24 MG/DL (ref 5–21)
BUN BLD-MCNC: 24 MG/DL (ref 5–21)
BUN BLD-MCNC: 25 MG/DL (ref 5–21)
BUN BLD-MCNC: 26 MG/DL (ref 5–21)
BUN BLD-MCNC: 27 MG/DL (ref 5–21)
BUN BLD-MCNC: 28 MG/DL (ref 5–21)
BUN BLD-MCNC: 32 MG/DL (ref 5–21)
BUN BLD-MCNC: 33 MG/DL (ref 5–21)
BUN BLD-MCNC: 37 MG/DL (ref 5–21)
BUN BLD-MCNC: 41 MG/DL (ref 5–21)
BUN BLD-MCNC: 45 MG/DL (ref 5–21)
BUN BLD-MCNC: 46 MG/DL (ref 5–21)
BUN BLD-MCNC: 47 MG/DL (ref 5–21)
BUN BLD-MCNC: 53 MG/DL (ref 5–21)
BUN BLD-MCNC: 56 MG/DL (ref 5–21)
BUN BLD-MCNC: 56 MG/DL (ref 5–21)
BUN BLD-MCNC: 60 MG/DL (ref 5–21)
BUN BLD-MCNC: 65 MG/DL (ref 5–21)
BUN/CREAT SERPL: 19.8 (ref 7–25)
BUN/CREAT SERPL: 24.5 (ref 7–25)
BUN/CREAT SERPL: 25.5 (ref 7–25)
BUN/CREAT SERPL: 25.7 (ref 7–25)
BUN/CREAT SERPL: 26.2 (ref 7–25)
BUN/CREAT SERPL: 26.3 (ref 7–25)
BUN/CREAT SERPL: 26.5 (ref 7–25)
BUN/CREAT SERPL: 26.5 (ref 7–25)
BUN/CREAT SERPL: 27.3 (ref 7–25)
BUN/CREAT SERPL: 27.4 (ref 7–25)
BUN/CREAT SERPL: 27.4 (ref 7–25)
BUN/CREAT SERPL: 27.8 (ref 7–25)
BUN/CREAT SERPL: 27.8 (ref 7–25)
BUN/CREAT SERPL: 28.1 (ref 7–25)
BUN/CREAT SERPL: 28.6 (ref 7–25)
BUN/CREAT SERPL: 29.9 (ref 7–25)
BUN/CREAT SERPL: 30.1 (ref 7–25)
BUN/CREAT SERPL: 30.5 (ref 7–25)
BUN/CREAT SERPL: 30.6 (ref 7–25)
BUN/CREAT SERPL: 30.8 (ref 7–25)
BUN/CREAT SERPL: 31.3 (ref 7–25)
BUN/CREAT SERPL: 31.5 (ref 7–25)
BUN/CREAT SERPL: 35 (ref 7–25)
BUN/CREAT SERPL: 38 (ref 7–25)
CALCIUM SPEC-SCNC: 6.8 MG/DL (ref 8.4–10.4)
CALCIUM SPEC-SCNC: 6.9 MG/DL (ref 8.4–10.4)
CALCIUM SPEC-SCNC: 7 MG/DL (ref 8.4–10.4)
CALCIUM SPEC-SCNC: 7.1 MG/DL (ref 8.4–10.4)
CALCIUM SPEC-SCNC: 7.1 MG/DL (ref 8.4–10.4)
CALCIUM SPEC-SCNC: 7.2 MG/DL (ref 8.4–10.4)
CALCIUM SPEC-SCNC: 7.3 MG/DL (ref 8.4–10.4)
CALCIUM SPEC-SCNC: 7.4 MG/DL (ref 8.4–10.4)
CALCIUM SPEC-SCNC: 7.5 MG/DL (ref 8.4–10.4)
CALCIUM SPEC-SCNC: 7.6 MG/DL (ref 8.4–10.4)
CALCIUM SPEC-SCNC: 7.6 MG/DL (ref 8.4–10.4)
CALCIUM SPEC-SCNC: 7.7 MG/DL (ref 8.4–10.4)
CALCIUM SPEC-SCNC: 7.9 MG/DL (ref 8.4–10.4)
CALCIUM SPEC-SCNC: 8 MG/DL (ref 8.4–10.4)
CALCIUM SPEC-SCNC: 8 MG/DL (ref 8.4–10.4)
CALCIUM SPEC-SCNC: 8.2 MG/DL (ref 8.4–10.4)
CALCIUM SPEC-SCNC: 8.2 MG/DL (ref 8.4–10.4)
CALCIUM SPEC-SCNC: 8.3 MG/DL (ref 8.4–10.4)
CALCIUM SPEC-SCNC: 8.8 MG/DL (ref 8.4–10.4)
CHLORIDE SERPL-SCNC: 104 MMOL/L (ref 98–110)
CHLORIDE SERPL-SCNC: 104 MMOL/L (ref 98–110)
CHLORIDE SERPL-SCNC: 105 MMOL/L (ref 98–110)
CHLORIDE SERPL-SCNC: 106 MMOL/L (ref 98–110)
CHLORIDE SERPL-SCNC: 107 MMOL/L (ref 98–110)
CHLORIDE SERPL-SCNC: 108 MMOL/L (ref 98–110)
CHLORIDE SERPL-SCNC: 109 MMOL/L (ref 98–110)
CHLORIDE SERPL-SCNC: 110 MMOL/L (ref 98–110)
CHLORIDE SERPL-SCNC: 112 MMOL/L (ref 98–110)
CHLORIDE SERPL-SCNC: 113 MMOL/L (ref 98–110)
CHLORIDE SERPL-SCNC: 114 MMOL/L (ref 98–110)
CHLORIDE SERPL-SCNC: 115 MMOL/L (ref 98–110)
CHLORIDE SERPL-SCNC: 115 MMOL/L (ref 98–110)
CHLORIDE SERPL-SCNC: 117 MMOL/L (ref 98–110)
CHLORIDE SERPL-SCNC: 118 MMOL/L (ref 98–110)
CHOLEST SERPL-MCNC: 125 MG/DL (ref 130–200)
CHOLEST SERPL-MCNC: 134 MG/DL (ref 130–200)
CHOLEST SERPL-MCNC: 141 MG/DL (ref 130–200)
CHOLEST SERPL-MCNC: 170 MG/DL (ref 130–200)
CLARITY UR: ABNORMAL
CLARITY UR: ABNORMAL
CLARITY UR: CLEAR
CO2 SERPL-SCNC: 23 MMOL/L (ref 24–31)
CO2 SERPL-SCNC: 24 MMOL/L (ref 24–31)
CO2 SERPL-SCNC: 25 MMOL/L (ref 24–31)
CO2 SERPL-SCNC: 26 MMOL/L (ref 24–31)
CO2 SERPL-SCNC: 26 MMOL/L (ref 24–31)
CO2 SERPL-SCNC: 27 MMOL/L (ref 24–31)
CO2 SERPL-SCNC: 27 MMOL/L (ref 24–31)
CO2 SERPL-SCNC: 28 MMOL/L (ref 24–31)
CO2 SERPL-SCNC: 29 MMOL/L (ref 24–31)
CO2 SERPL-SCNC: 30 MMOL/L (ref 24–31)
CO2 SERPL-SCNC: 32 MMOL/L (ref 24–31)
CO2 SERPL-SCNC: 32 MMOL/L (ref 24–31)
CO2 SERPL-SCNC: 33 MMOL/L (ref 24–31)
CO2 SERPL-SCNC: 34 MMOL/L (ref 24–31)
COLOR UR: YELLOW
CREAT BLD-MCNC: 0.83 MG/DL (ref 0.5–1.4)
CREAT BLD-MCNC: 0.88 MG/DL (ref 0.5–1.4)
CREAT BLD-MCNC: 0.91 MG/DL (ref 0.5–1.4)
CREAT BLD-MCNC: 0.94 MG/DL (ref 0.5–1.4)
CREAT BLD-MCNC: 0.95 MG/DL (ref 0.5–1.4)
CREAT BLD-MCNC: 0.96 MG/DL (ref 0.5–1.4)
CREAT BLD-MCNC: 0.97 MG/DL (ref 0.5–1.4)
CREAT BLD-MCNC: 0.98 MG/DL (ref 0.5–1.4)
CREAT BLD-MCNC: 1.02 MG/DL (ref 0.5–1.4)
CREAT BLD-MCNC: 1.07 MG/DL (ref 0.5–1.4)
CREAT BLD-MCNC: 1.1 MG/DL (ref 0.5–1.4)
CREAT BLD-MCNC: 1.15 MG/DL (ref 0.5–1.4)
CREAT BLD-MCNC: 1.34 MG/DL (ref 0.5–1.4)
CREAT BLD-MCNC: 1.44 MG/DL (ref 0.5–1.4)
CREAT BLD-MCNC: 1.51 MG/DL (ref 0.5–1.4)
CREAT BLD-MCNC: 1.57 MG/DL (ref 0.5–1.4)
CREAT BLD-MCNC: 1.6 MG/DL (ref 0.5–1.4)
CREAT BLD-MCNC: 1.68 MG/DL (ref 0.5–1.4)
CREAT BLD-MCNC: 1.71 MG/DL (ref 0.5–1.4)
CREAT BLD-MCNC: 1.72 MG/DL (ref 0.5–1.4)
CREAT BLD-MCNC: 1.92 MG/DL (ref 0.5–1.4)
CREAT BLD-MCNC: 1.99 MG/DL (ref 0.5–1.4)
CREAT BLDA-MCNC: 1.3 MG/DL (ref 0.6–1.3)
CROSSMATCH INTERPRETATION: NORMAL
D DIMER PPP FEU-MCNC: 0.29 MG/L (FEU) (ref 0–0.5)
D DIMER PPP FEU-MCNC: 1.17 MG/L (FEU) (ref 0–0.5)
D-LACTATE SERPL-SCNC: 1.4 MMOL/L (ref 0.5–2)
DEPRECATED RDW RBC AUTO: 38.4 FL (ref 40–54)
DEPRECATED RDW RBC AUTO: 39 FL (ref 40–54)
DEPRECATED RDW RBC AUTO: 39.3 FL (ref 40–54)
DEPRECATED RDW RBC AUTO: 39.5 FL (ref 40–54)
DEPRECATED RDW RBC AUTO: 39.6 FL (ref 40–54)
DEPRECATED RDW RBC AUTO: 39.8 FL (ref 40–54)
DEPRECATED RDW RBC AUTO: 40.1 FL (ref 40–54)
DEPRECATED RDW RBC AUTO: 40.8 FL (ref 40–54)
DEPRECATED RDW RBC AUTO: 41.7 FL (ref 40–54)
DEPRECATED RDW RBC AUTO: 42 FL (ref 40–54)
DEPRECATED RDW RBC AUTO: 42.6 FL (ref 40–54)
DEPRECATED RDW RBC AUTO: 43.3 FL (ref 40–54)
DEPRECATED RDW RBC AUTO: 43.5 FL (ref 40–54)
DEPRECATED RDW RBC AUTO: 43.5 FL (ref 40–54)
DEPRECATED RDW RBC AUTO: 55.1 FL (ref 37–54)
DEPRECATED RDW RBC AUTO: 55.5 FL (ref 37–54)
DEPRECATED RDW RBC AUTO: 56.3 FL (ref 37–54)
DEPRECATED RDW RBC AUTO: 56.9 FL (ref 40–54)
DEPRECATED RDW RBC AUTO: 57.5 FL (ref 37–54)
DEVELOPER EXPIRATION DATE: NORMAL
DEVELOPER LOT NUMBER: 147
EOSINOPHIL # BLD AUTO: 0.24 10*3/MM3 (ref 0–0.7)
EOSINOPHIL # BLD AUTO: 0.29 10*3/MM3 (ref 0–0.7)
EOSINOPHIL # BLD AUTO: 0.33 10*3/MM3 (ref 0–0.7)
EOSINOPHIL # BLD AUTO: 0.39 10*3/MM3 (ref 0–0.7)
EOSINOPHIL # BLD AUTO: 0.42 10*3/MM3 (ref 0–0.7)
EOSINOPHIL # BLD AUTO: 0.56 10*3/MM3 (ref 0–0.7)
EOSINOPHIL # BLD AUTO: 1.16 10*3/MM3 (ref 0–0.7)
EOSINOPHIL # BLD AUTO: 1.29 10*3/MM3 (ref 0–0.4)
EOSINOPHIL # BLD AUTO: 1.37 10*3/MM3 (ref 0–0.4)
EOSINOPHIL # BLD AUTO: 1.63 10*3/MM3 (ref 0–0.4)
EOSINOPHIL # BLD AUTO: 1.85 10*3/MM3 (ref 0–0.4)
EOSINOPHIL # BLD MANUAL: 0.23 10*3/MM3 (ref 0–0.7)
EOSINOPHIL # BLD MANUAL: 0.23 10*3/MM3 (ref 0–0.7)
EOSINOPHIL # BLD MANUAL: 0.28 10*3/MM3 (ref 0–0.7)
EOSINOPHIL # BLD MANUAL: 0.37 10*3/MM3 (ref 0–0.7)
EOSINOPHIL # BLD MANUAL: 0.37 10*3/MM3 (ref 0–0.7)
EOSINOPHIL NFR BLD AUTO: 1.8 % (ref 0–4)
EOSINOPHIL NFR BLD AUTO: 11.4 % (ref 0.3–6.2)
EOSINOPHIL NFR BLD AUTO: 11.5 % (ref 0.3–6.2)
EOSINOPHIL NFR BLD AUTO: 13.7 % (ref 0.3–6.2)
EOSINOPHIL NFR BLD AUTO: 2 % (ref 0–4)
EOSINOPHIL NFR BLD AUTO: 2.9 % (ref 0–4)
EOSINOPHIL NFR BLD AUTO: 3 % (ref 0–4)
EOSINOPHIL NFR BLD AUTO: 3.3 % (ref 0–4)
EOSINOPHIL NFR BLD AUTO: 4.1 % (ref 0–4)
EOSINOPHIL NFR BLD AUTO: 8.6 % (ref 0–4)
EOSINOPHIL NFR BLD AUTO: 9.6 % (ref 0.3–6.2)
EOSINOPHIL NFR BLD MANUAL: 1 % (ref 0–4)
EOSINOPHIL NFR BLD MANUAL: 1 % (ref 0–4)
EOSINOPHIL NFR BLD MANUAL: 2 % (ref 0–4)
ERYTHROCYTE [DISTWIDTH] IN BLOOD BY AUTOMATED COUNT: 13.3 % (ref 12–15)
ERYTHROCYTE [DISTWIDTH] IN BLOOD BY AUTOMATED COUNT: 13.7 % (ref 12–15)
ERYTHROCYTE [DISTWIDTH] IN BLOOD BY AUTOMATED COUNT: 13.9 % (ref 12–15)
ERYTHROCYTE [DISTWIDTH] IN BLOOD BY AUTOMATED COUNT: 13.9 % (ref 12–15)
ERYTHROCYTE [DISTWIDTH] IN BLOOD BY AUTOMATED COUNT: 14 % (ref 12–15)
ERYTHROCYTE [DISTWIDTH] IN BLOOD BY AUTOMATED COUNT: 14.1 % (ref 12–15)
ERYTHROCYTE [DISTWIDTH] IN BLOOD BY AUTOMATED COUNT: 14.2 % (ref 12–15)
ERYTHROCYTE [DISTWIDTH] IN BLOOD BY AUTOMATED COUNT: 14.6 % (ref 12–15)
ERYTHROCYTE [DISTWIDTH] IN BLOOD BY AUTOMATED COUNT: 14.8 % (ref 12–15)
ERYTHROCYTE [DISTWIDTH] IN BLOOD BY AUTOMATED COUNT: 15 % (ref 12–15)
ERYTHROCYTE [DISTWIDTH] IN BLOOD BY AUTOMATED COUNT: 15.1 % (ref 12–15)
ERYTHROCYTE [DISTWIDTH] IN BLOOD BY AUTOMATED COUNT: 15.3 % (ref 12–15)
ERYTHROCYTE [DISTWIDTH] IN BLOOD BY AUTOMATED COUNT: 18.6 % (ref 12.3–15.4)
ERYTHROCYTE [DISTWIDTH] IN BLOOD BY AUTOMATED COUNT: 18.7 % (ref 12–15)
ERYTHROCYTE [DISTWIDTH] IN BLOOD BY AUTOMATED COUNT: 18.8 % (ref 12.3–15.4)
ERYTHROCYTE [DISTWIDTH] IN BLOOD BY AUTOMATED COUNT: 18.9 % (ref 12.3–15.4)
ERYTHROCYTE [DISTWIDTH] IN BLOOD BY AUTOMATED COUNT: 19.1 % (ref 12.3–15.4)
EXPIRATION DATE: NORMAL
FECAL OCCULT BLOOD SCREEN, POC: NEGATIVE
FERRITIN SERPL-MCNC: 698 NG/ML (ref 11.1–264)
FERRITIN SERPL-MCNC: 922 NG/ML (ref 11.1–264)
FOLATE SERPL-MCNC: 6.17 NG/ML (ref 4.78–24.2)
FOLATE SERPL-MCNC: 6.19 NG/ML (ref 4.78–24.2)
GFR SERPL CREATININE-BSD FRML MDRD: 30 ML/MIN/1.73
GFR SERPL CREATININE-BSD FRML MDRD: 31 ML/MIN/1.73
GFR SERPL CREATININE-BSD FRML MDRD: 35 ML/MIN/1.73
GFR SERPL CREATININE-BSD FRML MDRD: 35 ML/MIN/1.73
GFR SERPL CREATININE-BSD FRML MDRD: 36 ML/MIN/1.73
GFR SERPL CREATININE-BSD FRML MDRD: 38 ML/MIN/1.73
GFR SERPL CREATININE-BSD FRML MDRD: 39 ML/MIN/1.73
GFR SERPL CREATININE-BSD FRML MDRD: 41 ML/MIN/1.73
GFR SERPL CREATININE-BSD FRML MDRD: 43 ML/MIN/1.73
GFR SERPL CREATININE-BSD FRML MDRD: 47 ML/MIN/1.73
GFR SERPL CREATININE-BSD FRML MDRD: 56 ML/MIN/1.73
GFR SERPL CREATININE-BSD FRML MDRD: 59 ML/MIN/1.73
GFR SERPL CREATININE-BSD FRML MDRD: 61 ML/MIN/1.73
GFR SERPL CREATININE-BSD FRML MDRD: 64 ML/MIN/1.73
GFR SERPL CREATININE-BSD FRML MDRD: 67 ML/MIN/1.73
GFR SERPL CREATININE-BSD FRML MDRD: 68 ML/MIN/1.73
GFR SERPL CREATININE-BSD FRML MDRD: 69 ML/MIN/1.73
GFR SERPL CREATININE-BSD FRML MDRD: 70 ML/MIN/1.73
GFR SERPL CREATININE-BSD FRML MDRD: 71 ML/MIN/1.73
GFR SERPL CREATININE-BSD FRML MDRD: 73 ML/MIN/1.73
GFR SERPL CREATININE-BSD FRML MDRD: 76 ML/MIN/1.73
GFR SERPL CREATININE-BSD FRML MDRD: 82 ML/MIN/1.73
GLOBULIN UR ELPH-MCNC: 2.5 GM/DL
GLOBULIN UR ELPH-MCNC: 2.5 GM/DL
GLOBULIN UR ELPH-MCNC: 2.7 GM/DL
GLOBULIN UR ELPH-MCNC: 2.8 GM/DL
GLOBULIN UR ELPH-MCNC: 3 GM/DL
GLOBULIN UR ELPH-MCNC: 3.1 GM/DL
GLOBULIN UR ELPH-MCNC: 3.1 GM/DL
GLOBULIN UR ELPH-MCNC: 3.2 GM/DL
GLUCOSE BLD-MCNC: 101 MG/DL (ref 70–100)
GLUCOSE BLD-MCNC: 103 MG/DL (ref 70–100)
GLUCOSE BLD-MCNC: 105 MG/DL (ref 70–100)
GLUCOSE BLD-MCNC: 109 MG/DL (ref 70–100)
GLUCOSE BLD-MCNC: 110 MG/DL (ref 70–100)
GLUCOSE BLD-MCNC: 117 MG/DL (ref 70–100)
GLUCOSE BLD-MCNC: 60 MG/DL (ref 70–100)
GLUCOSE BLD-MCNC: 68 MG/DL (ref 70–100)
GLUCOSE BLD-MCNC: 70 MG/DL (ref 70–100)
GLUCOSE BLD-MCNC: 73 MG/DL (ref 70–100)
GLUCOSE BLD-MCNC: 77 MG/DL (ref 70–100)
GLUCOSE BLD-MCNC: 79 MG/DL (ref 70–100)
GLUCOSE BLD-MCNC: 80 MG/DL (ref 70–100)
GLUCOSE BLD-MCNC: 83 MG/DL (ref 70–100)
GLUCOSE BLD-MCNC: 84 MG/DL (ref 70–100)
GLUCOSE BLD-MCNC: 87 MG/DL (ref 70–100)
GLUCOSE BLD-MCNC: 87 MG/DL (ref 70–100)
GLUCOSE BLD-MCNC: 88 MG/DL (ref 70–100)
GLUCOSE BLD-MCNC: 89 MG/DL (ref 70–100)
GLUCOSE BLD-MCNC: 90 MG/DL (ref 70–100)
GLUCOSE BLD-MCNC: 91 MG/DL (ref 70–100)
GLUCOSE BLD-MCNC: 91 MG/DL (ref 70–100)
GLUCOSE BLD-MCNC: 93 MG/DL (ref 70–100)
GLUCOSE BLD-MCNC: 94 MG/DL (ref 70–100)
GLUCOSE BLD-MCNC: 98 MG/DL (ref 70–100)
GLUCOSE BLDC GLUCOMTR-MCNC: 113 MG/DL (ref 70–130)
GLUCOSE UR STRIP-MCNC: NEGATIVE MG/DL
GRAN CASTS URNS QL MICRO: ABNORMAL /LPF
HBA1C MFR BLD: 5.7 % (ref 4.8–5.9)
HBA1C MFR BLD: 5.9 % (ref 4.8–5.9)
HBA1C MFR BLD: 6.3 %
HBA1C MFR BLD: 6.3 %
HCT VFR BLD AUTO: 26.6 % (ref 34–46.6)
HCT VFR BLD AUTO: 27.6 % (ref 37–47)
HCT VFR BLD AUTO: 28.1 % (ref 37–47)
HCT VFR BLD AUTO: 28.1 % (ref 37–47)
HCT VFR BLD AUTO: 28.5 % (ref 37–47)
HCT VFR BLD AUTO: 28.7 % (ref 37–47)
HCT VFR BLD AUTO: 28.9 % (ref 34–46.6)
HCT VFR BLD AUTO: 29.1 % (ref 37–47)
HCT VFR BLD AUTO: 29.3 % (ref 37–47)
HCT VFR BLD AUTO: 29.9 % (ref 37–47)
HCT VFR BLD AUTO: 30.3 % (ref 37–47)
HCT VFR BLD AUTO: 30.5 % (ref 34–46.6)
HCT VFR BLD AUTO: 30.5 % (ref 37–47)
HCT VFR BLD AUTO: 30.6 % (ref 37–47)
HCT VFR BLD AUTO: 31 % (ref 37–47)
HCT VFR BLD AUTO: 32.4 % (ref 34–46.6)
HCT VFR BLD AUTO: 34.1 % (ref 37–47)
HDLC SERPL-MCNC: 22 MG/DL
HDLC SERPL-MCNC: 24 MG/DL
HDLC SERPL-MCNC: 31 MG/DL
HDLC SERPL-MCNC: 44 MG/DL
HEMOCCULT STL QL: NEGATIVE
HGB BLD-MCNC: 10 G/DL (ref 12–16)
HGB BLD-MCNC: 10.1 G/DL (ref 12–15.9)
HGB BLD-MCNC: 10.1 G/DL (ref 12–16)
HGB BLD-MCNC: 10.2 G/DL (ref 12–16)
HGB BLD-MCNC: 10.5 G/DL (ref 12–16)
HGB BLD-MCNC: 8.3 G/DL (ref 12–15.9)
HGB BLD-MCNC: 8.8 G/DL (ref 12–16)
HGB BLD-MCNC: 9.1 G/DL (ref 12–15.9)
HGB BLD-MCNC: 9.1 G/DL (ref 12–16)
HGB BLD-MCNC: 9.2 G/DL (ref 12–16)
HGB BLD-MCNC: 9.3 G/DL (ref 12–16)
HGB BLD-MCNC: 9.6 G/DL (ref 12–16)
HGB BLD-MCNC: 9.6 G/DL (ref 12–16)
HGB BLD-MCNC: 9.7 G/DL (ref 12–16)
HGB BLD-MCNC: 9.8 G/DL (ref 12–15.9)
HGB BLD-MCNC: 9.8 G/DL (ref 12–16)
HGB BLD-MCNC: 9.8 G/DL (ref 12–16)
HGB UR QL STRIP.AUTO: ABNORMAL
HGB UR QL STRIP.AUTO: NEGATIVE
HGB UR QL STRIP.AUTO: NEGATIVE
HOLD SPECIMEN: NORMAL
HYALINE CASTS UR QL AUTO: ABNORMAL /LPF
HYPOCHROMIA BLD QL: ABNORMAL
IMM GRANULOCYTES # BLD AUTO: 0.02 10*3/MM3 (ref 0–0.03)
IMM GRANULOCYTES # BLD AUTO: 0.27 10*3/MM3 (ref 0–0.05)
IMM GRANULOCYTES # BLD AUTO: 0.39 10*3/MM3 (ref 0–0.05)
IMM GRANULOCYTES # BLD AUTO: 0.41 10*3/MM3 (ref 0–0.05)
IMM GRANULOCYTES # BLD AUTO: 0.42 10*3/MM3 (ref 0–0.05)
IMM GRANULOCYTES # BLD AUTO: 0.46 10*3/MM3 (ref 0–0.05)
IMM GRANULOCYTES # BLD AUTO: 0.5 10*3/MM3 (ref 0–0.05)
IMM GRANULOCYTES # BLD AUTO: 0.52 10*3/MM3 (ref 0–0.05)
IMM GRANULOCYTES # BLD AUTO: 0.52 10*3/MM3 (ref 0–0.05)
IMM GRANULOCYTES # BLD AUTO: 0.53 10*3/MM3 (ref 0–0.05)
IMM GRANULOCYTES # BLD AUTO: 0.76 10*3/MM3 (ref 0–0.05)
IMM GRANULOCYTES NFR BLD AUTO: 0.3 % (ref 0–5)
IMM GRANULOCYTES NFR BLD AUTO: 2 % (ref 0–5)
IMM GRANULOCYTES NFR BLD AUTO: 2.5 % (ref 0–5)
IMM GRANULOCYTES NFR BLD AUTO: 2.6 % (ref 0–5)
IMM GRANULOCYTES NFR BLD AUTO: 3 % (ref 0–0.5)
IMM GRANULOCYTES NFR BLD AUTO: 3.6 % (ref 0–5)
IMM GRANULOCYTES NFR BLD AUTO: 3.7 % (ref 0–0.5)
IMM GRANULOCYTES NFR BLD AUTO: 3.9 % (ref 0–0.5)
IMM GRANULOCYTES NFR BLD AUTO: 4 % (ref 0–5)
IMM GRANULOCYTES NFR BLD AUTO: 4.1 % (ref 0–5)
IMM GRANULOCYTES NFR BLD AUTO: 4.3 % (ref 0–0.5)
INR PPP: 1.02 (ref 0.91–1.09)
IRON 24H UR-MRATE: 22 MCG/DL (ref 42–180)
IRON 24H UR-MRATE: 44 MCG/DL (ref 42–180)
IRON SATN MFR SERPL: 17 % (ref 20–45)
IRON SATN MFR SERPL: 38 % (ref 20–45)
KETONES UR QL STRIP: NEGATIVE
L PNEUMO1 AG UR QL IA: NEGATIVE
LDLC/HDLC SERPL: 2.4 {RATIO}
LDLC/HDLC SERPL: 2.81 {RATIO}
LDLC/HDLC SERPL: 3.55 {RATIO}
LDLC/HDLC SERPL: 3.58 {RATIO}
LEFT ATRIUM VOLUME INDEX: 24 ML/M2
LEUKOCYTE ESTERASE UR QL STRIP.AUTO: ABNORMAL
LEUKOCYTE ESTERASE UR QL STRIP.AUTO: NEGATIVE
LYMPHOCYTES # BLD AUTO: 1.11 10*3/MM3 (ref 0.72–4.86)
LYMPHOCYTES # BLD AUTO: 1.11 10*3/MM3 (ref 0.72–4.86)
LYMPHOCYTES # BLD AUTO: 1.19 10*3/MM3 (ref 0.72–4.86)
LYMPHOCYTES # BLD AUTO: 1.42 10*3/MM3 (ref 0.72–4.86)
LYMPHOCYTES # BLD AUTO: 1.46 10*3/MM3 (ref 0.72–4.86)
LYMPHOCYTES # BLD AUTO: 2.27 10*3/MM3 (ref 0.72–4.86)
LYMPHOCYTES # BLD AUTO: 2.86 10*3/MM3 (ref 0.7–3.1)
LYMPHOCYTES # BLD AUTO: 3.43 10*3/MM3 (ref 0.7–3.1)
LYMPHOCYTES # BLD AUTO: 3.5 10*3/MM3 (ref 0.7–3.1)
LYMPHOCYTES # BLD AUTO: 4.26 10*3/MM3 (ref 0.72–4.86)
LYMPHOCYTES # BLD AUTO: 4.26 10*3/MM3 (ref 0.7–3.1)
LYMPHOCYTES # BLD MANUAL: 0.35 10*3/MM3 (ref 0.72–4.86)
LYMPHOCYTES # BLD MANUAL: 0.37 10*3/MM3 (ref 0.72–4.86)
LYMPHOCYTES # BLD MANUAL: 0.55 10*3/MM3 (ref 0.72–4.86)
LYMPHOCYTES # BLD MANUAL: 0.85 10*3/MM3 (ref 0.72–4.86)
LYMPHOCYTES # BLD MANUAL: 1.35 10*3/MM3 (ref 0.72–4.86)
LYMPHOCYTES # BLD MANUAL: 2.03 10*3/MM3 (ref 0.72–4.86)
LYMPHOCYTES # BLD MANUAL: 2.12 10*3/MM3 (ref 0.72–4.86)
LYMPHOCYTES NFR BLD AUTO: 23.7 % (ref 19.6–45.3)
LYMPHOCYTES NFR BLD AUTO: 25.1 % (ref 19.6–45.3)
LYMPHOCYTES NFR BLD AUTO: 26 % (ref 19.6–45.3)
LYMPHOCYTES NFR BLD AUTO: 30.6 % (ref 19.6–45.3)
LYMPHOCYTES NFR BLD AUTO: 31.4 % (ref 15–45)
LYMPHOCYTES NFR BLD AUTO: 39 % (ref 15–45)
LYMPHOCYTES NFR BLD AUTO: 7.6 % (ref 15–45)
LYMPHOCYTES NFR BLD AUTO: 7.6 % (ref 15–45)
LYMPHOCYTES NFR BLD AUTO: 8.3 % (ref 15–45)
LYMPHOCYTES NFR BLD AUTO: 8.7 % (ref 15–45)
LYMPHOCYTES NFR BLD AUTO: 8.9 % (ref 15–45)
LYMPHOCYTES NFR BLD MANUAL: 11 % (ref 15–45)
LYMPHOCYTES NFR BLD MANUAL: 12 % (ref 15–45)
LYMPHOCYTES NFR BLD MANUAL: 2 % (ref 15–45)
LYMPHOCYTES NFR BLD MANUAL: 2 % (ref 15–45)
LYMPHOCYTES NFR BLD MANUAL: 2 % (ref 4–12)
LYMPHOCYTES NFR BLD MANUAL: 2 % (ref 4–12)
LYMPHOCYTES NFR BLD MANUAL: 3 % (ref 15–45)
LYMPHOCYTES NFR BLD MANUAL: 3 % (ref 4–12)
LYMPHOCYTES NFR BLD MANUAL: 3 % (ref 4–12)
LYMPHOCYTES NFR BLD MANUAL: 4 % (ref 4–12)
LYMPHOCYTES NFR BLD MANUAL: 4 % (ref 4–12)
LYMPHOCYTES NFR BLD MANUAL: 5 % (ref 15–45)
LYMPHOCYTES NFR BLD MANUAL: 6 % (ref 15–45)
LYMPHOCYTES NFR BLD MANUAL: 6 % (ref 4–12)
Lab: 147
MAGNESIUM SERPL-MCNC: 1.7 MG/DL (ref 1.4–2.2)
MAGNESIUM SERPL-MCNC: 1.8 MG/DL (ref 1.4–2.2)
MAXIMAL PREDICTED HEART RATE: 147 BPM
MAXIMAL PREDICTED HEART RATE: 147 BPM
MCH RBC QN AUTO: 25.3 PG (ref 28–32)
MCH RBC QN AUTO: 25.5 PG (ref 28–32)
MCH RBC QN AUTO: 25.7 PG (ref 28–32)
MCH RBC QN AUTO: 25.7 PG (ref 28–32)
MCH RBC QN AUTO: 25.8 PG (ref 28–32)
MCH RBC QN AUTO: 25.8 PG (ref 28–32)
MCH RBC QN AUTO: 25.9 PG (ref 26.6–33)
MCH RBC QN AUTO: 25.9 PG (ref 28–32)
MCH RBC QN AUTO: 26 PG (ref 26.6–33)
MCH RBC QN AUTO: 26 PG (ref 28–32)
MCH RBC QN AUTO: 26.2 PG (ref 26.6–33)
MCH RBC QN AUTO: 26.2 PG (ref 28–32)
MCH RBC QN AUTO: 26.2 PG (ref 28–32)
MCH RBC QN AUTO: 26.3 PG (ref 26.6–33)
MCH RBC QN AUTO: 26.3 PG (ref 28–32)
MCH RBC QN AUTO: 26.7 PG (ref 28–32)
MCHC RBC AUTO-ENTMCNC: 30.8 G/DL (ref 33–36)
MCHC RBC AUTO-ENTMCNC: 31.2 G/DL (ref 31.5–35.7)
MCHC RBC AUTO-ENTMCNC: 31.2 G/DL (ref 31.5–35.7)
MCHC RBC AUTO-ENTMCNC: 31.5 G/DL (ref 31.5–35.7)
MCHC RBC AUTO-ENTMCNC: 31.9 G/DL (ref 33–36)
MCHC RBC AUTO-ENTMCNC: 32.1 G/DL (ref 31.5–35.7)
MCHC RBC AUTO-ENTMCNC: 32.4 G/DL (ref 33–36)
MCHC RBC AUTO-ENTMCNC: 32.6 G/DL (ref 33–36)
MCHC RBC AUTO-ENTMCNC: 32.7 G/DL (ref 33–36)
MCHC RBC AUTO-ENTMCNC: 32.8 G/DL (ref 33–36)
MCHC RBC AUTO-ENTMCNC: 32.9 G/DL (ref 33–36)
MCHC RBC AUTO-ENTMCNC: 33 G/DL (ref 33–36)
MCHC RBC AUTO-ENTMCNC: 33 G/DL (ref 33–36)
MCHC RBC AUTO-ENTMCNC: 33.3 G/DL (ref 33–36)
MCHC RBC AUTO-ENTMCNC: 33.4 G/DL (ref 33–36)
MCHC RBC AUTO-ENTMCNC: 33.4 G/DL (ref 33–36)
MCHC RBC AUTO-ENTMCNC: 33.7 G/DL (ref 33–36)
MCHC RBC AUTO-ENTMCNC: 33.7 G/DL (ref 33–36)
MCHC RBC AUTO-ENTMCNC: 34.1 G/DL (ref 33–36)
MCV RBC AUTO: 76.7 FL (ref 82–98)
MCV RBC AUTO: 76.7 FL (ref 82–98)
MCV RBC AUTO: 76.8 FL (ref 82–98)
MCV RBC AUTO: 76.8 FL (ref 82–98)
MCV RBC AUTO: 76.9 FL (ref 82–98)
MCV RBC AUTO: 77.7 FL (ref 82–98)
MCV RBC AUTO: 78.4 FL (ref 82–98)
MCV RBC AUTO: 78.5 FL (ref 82–98)
MCV RBC AUTO: 78.9 FL (ref 82–98)
MCV RBC AUTO: 79.4 FL (ref 82–98)
MCV RBC AUTO: 79.6 FL (ref 82–98)
MCV RBC AUTO: 79.7 FL (ref 82–98)
MCV RBC AUTO: 80 FL (ref 82–98)
MCV RBC AUTO: 81.5 FL (ref 82–98)
MCV RBC AUTO: 82 FL (ref 79–97)
MCV RBC AUTO: 82.6 FL (ref 79–97)
MCV RBC AUTO: 83.1 FL (ref 79–97)
MCV RBC AUTO: 83.9 FL (ref 79–97)
MCV RBC AUTO: 84.2 FL (ref 82–98)
METAMYELOCYTES NFR BLD MANUAL: 1 % (ref 0–0)
METAMYELOCYTES NFR BLD MANUAL: 2 % (ref 0–0)
METAMYELOCYTES NFR BLD MANUAL: 2 % (ref 0–0)
METAMYELOCYTES NFR BLD MANUAL: 3 % (ref 0–0)
METAMYELOCYTES NFR BLD MANUAL: 4 % (ref 0–0)
MICROCYTES BLD QL: ABNORMAL
MONOCYTES # BLD AUTO: 0.35 10*3/MM3 (ref 0.19–1.3)
MONOCYTES # BLD AUTO: 0.37 10*3/MM3 (ref 0.19–1.3)
MONOCYTES # BLD AUTO: 0.37 10*3/MM3 (ref 0.19–1.3)
MONOCYTES # BLD AUTO: 0.47 10*3/MM3 (ref 0.19–1.3)
MONOCYTES # BLD AUTO: 0.53 10*3/MM3 (ref 0.19–1.3)
MONOCYTES # BLD AUTO: 0.56 10*3/MM3 (ref 0.19–1.3)
MONOCYTES # BLD AUTO: 0.66 10*3/MM3 (ref 0.19–1.3)
MONOCYTES # BLD AUTO: 0.66 10*3/MM3 (ref 0.19–1.3)
MONOCYTES # BLD AUTO: 0.68 10*3/MM3 (ref 0.19–1.3)
MONOCYTES # BLD AUTO: 0.77 10*3/MM3 (ref 0.19–1.3)
MONOCYTES # BLD AUTO: 0.87 10*3/MM3 (ref 0.19–1.3)
MONOCYTES # BLD AUTO: 0.87 10*3/MM3 (ref 0.1–0.9)
MONOCYTES # BLD AUTO: 0.95 10*3/MM3 (ref 0.19–1.3)
MONOCYTES # BLD AUTO: 1.07 10*3/MM3 (ref 0.1–0.9)
MONOCYTES # BLD AUTO: 1.08 10*3/MM3 (ref 0.1–0.9)
MONOCYTES # BLD AUTO: 1.1 10*3/MM3 (ref 0.19–1.3)
MONOCYTES # BLD AUTO: 1.21 10*3/MM3 (ref 0.1–0.9)
MONOCYTES # BLD AUTO: 1.35 10*3/MM3 (ref 0.19–1.3)
MONOCYTES NFR BLD AUTO: 3.5 % (ref 4–12)
MONOCYTES NFR BLD AUTO: 4.2 % (ref 4–12)
MONOCYTES NFR BLD AUTO: 4.4 % (ref 4–12)
MONOCYTES NFR BLD AUTO: 5.8 % (ref 4–12)
MONOCYTES NFR BLD AUTO: 5.8 % (ref 4–12)
MONOCYTES NFR BLD AUTO: 6.4 % (ref 4–12)
MONOCYTES NFR BLD AUTO: 7.1 % (ref 5–12)
MONOCYTES NFR BLD AUTO: 7.8 % (ref 5–12)
MONOCYTES NFR BLD AUTO: 8 % (ref 5–12)
MONOCYTES NFR BLD AUTO: 8.1 % (ref 4–12)
MONOCYTES NFR BLD AUTO: 8.9 % (ref 5–12)
MYELOCYTES NFR BLD MANUAL: 1 % (ref 0–0)
MYELOCYTES NFR BLD MANUAL: 2 % (ref 0–0)
NEGATIVE CONTROL: NEGATIVE
NEUTROPHILS # BLD AUTO: 10.18 10*3/MM3 (ref 1.87–8.4)
NEUTROPHILS # BLD AUTO: 10.46 10*3/MM3 (ref 1.87–8.4)
NEUTROPHILS # BLD AUTO: 10.47 10*3/MM3 (ref 1.87–8.4)
NEUTROPHILS # BLD AUTO: 13.13 10*3/MM3 (ref 1.87–8.4)
NEUTROPHILS # BLD AUTO: 13.13 10*3/MM3 (ref 1.87–8.4)
NEUTROPHILS # BLD AUTO: 14.13 10*3/MM3 (ref 1.87–8.4)
NEUTROPHILS # BLD AUTO: 14.15 10*3/MM3 (ref 1.87–8.4)
NEUTROPHILS # BLD AUTO: 15.26 10*3/MM3 (ref 1.87–8.4)
NEUTROPHILS # BLD AUTO: 15.52 10*3/MM3 (ref 1.87–8.4)
NEUTROPHILS # BLD AUTO: 16.61 10*3/MM3 (ref 1.87–8.4)
NEUTROPHILS # BLD AUTO: 18.96 10*3/MM3 (ref 1.87–8.4)
NEUTROPHILS # BLD AUTO: 2.8 10*3/MM3 (ref 1.87–8.4)
NEUTROPHILS # BLD AUTO: 24.79 10*3/MM3 (ref 1.87–8.4)
NEUTROPHILS # BLD AUTO: 5.17 10*3/MM3 (ref 1.7–7)
NEUTROPHILS # BLD AUTO: 6.2 10*3/MM3 (ref 1.7–7)
NEUTROPHILS # BLD AUTO: 6.46 10*3/MM3 (ref 1.7–7)
NEUTROPHILS # BLD AUTO: 6.93 10*3/MM3 (ref 1.87–8.4)
NEUTROPHILS # BLD AUTO: 9.28 10*3/MM3 (ref 1.7–7)
NEUTROPHILS NFR BLD AUTO: 46.1 % (ref 42.7–76)
NEUTROPHILS NFR BLD AUTO: 48 % (ref 42.7–76)
NEUTROPHILS NFR BLD AUTO: 48.2 % (ref 39–78)
NEUTROPHILS NFR BLD AUTO: 51.1 % (ref 39–78)
NEUTROPHILS NFR BLD AUTO: 51.4 % (ref 42.7–76)
NEUTROPHILS NFR BLD AUTO: 54.8 % (ref 42.7–76)
NEUTROPHILS NFR BLD AUTO: 78.6 % (ref 39–78)
NEUTROPHILS NFR BLD AUTO: 79.7 % (ref 39–78)
NEUTROPHILS NFR BLD AUTO: 80.4 % (ref 39–78)
NEUTROPHILS NFR BLD AUTO: 81.5 % (ref 39–78)
NEUTROPHILS NFR BLD AUTO: 83.6 % (ref 39–78)
NEUTROPHILS NFR BLD MANUAL: 78 % (ref 39–78)
NEUTROPHILS NFR BLD MANUAL: 79 % (ref 39–78)
NEUTROPHILS NFR BLD MANUAL: 80 % (ref 39–78)
NEUTROPHILS NFR BLD MANUAL: 81 % (ref 39–78)
NEUTROPHILS NFR BLD MANUAL: 89 % (ref 39–78)
NEUTROPHILS NFR BLD MANUAL: 89.9 % (ref 39–78)
NEUTROPHILS NFR BLD MANUAL: 89.9 % (ref 39–78)
NEUTS BAND NFR BLD MANUAL: 1 % (ref 0–10)
NEUTS BAND NFR BLD MANUAL: 2 % (ref 0–10)
NEUTS BAND NFR BLD MANUAL: 2 % (ref 0–10)
NEUTS BAND NFR BLD MANUAL: 4 % (ref 0–10)
NEUTS BAND NFR BLD MANUAL: 5 % (ref 0–10)
NITRITE UR QL STRIP: NEGATIVE
NRBC BLD AUTO-RTO: 0 /100 WBC (ref 0–0)
NRBC BLD AUTO-RTO: 0 /100 WBC (ref 0–0.2)
NRBC BLD AUTO-RTO: 0.1 /100 WBC (ref 0–0.2)
NRBC BLD AUTO-RTO: 0.1 /100 WBC (ref 0–0.2)
NT-PROBNP SERPL-MCNC: 1430 PG/ML (ref 0–900)
NT-PROBNP SERPL-MCNC: 1530 PG/ML (ref 0–900)
NT-PROBNP SERPL-MCNC: 464 PG/ML (ref 0–900)
NT-PROBNP SERPL-MCNC: 665 PG/ML (ref 0–900)
PERCENT MAX PREDICTED HR: 111.56 %
PH UR STRIP.AUTO: 6.5 [PH] (ref 5–8)
PH UR STRIP.AUTO: 6.5 [PH] (ref 5–8)
PH UR STRIP.AUTO: 8.5 [PH] (ref 5–8)
PH UR STRIP.AUTO: <=5 [PH] (ref 5–8)
PH UR STRIP.AUTO: <=5 [PH] (ref 5–8)
PH UR STRIP.AUTO: >=9 [PH] (ref 5–8)
PLAT MORPH BLD: NORMAL
PLATELET # BLD AUTO: 182 10*3/MM3 (ref 140–450)
PLATELET # BLD AUTO: 184 10*3/MM3 (ref 140–450)
PLATELET # BLD AUTO: 203 10*3/MM3 (ref 140–450)
PLATELET # BLD AUTO: 210 10*3/MM3 (ref 130–400)
PLATELET # BLD AUTO: 220 10*3/MM3 (ref 140–450)
PLATELET # BLD AUTO: 260 10*3/MM3 (ref 130–400)
PLATELET # BLD AUTO: 268 10*3/MM3 (ref 130–400)
PLATELET # BLD AUTO: 276 10*3/MM3 (ref 130–400)
PLATELET # BLD AUTO: 282 10*3/MM3 (ref 130–400)
PLATELET # BLD AUTO: 283 10*3/MM3 (ref 130–400)
PLATELET # BLD AUTO: 287 10*3/MM3 (ref 130–400)
PLATELET # BLD AUTO: 298 10*3/MM3 (ref 130–400)
PLATELET # BLD AUTO: 300 10*3/MM3 (ref 130–400)
PLATELET # BLD AUTO: 306 10*3/MM3 (ref 130–400)
PLATELET # BLD AUTO: 308 10*3/MM3 (ref 130–400)
PLATELET # BLD AUTO: 321 10*3/MM3 (ref 130–400)
PLATELET # BLD AUTO: 323 10*3/MM3 (ref 130–400)
PLATELET # BLD AUTO: 335 10*3/MM3 (ref 130–400)
PLATELET # BLD AUTO: 354 10*3/MM3 (ref 130–400)
PMV BLD AUTO: 10 FL (ref 6–12)
PMV BLD AUTO: 10.1 FL (ref 6–12)
PMV BLD AUTO: 10.1 FL (ref 6–12)
PMV BLD AUTO: 10.2 FL (ref 6–12)
PMV BLD AUTO: 10.3 FL (ref 6–12)
PMV BLD AUTO: 10.4 FL (ref 6–12)
PMV BLD AUTO: 10.5 FL (ref 6–12)
PMV BLD AUTO: 10.7 FL (ref 6–12)
PMV BLD AUTO: 11.1 FL (ref 6–12)
PMV BLD AUTO: 11.2 FL (ref 6–12)
PMV BLD AUTO: 9.6 FL (ref 6–12)
PMV BLD AUTO: 9.7 FL (ref 6–12)
PMV BLD AUTO: 9.8 FL (ref 6–12)
PMV BLD AUTO: 9.8 FL (ref 6–12)
PMV BLD AUTO: 9.9 FL (ref 6–12)
POIKILOCYTOSIS BLD QL SMEAR: ABNORMAL
POSITIVE CONTROL: POSITIVE
POTASSIUM BLD-SCNC: 3.2 MMOL/L (ref 3.5–5.3)
POTASSIUM BLD-SCNC: 3.2 MMOL/L (ref 3.5–5.3)
POTASSIUM BLD-SCNC: 3.3 MMOL/L (ref 3.5–5.3)
POTASSIUM BLD-SCNC: 3.4 MMOL/L (ref 3.5–5.3)
POTASSIUM BLD-SCNC: 3.5 MMOL/L (ref 3.5–5.3)
POTASSIUM BLD-SCNC: 3.6 MMOL/L (ref 3.5–5.3)
POTASSIUM BLD-SCNC: 3.7 MMOL/L (ref 3.5–5.3)
POTASSIUM BLD-SCNC: 3.7 MMOL/L (ref 3.5–5.3)
POTASSIUM BLD-SCNC: 3.8 MMOL/L (ref 3.5–5.3)
POTASSIUM BLD-SCNC: 3.9 MMOL/L (ref 3.5–5.3)
POTASSIUM BLD-SCNC: 3.9 MMOL/L (ref 3.5–5.3)
POTASSIUM BLD-SCNC: 4 MMOL/L (ref 3.5–5.3)
POTASSIUM BLD-SCNC: 4.1 MMOL/L (ref 3.5–5.3)
POTASSIUM BLD-SCNC: 4.2 MMOL/L (ref 3.5–5.3)
POTASSIUM BLD-SCNC: 4.2 MMOL/L (ref 3.5–5.3)
POTASSIUM BLD-SCNC: 4.3 MMOL/L (ref 3.5–5.3)
POTASSIUM BLD-SCNC: 4.4 MMOL/L (ref 3.5–5.3)
POTASSIUM BLD-SCNC: 4.4 MMOL/L (ref 3.5–5.3)
PREALB SERPL-MCNC: 4 MG/DL (ref 18–36)
PREALB SERPL-MCNC: 7.3 MG/DL (ref 18–36)
PREALB SERPL-MCNC: 8.9 MG/DL (ref 18–36)
PROMYELOCYTES NFR BLD MANUAL: 1 % (ref 0–0)
PROT SERPL-MCNC: 4.3 G/DL (ref 6.3–8.7)
PROT SERPL-MCNC: 4.3 G/DL (ref 6.3–8.7)
PROT SERPL-MCNC: 4.5 G/DL (ref 6.3–8.7)
PROT SERPL-MCNC: 4.6 G/DL (ref 6.3–8.7)
PROT SERPL-MCNC: 4.6 G/DL (ref 6.3–8.7)
PROT SERPL-MCNC: 4.7 G/DL (ref 6.3–8.7)
PROT SERPL-MCNC: 4.8 G/DL (ref 6.3–8.7)
PROT SERPL-MCNC: 4.9 G/DL (ref 6.3–8.7)
PROT SERPL-MCNC: 5 G/DL (ref 6.3–8.7)
PROT SERPL-MCNC: 5.1 G/DL (ref 6.3–8.7)
PROT SERPL-MCNC: 5.4 G/DL (ref 6.3–8.7)
PROT SERPL-MCNC: 5.6 G/DL (ref 6.3–8.7)
PROT SERPL-MCNC: 5.7 G/DL (ref 6.3–8.7)
PROT SERPL-MCNC: 5.9 G/DL (ref 6.3–8.7)
PROT SERPL-MCNC: 6 G/DL (ref 6.3–8.7)
PROT UR QL STRIP: ABNORMAL
PROT UR QL STRIP: NEGATIVE
PROTHROMBIN TIME: 13.7 SECONDS (ref 11.9–14.6)
RBC # BLD AUTO: 3.2 10*6/MM3 (ref 3.77–5.28)
RBC # BLD AUTO: 3.45 10*6/MM3 (ref 4.2–5.4)
RBC # BLD AUTO: 3.5 10*6/MM3 (ref 3.77–5.28)
RBC # BLD AUTO: 3.53 10*6/MM3 (ref 4.2–5.4)
RBC # BLD AUTO: 3.54 10*6/MM3 (ref 4.2–5.4)
RBC # BLD AUTO: 3.67 10*6/MM3 (ref 4.2–5.4)
RBC # BLD AUTO: 3.67 10*6/MM3 (ref 4.2–5.4)
RBC # BLD AUTO: 3.69 10*6/MM3 (ref 4.2–5.4)
RBC # BLD AUTO: 3.71 10*6/MM3 (ref 4.2–5.4)
RBC # BLD AUTO: 3.72 10*6/MM3 (ref 3.77–5.28)
RBC # BLD AUTO: 3.74 10*6/MM3 (ref 4.2–5.4)
RBC # BLD AUTO: 3.79 10*6/MM3 (ref 4.2–5.4)
RBC # BLD AUTO: 3.81 10*6/MM3 (ref 4.2–5.4)
RBC # BLD AUTO: 3.86 10*6/MM3 (ref 3.77–5.28)
RBC # BLD AUTO: 3.89 10*6/MM3 (ref 4.2–5.4)
RBC # BLD AUTO: 3.9 10*6/MM3 (ref 4.2–5.4)
RBC # BLD AUTO: 3.95 10*6/MM3 (ref 4.2–5.4)
RBC # BLD AUTO: 3.97 10*6/MM3 (ref 4.2–5.4)
RBC # BLD AUTO: 4.05 10*6/MM3 (ref 4.2–5.4)
RBC # UR: ABNORMAL /HPF
RBC MORPH BLD: NORMAL
REF LAB TEST METHOD: ABNORMAL
RETICS # AUTO: 0.05 10*6/MM3 (ref 0.02–0.13)
RETICS/RBC NFR AUTO: 1.64 % (ref 0.7–1.9)
RH BLD: POSITIVE
S PNEUM AG SPEC QL LA: NEGATIVE
SODIUM BLD-SCNC: 134 MMOL/L (ref 135–145)
SODIUM BLD-SCNC: 134 MMOL/L (ref 135–145)
SODIUM BLD-SCNC: 135 MMOL/L (ref 135–145)
SODIUM BLD-SCNC: 135 MMOL/L (ref 135–145)
SODIUM BLD-SCNC: 136 MMOL/L (ref 135–145)
SODIUM BLD-SCNC: 137 MMOL/L (ref 135–145)
SODIUM BLD-SCNC: 139 MMOL/L (ref 135–145)
SODIUM BLD-SCNC: 140 MMOL/L (ref 135–145)
SODIUM BLD-SCNC: 141 MMOL/L (ref 135–145)
SODIUM BLD-SCNC: 142 MMOL/L (ref 135–145)
SODIUM BLD-SCNC: 143 MMOL/L (ref 135–145)
SODIUM BLD-SCNC: 144 MMOL/L (ref 135–145)
SODIUM BLD-SCNC: 147 MMOL/L (ref 135–145)
SODIUM BLD-SCNC: 149 MMOL/L (ref 135–145)
SODIUM BLD-SCNC: 149 MMOL/L (ref 135–145)
SP GR UR STRIP: 1.01 (ref 1–1.03)
SP GR UR STRIP: 1.02 (ref 1–1.03)
SQUAMOUS #/AREA URNS HPF: ABNORMAL /HPF
STRESS BASELINE BP: NORMAL MMHG
STRESS BASELINE HR: 62 BPM
STRESS PERCENT HR: 131 %
STRESS POST EXERCISE DUR MIN: 7 MIN
STRESS POST EXERCISE DUR SEC: 37 SEC
STRESS POST PEAK BP: NORMAL MMHG
STRESS POST PEAK HR: 164 BPM
STRESS TARGET HR: 125 BPM
STRESS TARGET HR: 125 BPM
T&S EXPIRATION DATE: NORMAL
T4 FREE SERPL-MCNC: 1.71 NG/DL (ref 0.78–2.19)
TARGETS BLD QL SMEAR: ABNORMAL
TIBC SERPL-MCNC: 116 MCG/DL (ref 225–420)
TIBC SERPL-MCNC: 132 MCG/DL (ref 225–420)
TRIGL SERPL-MCNC: 102 MG/DL (ref 0–149)
TRIGL SERPL-MCNC: 114 MG/DL (ref 0–149)
TRIGL SERPL-MCNC: 121 MG/DL (ref 0–149)
TRIGL SERPL-MCNC: 124 MG/DL (ref 0–149)
TROPONIN I SERPL-MCNC: 0.09 NG/ML (ref 0–0.03)
TROPONIN I SERPL-MCNC: 0.1 NG/ML (ref 0–0.03)
TROPONIN I SERPL-MCNC: 0.11 NG/ML (ref 0–0.03)
TROPONIN I SERPL-MCNC: 0.11 NG/ML (ref 0–0.03)
TROPONIN I SERPL-MCNC: 0.23 NG/ML (ref 0–0.03)
TROPONIN I SERPL-MCNC: 0.23 NG/ML (ref 0–0.03)
TROPONIN I SERPL-MCNC: 0.26 NG/ML (ref 0–0.03)
TROPONIN I SERPL-MCNC: <0.012 NG/ML (ref 0–0.03)
TSH SERPL DL<=0.05 MIU/L-ACNC: 3.67 MIU/ML (ref 0.47–4.68)
TSH SERPL DL<=0.05 MIU/L-ACNC: 3.82 MIU/ML (ref 0.47–4.68)
TSH SERPL DL<=0.05 MIU/L-ACNC: 3.98 MIU/ML (ref 0.47–4.68)
TSH SERPL DL<=0.05 MIU/L-ACNC: 5.13 MIU/ML (ref 0.47–4.68)
TSH SERPL DL<=0.05 MIU/L-ACNC: 6.64 MIU/ML (ref 0.47–4.68)
UNIT  ABO: NORMAL
UNIT  RH: NORMAL
UROBILINOGEN UR QL STRIP: ABNORMAL
UROBILINOGEN UR QL STRIP: NORMAL
VARIANT LYMPHS NFR BLD MANUAL: 1 % (ref 0–5)
VARIANT LYMPHS NFR BLD MANUAL: 2 % (ref 0–5)
VARIANT LYMPHS NFR BLD MANUAL: 2 % (ref 0–5)
VIT B12 BLD-MCNC: 676 PG/ML (ref 239–931)
VIT B12 BLD-MCNC: 786 PG/ML (ref 239–931)
VIT B12 BLD-MCNC: 864 PG/ML (ref 239–931)
VIT B12 BLD-MCNC: 913 PG/ML (ref 239–931)
WBC MORPH BLD: NORMAL
WBC NRBC COR # BLD: 11.2 10*3/MM3 (ref 3.4–10.8)
WBC NRBC COR # BLD: 11.64 10*3/MM3 (ref 4.8–10.8)
WBC NRBC COR # BLD: 12.06 10*3/MM3 (ref 3.4–10.8)
WBC NRBC COR # BLD: 12.77 10*3/MM3 (ref 4.8–10.8)
WBC NRBC COR # BLD: 13.32 10*3/MM3 (ref 4.8–10.8)
WBC NRBC COR # BLD: 13.46 10*3/MM3 (ref 3.4–10.8)
WBC NRBC COR # BLD: 13.56 10*3/MM3 (ref 4.8–10.8)
WBC NRBC COR # BLD: 13.6 10*3/MM3 (ref 4.8–10.8)
WBC NRBC COR # BLD: 15.7 10*3/MM3 (ref 4.8–10.8)
WBC NRBC COR # BLD: 16.34 10*3/MM3 (ref 4.8–10.8)
WBC NRBC COR # BLD: 16.94 10*3/MM3 (ref 3.4–10.8)
WBC NRBC COR # BLD: 17.02 10*3/MM3 (ref 4.8–10.8)
WBC NRBC COR # BLD: 17.69 10*3/MM3 (ref 4.8–10.8)
WBC NRBC COR # BLD: 18.46 10*3/MM3 (ref 4.8–10.8)
WBC NRBC COR # BLD: 18.48 10*3/MM3 (ref 4.8–10.8)
WBC NRBC COR # BLD: 18.72 10*3/MM3 (ref 4.8–10.8)
WBC NRBC COR # BLD: 22.57 10*3/MM3 (ref 4.8–10.8)
WBC NRBC COR # BLD: 27.57 10*3/MM3 (ref 4.8–10.8)
WBC NRBC COR # BLD: 5.82 10*3/MM3 (ref 4.8–10.8)
WBC UR QL AUTO: ABNORMAL /HPF
WHOLE BLOOD HOLD SPECIMEN: NORMAL

## 2019-01-01 PROCEDURE — 77063 BREAST TOMOSYNTHESIS BI: CPT

## 2019-01-01 PROCEDURE — 80076 HEPATIC FUNCTION PANEL: CPT | Performed by: INTERNAL MEDICINE

## 2019-01-01 PROCEDURE — 94799 UNLISTED PULMONARY SVC/PX: CPT

## 2019-01-01 PROCEDURE — 85025 COMPLETE CBC W/AUTO DIFF WBC: CPT | Performed by: EMERGENCY MEDICINE

## 2019-01-01 PROCEDURE — 93005 ELECTROCARDIOGRAM TRACING: CPT | Performed by: EMERGENCY MEDICINE

## 2019-01-01 PROCEDURE — 97535 SELF CARE MNGMENT TRAINING: CPT

## 2019-01-01 PROCEDURE — 82607 VITAMIN B-12: CPT | Performed by: FAMILY MEDICINE

## 2019-01-01 PROCEDURE — 97530 THERAPEUTIC ACTIVITIES: CPT

## 2019-01-01 PROCEDURE — 81001 URINALYSIS AUTO W/SCOPE: CPT | Performed by: EMERGENCY MEDICINE

## 2019-01-01 PROCEDURE — 97116 GAIT TRAINING THERAPY: CPT

## 2019-01-01 PROCEDURE — 85025 COMPLETE CBC W/AUTO DIFF WBC: CPT | Performed by: FAMILY MEDICINE

## 2019-01-01 PROCEDURE — 85027 COMPLETE CBC AUTOMATED: CPT | Performed by: INTERNAL MEDICINE

## 2019-01-01 PROCEDURE — 83735 ASSAY OF MAGNESIUM: CPT | Performed by: EMERGENCY MEDICINE

## 2019-01-01 PROCEDURE — 99284 EMERGENCY DEPT VISIT MOD MDM: CPT

## 2019-01-01 PROCEDURE — 80048 BASIC METABOLIC PNL TOTAL CA: CPT | Performed by: INTERNAL MEDICINE

## 2019-01-01 PROCEDURE — 84134 ASSAY OF PREALBUMIN: CPT | Performed by: INTERNAL MEDICINE

## 2019-01-01 PROCEDURE — 83036 HEMOGLOBIN GLYCOSYLATED A1C: CPT | Performed by: INTERNAL MEDICINE

## 2019-01-01 PROCEDURE — 85007 BL SMEAR W/DIFF WBC COUNT: CPT | Performed by: NURSE PRACTITIONER

## 2019-01-01 PROCEDURE — 86900 BLOOD TYPING SEROLOGIC ABO: CPT | Performed by: FAMILY MEDICINE

## 2019-01-01 PROCEDURE — 93005 ELECTROCARDIOGRAM TRACING: CPT

## 2019-01-01 PROCEDURE — 36415 COLL VENOUS BLD VENIPUNCTURE: CPT | Performed by: INTERNAL MEDICINE

## 2019-01-01 PROCEDURE — 93010 ELECTROCARDIOGRAM REPORT: CPT | Performed by: INTERNAL MEDICINE

## 2019-01-01 PROCEDURE — 25010000002 THIAMINE PER 100 MG: Performed by: FAMILY MEDICINE

## 2019-01-01 PROCEDURE — 81001 URINALYSIS AUTO W/SCOPE: CPT | Performed by: PHYSICIAN ASSISTANT

## 2019-01-01 PROCEDURE — 84484 ASSAY OF TROPONIN QUANT: CPT | Performed by: FAMILY MEDICINE

## 2019-01-01 PROCEDURE — 71045 X-RAY EXAM CHEST 1 VIEW: CPT

## 2019-01-01 PROCEDURE — 84443 ASSAY THYROID STIM HORMONE: CPT | Performed by: EMERGENCY MEDICINE

## 2019-01-01 PROCEDURE — 83735 ASSAY OF MAGNESIUM: CPT | Performed by: FAMILY MEDICINE

## 2019-01-01 PROCEDURE — 84443 ASSAY THYROID STIM HORMONE: CPT | Performed by: FAMILY MEDICINE

## 2019-01-01 PROCEDURE — 83605 ASSAY OF LACTIC ACID: CPT | Performed by: EMERGENCY MEDICINE

## 2019-01-01 PROCEDURE — 97110 THERAPEUTIC EXERCISES: CPT

## 2019-01-01 PROCEDURE — 85730 THROMBOPLASTIN TIME PARTIAL: CPT | Performed by: PHYSICIAN ASSISTANT

## 2019-01-01 PROCEDURE — 97167 OT EVAL HIGH COMPLEX 60 MIN: CPT

## 2019-01-01 PROCEDURE — 63710000001 DIPHENHYDRAMINE PER 50 MG: Performed by: INTERNAL MEDICINE

## 2019-01-01 PROCEDURE — 94760 N-INVAS EAR/PLS OXIMETRY 1: CPT

## 2019-01-01 PROCEDURE — 93350 STRESS TTE ONLY: CPT | Performed by: INTERNAL MEDICINE

## 2019-01-01 PROCEDURE — 80061 LIPID PANEL: CPT | Performed by: INTERNAL MEDICINE

## 2019-01-01 PROCEDURE — 36415 COLL VENOUS BLD VENIPUNCTURE: CPT | Performed by: FAMILY MEDICINE

## 2019-01-01 PROCEDURE — 73660 X-RAY EXAM OF TOE(S): CPT

## 2019-01-01 PROCEDURE — 93970 EXTREMITY STUDY: CPT | Performed by: SURGERY

## 2019-01-01 PROCEDURE — 25010000002 DIPHENHYDRAMINE PER 50 MG: Performed by: FAMILY MEDICINE

## 2019-01-01 PROCEDURE — 71250 CT THORAX DX C-: CPT

## 2019-01-01 PROCEDURE — 93350 STRESS TTE ONLY: CPT

## 2019-01-01 PROCEDURE — 25010000002 CEFTRIAXONE PER 250 MG: Performed by: EMERGENCY MEDICINE

## 2019-01-01 PROCEDURE — 92526 ORAL FUNCTION THERAPY: CPT

## 2019-01-01 PROCEDURE — 80053 COMPREHEN METABOLIC PANEL: CPT | Performed by: INTERNAL MEDICINE

## 2019-01-01 PROCEDURE — 97162 PT EVAL MOD COMPLEX 30 MIN: CPT

## 2019-01-01 PROCEDURE — 86900 BLOOD TYPING SEROLOGIC ABO: CPT

## 2019-01-01 PROCEDURE — 36415 COLL VENOUS BLD VENIPUNCTURE: CPT | Performed by: NURSE PRACTITIONER

## 2019-01-01 PROCEDURE — 85379 FIBRIN DEGRADATION QUANT: CPT | Performed by: EMERGENCY MEDICINE

## 2019-01-01 PROCEDURE — 80053 COMPREHEN METABOLIC PANEL: CPT | Performed by: FAMILY MEDICINE

## 2019-01-01 PROCEDURE — 93970 EXTREMITY STUDY: CPT

## 2019-01-01 PROCEDURE — 82607 VITAMIN B-12: CPT | Performed by: INTERNAL MEDICINE

## 2019-01-01 PROCEDURE — P9016 RBC LEUKOCYTES REDUCED: HCPCS

## 2019-01-01 PROCEDURE — 85025 COMPLETE CBC W/AUTO DIFF WBC: CPT | Performed by: INTERNAL MEDICINE

## 2019-01-01 PROCEDURE — 87186 SC STD MICRODIL/AGAR DIL: CPT | Performed by: NURSE PRACTITIONER

## 2019-01-01 PROCEDURE — 92611 MOTION FLUOROSCOPY/SWALLOW: CPT

## 2019-01-01 PROCEDURE — 81003 URINALYSIS AUTO W/O SCOPE: CPT | Performed by: FAMILY MEDICINE

## 2019-01-01 PROCEDURE — 82746 ASSAY OF FOLIC ACID SERUM: CPT | Performed by: FAMILY MEDICINE

## 2019-01-01 PROCEDURE — 36415 COLL VENOUS BLD VENIPUNCTURE: CPT

## 2019-01-01 PROCEDURE — 25010000002 POTASSIUM CHLORIDE PER 2 MEQ OF POTASSIUM: Performed by: FAMILY MEDICINE

## 2019-01-01 PROCEDURE — 25010000002 CEFTRIAXONE PER 250 MG: Performed by: NURSE PRACTITIONER

## 2019-01-01 PROCEDURE — 70450 CT HEAD/BRAIN W/O DYE: CPT

## 2019-01-01 PROCEDURE — 84443 ASSAY THYROID STIM HORMONE: CPT | Performed by: INTERNAL MEDICINE

## 2019-01-01 PROCEDURE — 82272 OCCULT BLD FECES 1-3 TESTS: CPT | Performed by: FAMILY MEDICINE

## 2019-01-01 PROCEDURE — 84134 ASSAY OF PREALBUMIN: CPT | Performed by: NURSE PRACTITIONER

## 2019-01-01 PROCEDURE — 84484 ASSAY OF TROPONIN QUANT: CPT | Performed by: PHYSICIAN ASSISTANT

## 2019-01-01 PROCEDURE — 93018 CV STRESS TEST I&R ONLY: CPT | Performed by: INTERNAL MEDICINE

## 2019-01-01 PROCEDURE — 85379 FIBRIN DEGRADATION QUANT: CPT | Performed by: PHYSICIAN ASSISTANT

## 2019-01-01 PROCEDURE — 92526 ORAL FUNCTION THERAPY: CPT | Performed by: SPEECH-LANGUAGE PATHOLOGIST

## 2019-01-01 PROCEDURE — G0378 HOSPITAL OBSERVATION PER HR: HCPCS

## 2019-01-01 PROCEDURE — 86901 BLOOD TYPING SEROLOGIC RH(D): CPT | Performed by: FAMILY MEDICINE

## 2019-01-01 PROCEDURE — 74177 CT ABD & PELVIS W/CONTRAST: CPT

## 2019-01-01 PROCEDURE — G0463 HOSPITAL OUTPT CLINIC VISIT: HCPCS

## 2019-01-01 PROCEDURE — 93306 TTE W/DOPPLER COMPLETE: CPT | Performed by: INTERNAL MEDICINE

## 2019-01-01 PROCEDURE — 99285 EMERGENCY DEPT VISIT HI MDM: CPT

## 2019-01-01 PROCEDURE — 83550 IRON BINDING TEST: CPT | Performed by: FAMILY MEDICINE

## 2019-01-01 PROCEDURE — 85025 COMPLETE CBC W/AUTO DIFF WBC: CPT | Performed by: NURSE PRACTITIONER

## 2019-01-01 PROCEDURE — 63710000001 PREDNISONE PER 1 MG: Performed by: NURSE PRACTITIONER

## 2019-01-01 PROCEDURE — 97166 OT EVAL MOD COMPLEX 45 MIN: CPT | Performed by: OCCUPATIONAL THERAPIST

## 2019-01-01 PROCEDURE — 80053 COMPREHEN METABOLIC PANEL: CPT | Performed by: EMERGENCY MEDICINE

## 2019-01-01 PROCEDURE — 36430 TRANSFUSION BLD/BLD COMPNT: CPT

## 2019-01-01 PROCEDURE — 76705 ECHO EXAM OF ABDOMEN: CPT

## 2019-01-01 PROCEDURE — 99232 SBSQ HOSP IP/OBS MODERATE 35: CPT | Performed by: INTERNAL MEDICINE

## 2019-01-01 PROCEDURE — 87086 URINE CULTURE/COLONY COUNT: CPT | Performed by: NURSE PRACTITIONER

## 2019-01-01 PROCEDURE — 80061 LIPID PANEL: CPT | Performed by: FAMILY MEDICINE

## 2019-01-01 PROCEDURE — 83540 ASSAY OF IRON: CPT | Performed by: FAMILY MEDICINE

## 2019-01-01 PROCEDURE — 83880 ASSAY OF NATRIURETIC PEPTIDE: CPT | Performed by: PHYSICIAN ASSISTANT

## 2019-01-01 PROCEDURE — 25010000002 PERFLUTREN 6.52 MG/ML SUSPENSION: Performed by: INTERNAL MEDICINE

## 2019-01-01 PROCEDURE — 86920 COMPATIBILITY TEST SPIN: CPT

## 2019-01-01 PROCEDURE — 84439 ASSAY OF FREE THYROXINE: CPT | Performed by: FAMILY MEDICINE

## 2019-01-01 PROCEDURE — 83880 ASSAY OF NATRIURETIC PEPTIDE: CPT | Performed by: FAMILY MEDICINE

## 2019-01-01 PROCEDURE — 25010000002 FUROSEMIDE PER 20 MG: Performed by: FAMILY MEDICINE

## 2019-01-01 PROCEDURE — 83880 ASSAY OF NATRIURETIC PEPTIDE: CPT | Performed by: EMERGENCY MEDICINE

## 2019-01-01 PROCEDURE — 25010000002 AZITHROMYCIN PER 500 MG: Performed by: NURSE PRACTITIONER

## 2019-01-01 PROCEDURE — 87899 AGENT NOS ASSAY W/OPTIC: CPT | Performed by: NURSE PRACTITIONER

## 2019-01-01 PROCEDURE — 80053 COMPREHEN METABOLIC PANEL: CPT | Performed by: NURSE PRACTITIONER

## 2019-01-01 PROCEDURE — 77067 SCR MAMMO BI INCL CAD: CPT

## 2019-01-01 PROCEDURE — 25010000002 FUROSEMIDE PER 20 MG: Performed by: EMERGENCY MEDICINE

## 2019-01-01 PROCEDURE — 25010000003 DOBUTAMINE PER 250 MG: Performed by: INTERNAL MEDICINE

## 2019-01-01 PROCEDURE — 81001 URINALYSIS AUTO W/SCOPE: CPT | Performed by: NURSE PRACTITIONER

## 2019-01-01 PROCEDURE — 85045 AUTOMATED RETICULOCYTE COUNT: CPT | Performed by: FAMILY MEDICINE

## 2019-01-01 PROCEDURE — 80048 BASIC METABOLIC PNL TOTAL CA: CPT | Performed by: NURSE PRACTITIONER

## 2019-01-01 PROCEDURE — 83036 HEMOGLOBIN GLYCOSYLATED A1C: CPT | Performed by: FAMILY MEDICINE

## 2019-01-01 PROCEDURE — 93017 CV STRESS TEST TRACING ONLY: CPT

## 2019-01-01 PROCEDURE — 82728 ASSAY OF FERRITIN: CPT | Performed by: FAMILY MEDICINE

## 2019-01-01 PROCEDURE — 93306 TTE W/DOPPLER COMPLETE: CPT

## 2019-01-01 PROCEDURE — 84484 ASSAY OF TROPONIN QUANT: CPT | Performed by: EMERGENCY MEDICINE

## 2019-01-01 PROCEDURE — 99223 1ST HOSP IP/OBS HIGH 75: CPT | Performed by: INTERNAL MEDICINE

## 2019-01-01 PROCEDURE — 87088 URINE BACTERIA CULTURE: CPT | Performed by: NURSE PRACTITIONER

## 2019-01-01 PROCEDURE — 73030 X-RAY EXAM OF SHOULDER: CPT

## 2019-01-01 PROCEDURE — 82270 OCCULT BLOOD FECES: CPT | Performed by: FAMILY MEDICINE

## 2019-01-01 PROCEDURE — 85610 PROTHROMBIN TIME: CPT | Performed by: PHYSICIAN ASSISTANT

## 2019-01-01 PROCEDURE — 82962 GLUCOSE BLOOD TEST: CPT

## 2019-01-01 PROCEDURE — 93005 ELECTROCARDIOGRAM TRACING: CPT | Performed by: PHYSICIAN ASSISTANT

## 2019-01-01 PROCEDURE — 82565 ASSAY OF CREATININE: CPT

## 2019-01-01 PROCEDURE — 74230 X-RAY XM SWLNG FUNCJ C+: CPT

## 2019-01-01 PROCEDURE — 86850 RBC ANTIBODY SCREEN: CPT | Performed by: FAMILY MEDICINE

## 2019-01-01 PROCEDURE — 80053 COMPREHEN METABOLIC PANEL: CPT

## 2019-01-01 PROCEDURE — 92610 EVALUATE SWALLOWING FUNCTION: CPT | Performed by: SPEECH-LANGUAGE PATHOLOGIST

## 2019-01-01 PROCEDURE — 92610 EVALUATE SWALLOWING FUNCTION: CPT

## 2019-01-01 PROCEDURE — 25010000002 IOPAMIDOL 61 % SOLUTION: Performed by: NURSE PRACTITIONER

## 2019-01-01 PROCEDURE — 87040 BLOOD CULTURE FOR BACTERIA: CPT | Performed by: EMERGENCY MEDICINE

## 2019-01-01 PROCEDURE — 71046 X-RAY EXAM CHEST 2 VIEWS: CPT

## 2019-01-01 PROCEDURE — 93352 ADMIN ECG CONTRAST AGENT: CPT | Performed by: INTERNAL MEDICINE

## 2019-01-01 PROCEDURE — 84484 ASSAY OF TROPONIN QUANT: CPT

## 2019-01-01 PROCEDURE — 85025 COMPLETE CBC W/AUTO DIFF WBC: CPT

## 2019-01-01 RX ORDER — PREDNISONE 10 MG/1
TABLET ORAL
Qty: 9 TABLET | Refills: 0 | Status: SHIPPED | OUTPATIENT
Start: 2019-01-01 | End: 2019-01-01

## 2019-01-01 RX ORDER — POTASSIUM CHLORIDE 750 MG/1
20 CAPSULE, EXTENDED RELEASE ORAL 2 TIMES DAILY WITH MEALS
Status: DISCONTINUED | OUTPATIENT
Start: 2019-01-01 | End: 2019-01-01 | Stop reason: HOSPADM

## 2019-01-01 RX ORDER — TRAMADOL HYDROCHLORIDE 50 MG/1
50 TABLET ORAL EVERY 6 HOURS PRN
Qty: 12 TABLET | Refills: 0 | Status: SHIPPED | OUTPATIENT
Start: 2019-01-01

## 2019-01-01 RX ORDER — DEXTROSE, SODIUM CHLORIDE, AND POTASSIUM CHLORIDE 5; .45; .15 G/100ML; G/100ML; G/100ML
60 INJECTION INTRAVENOUS CONTINUOUS
Status: DISCONTINUED | OUTPATIENT
Start: 2019-01-01 | End: 2019-01-01

## 2019-01-01 RX ORDER — LIDOCAINE 50 MG/G
2 PATCH TOPICAL
Status: DISCONTINUED | OUTPATIENT
Start: 2019-01-01 | End: 2019-01-01 | Stop reason: HOSPADM

## 2019-01-01 RX ORDER — CITALOPRAM 10 MG/1
10 TABLET ORAL DAILY
Status: DISCONTINUED | OUTPATIENT
Start: 2019-01-01 | End: 2019-01-01 | Stop reason: HOSPADM

## 2019-01-01 RX ORDER — DONEPEZIL HYDROCHLORIDE 10 MG/1
10 TABLET, FILM COATED ORAL NIGHTLY
Status: DISCONTINUED | OUTPATIENT
Start: 2019-01-01 | End: 2019-01-01 | Stop reason: HOSPADM

## 2019-01-01 RX ORDER — FAMOTIDINE 10 MG/ML
20 INJECTION, SOLUTION INTRAVENOUS 2 TIMES DAILY
Status: DISCONTINUED | OUTPATIENT
Start: 2019-01-01 | End: 2019-01-01

## 2019-01-01 RX ORDER — ASPIRIN 81 MG/1
81 TABLET ORAL DAILY
Qty: 14 TABLET | Refills: 0 | Status: SHIPPED | OUTPATIENT
Start: 2019-01-01

## 2019-01-01 RX ORDER — MULTIPLE VITAMINS W/ MINERALS TAB 9MG-400MCG
1 TAB ORAL DAILY
COMMUNITY

## 2019-01-01 RX ORDER — ONDANSETRON 2 MG/ML
4 INJECTION INTRAMUSCULAR; INTRAVENOUS EVERY 6 HOURS PRN
Status: DISCONTINUED | OUTPATIENT
Start: 2019-01-01 | End: 2019-01-01 | Stop reason: HOSPADM

## 2019-01-01 RX ORDER — SODIUM CHLORIDE 0.9 % (FLUSH) 0.9 %
3-10 SYRINGE (ML) INJECTION AS NEEDED
Status: DISCONTINUED | OUTPATIENT
Start: 2019-01-01 | End: 2019-01-01 | Stop reason: HOSPADM

## 2019-01-01 RX ORDER — DIPHENHYDRAMINE HCL 25 MG
25 TABLET ORAL EVERY 6 HOURS PRN
COMMUNITY
Start: 2019-01-01 | End: 2019-01-01

## 2019-01-01 RX ORDER — TRAMADOL HYDROCHLORIDE 50 MG/1
50 TABLET ORAL EVERY 6 HOURS PRN
Status: DISCONTINUED | OUTPATIENT
Start: 2019-01-01 | End: 2019-01-01 | Stop reason: HOSPADM

## 2019-01-01 RX ORDER — SODIUM CHLORIDE 0.9 % (FLUSH) 0.9 %
3 SYRINGE (ML) INJECTION EVERY 12 HOURS SCHEDULED
Status: DISCONTINUED | OUTPATIENT
Start: 2019-01-01 | End: 2019-01-01 | Stop reason: HOSPADM

## 2019-01-01 RX ORDER — ASPIRIN 325 MG
325 TABLET ORAL ONCE
Status: COMPLETED | OUTPATIENT
Start: 2019-01-01 | End: 2019-01-01

## 2019-01-01 RX ORDER — ASPIRIN 81 MG/1
81 TABLET ORAL DAILY
Status: DISCONTINUED | OUTPATIENT
Start: 2019-01-01 | End: 2019-01-01 | Stop reason: HOSPADM

## 2019-01-01 RX ORDER — ASPIRIN 81 MG/1
324 TABLET, CHEWABLE ORAL ONCE
Status: COMPLETED | OUTPATIENT
Start: 2019-01-01 | End: 2019-01-01

## 2019-01-01 RX ORDER — FUROSEMIDE 10 MG/ML
20 INJECTION INTRAMUSCULAR; INTRAVENOUS ONCE
Status: COMPLETED | OUTPATIENT
Start: 2019-01-01 | End: 2019-01-01

## 2019-01-01 RX ORDER — BUMETANIDE 0.25 MG/ML
0.5 INJECTION INTRAMUSCULAR; INTRAVENOUS EVERY 12 HOURS
Status: DISCONTINUED | OUTPATIENT
Start: 2019-01-01 | End: 2019-01-01

## 2019-01-01 RX ORDER — PREDNISONE 20 MG/1
20 TABLET ORAL 2 TIMES DAILY WITH MEALS
Status: DISCONTINUED | OUTPATIENT
Start: 2019-01-01 | End: 2019-01-01 | Stop reason: HOSPADM

## 2019-01-01 RX ORDER — BUPROPION HYDROCHLORIDE 150 MG/1
300 TABLET ORAL DAILY
Status: DISCONTINUED | OUTPATIENT
Start: 2019-01-01 | End: 2019-01-01 | Stop reason: HOSPADM

## 2019-01-01 RX ORDER — DIPHENHYDRAMINE HYDROCHLORIDE 50 MG/ML
25 INJECTION INTRAMUSCULAR; INTRAVENOUS ONCE
Status: COMPLETED | OUTPATIENT
Start: 2019-01-01 | End: 2019-01-01

## 2019-01-01 RX ORDER — TRAMADOL HYDROCHLORIDE 50 MG/1
50 TABLET ORAL EVERY 6 HOURS PRN
Qty: 12 TABLET | Refills: 0 | Status: ON HOLD | OUTPATIENT
Start: 2019-01-01 | End: 2019-01-01 | Stop reason: SDUPTHER

## 2019-01-01 RX ORDER — SODIUM CHLORIDE 0.9 % (FLUSH) 0.9 %
10 SYRINGE (ML) INJECTION AS NEEDED
Status: DISCONTINUED | OUTPATIENT
Start: 2019-01-01 | End: 2019-01-01 | Stop reason: HOSPADM

## 2019-01-01 RX ORDER — DEXTROSE MONOHYDRATE 50 MG/ML
60 INJECTION, SOLUTION INTRAVENOUS CONTINUOUS
Status: DISCONTINUED | OUTPATIENT
Start: 2019-01-01 | End: 2019-01-01 | Stop reason: HOSPADM

## 2019-01-01 RX ORDER — MIRTAZAPINE 15 MG/1
15 TABLET, FILM COATED ORAL NIGHTLY
Status: COMPLETED | OUTPATIENT
Start: 2019-01-01 | End: 2019-01-01

## 2019-01-01 RX ORDER — CITALOPRAM 20 MG/1
20 TABLET ORAL DAILY
Status: DISCONTINUED | OUTPATIENT
Start: 2019-01-01 | End: 2019-01-01

## 2019-01-01 RX ORDER — ACETAMINOPHEN 325 MG/1
650 TABLET ORAL ONCE
Status: DISCONTINUED | OUTPATIENT
Start: 2019-01-01 | End: 2019-01-01

## 2019-01-01 RX ORDER — SPIRONOLACTONE 25 MG/1
25 TABLET ORAL DAILY
COMMUNITY

## 2019-01-01 RX ORDER — MIRTAZAPINE 7.5 MG/1
7.5 TABLET, FILM COATED ORAL NIGHTLY
Qty: 30 TABLET | Refills: 0 | Status: SHIPPED | OUTPATIENT
Start: 2019-01-01

## 2019-01-01 RX ORDER — ATORVASTATIN CALCIUM 10 MG/1
10 TABLET, FILM COATED ORAL DAILY
Status: DISCONTINUED | OUTPATIENT
Start: 2019-01-01 | End: 2019-01-01

## 2019-01-01 RX ORDER — MIRTAZAPINE 15 MG/1
15 TABLET, FILM COATED ORAL NIGHTLY
COMMUNITY
Start: 2019-01-01 | End: 2019-01-01 | Stop reason: HOSPADM

## 2019-01-01 RX ORDER — CEFTRIAXONE 1 G/1
1 INJECTION, POWDER, FOR SOLUTION INTRAMUSCULAR; INTRAVENOUS DAILY
COMMUNITY
Start: 2019-01-01 | End: 2019-01-01 | Stop reason: HOSPADM

## 2019-01-01 RX ORDER — AZITHROMYCIN 250 MG/1
500 TABLET, FILM COATED ORAL ONCE
Status: COMPLETED | OUTPATIENT
Start: 2019-01-01 | End: 2019-01-01

## 2019-01-01 RX ORDER — TRAMADOL HYDROCHLORIDE 50 MG/1
25 TABLET ORAL EVERY 6 HOURS PRN
Status: DISCONTINUED | OUTPATIENT
Start: 2019-01-01 | End: 2019-01-01 | Stop reason: HOSPADM

## 2019-01-01 RX ORDER — CYPROHEPTADINE HYDROCHLORIDE 4 MG/1
2 TABLET ORAL 4 TIMES DAILY
COMMUNITY
Start: 2019-01-01 | End: 2019-01-01

## 2019-01-01 RX ORDER — FAMOTIDINE 20 MG/1
20 TABLET, FILM COATED ORAL
Status: DISCONTINUED | OUTPATIENT
Start: 2019-01-01 | End: 2019-01-01 | Stop reason: HOSPADM

## 2019-01-01 RX ORDER — AMOXICILLIN AND CLAVULANATE POTASSIUM 875; 125 MG/1; MG/1
1 TABLET, FILM COATED ORAL EVERY 12 HOURS SCHEDULED
Qty: 5 TABLET | Refills: 0 | Status: SHIPPED | OUTPATIENT
Start: 2019-01-01 | End: 2019-01-01

## 2019-01-01 RX ORDER — CYPROHEPTADINE HYDROCHLORIDE 4 MG/1
4 TABLET ORAL 4 TIMES DAILY
COMMUNITY
Start: 2019-01-01

## 2019-01-01 RX ORDER — DIPHENHYDRAMINE HYDROCHLORIDE 50 MG/ML
25 INJECTION INTRAMUSCULAR; INTRAVENOUS ONCE
Status: DISCONTINUED | OUTPATIENT
Start: 2019-01-01 | End: 2019-01-01

## 2019-01-01 RX ORDER — BUMETANIDE 1 MG/1
1 TABLET ORAL
Status: DISCONTINUED | OUTPATIENT
Start: 2019-01-01 | End: 2019-01-01 | Stop reason: HOSPADM

## 2019-01-01 RX ORDER — BUPROPION HYDROCHLORIDE 150 MG/1
150 TABLET ORAL DAILY
COMMUNITY

## 2019-01-01 RX ORDER — DIPHENHYDRAMINE HCL 25 MG
25 CAPSULE ORAL EVERY 6 HOURS PRN
Status: DISCONTINUED | OUTPATIENT
Start: 2019-01-01 | End: 2019-01-01 | Stop reason: HOSPADM

## 2019-01-01 RX ORDER — BUMETANIDE 0.25 MG/ML
1 INJECTION INTRAMUSCULAR; INTRAVENOUS EVERY 12 HOURS
Status: DISCONTINUED | OUTPATIENT
Start: 2019-01-01 | End: 2019-01-01

## 2019-01-01 RX ORDER — PANTOPRAZOLE SODIUM 40 MG/1
40 TABLET, DELAYED RELEASE ORAL DAILY
COMMUNITY

## 2019-01-01 RX ORDER — IPRATROPIUM BROMIDE AND ALBUTEROL SULFATE 2.5; .5 MG/3ML; MG/3ML
3 SOLUTION RESPIRATORY (INHALATION) EVERY 6 HOURS PRN
Status: DISCONTINUED | OUTPATIENT
Start: 2019-01-01 | End: 2019-01-01 | Stop reason: HOSPADM

## 2019-01-01 RX ORDER — PANTOPRAZOLE SODIUM 40 MG/1
40 TABLET, DELAYED RELEASE ORAL
Status: DISCONTINUED | OUTPATIENT
Start: 2019-01-01 | End: 2019-01-01 | Stop reason: HOSPADM

## 2019-01-01 RX ORDER — SACCHAROMYCES BOULARDII 250 MG
250 CAPSULE ORAL 2 TIMES DAILY
Status: DISCONTINUED | OUTPATIENT
Start: 2019-01-01 | End: 2019-01-01 | Stop reason: HOSPADM

## 2019-01-01 RX ORDER — CEFDINIR 300 MG/1
300 CAPSULE ORAL EVERY 12 HOURS SCHEDULED
Status: DISCONTINUED | OUTPATIENT
Start: 2019-01-01 | End: 2019-01-01

## 2019-01-01 RX ORDER — ACETAMINOPHEN 325 MG/1
650 TABLET ORAL EVERY 4 HOURS PRN
Status: DISCONTINUED | OUTPATIENT
Start: 2019-01-01 | End: 2019-01-01 | Stop reason: HOSPADM

## 2019-01-01 RX ORDER — DOBUTAMINE HYDROCHLORIDE 100 MG/100ML
10 INJECTION INTRAVENOUS
Status: DISCONTINUED | OUTPATIENT
Start: 2019-01-01 | End: 2019-01-01 | Stop reason: HOSPADM

## 2019-01-01 RX ORDER — ACETAMINOPHEN 325 MG/1
650 TABLET ORAL ONCE
Status: COMPLETED | OUTPATIENT
Start: 2019-01-01 | End: 2019-01-01

## 2019-01-01 RX ORDER — BUMETANIDE 1 MG/1
1 TABLET ORAL 2 TIMES DAILY
Qty: 30 TABLET | Refills: 0 | Status: SHIPPED | OUTPATIENT
Start: 2019-01-01

## 2019-01-01 RX ORDER — PANTOPRAZOLE SODIUM 40 MG/1
40 TABLET, DELAYED RELEASE ORAL 2 TIMES DAILY
Status: DISCONTINUED | OUTPATIENT
Start: 2019-01-01 | End: 2019-01-01

## 2019-01-01 RX ORDER — SODIUM CHLORIDE 9 MG/ML
75 INJECTION, SOLUTION INTRAVENOUS CONTINUOUS
Status: DISCONTINUED | OUTPATIENT
Start: 2019-01-01 | End: 2019-01-01

## 2019-01-01 RX ORDER — FUROSEMIDE 10 MG/ML
20 INJECTION INTRAMUSCULAR; INTRAVENOUS EVERY 12 HOURS
Status: DISCONTINUED | OUTPATIENT
Start: 2019-01-01 | End: 2019-01-01

## 2019-01-01 RX ORDER — POTASSIUM CHLORIDE 750 MG/1
40 CAPSULE, EXTENDED RELEASE ORAL ONCE
Status: DISCONTINUED | OUTPATIENT
Start: 2019-01-01 | End: 2019-01-01

## 2019-01-01 RX ORDER — PANTOPRAZOLE SODIUM 40 MG/1
40 TABLET, DELAYED RELEASE ORAL DAILY
Status: DISCONTINUED | OUTPATIENT
Start: 2019-01-01 | End: 2019-01-01

## 2019-01-01 RX ORDER — POTASSIUM CHLORIDE 750 MG/1
40 CAPSULE, EXTENDED RELEASE ORAL 2 TIMES DAILY
Status: COMPLETED | OUTPATIENT
Start: 2019-01-01 | End: 2019-01-01

## 2019-01-01 RX ORDER — AMOXICILLIN AND CLAVULANATE POTASSIUM 875; 125 MG/1; MG/1
1 TABLET, FILM COATED ORAL EVERY 12 HOURS SCHEDULED
Status: DISCONTINUED | OUTPATIENT
Start: 2019-01-01 | End: 2019-01-01 | Stop reason: HOSPADM

## 2019-01-01 RX ADMIN — LIDOCAINE 2 PATCH: 50 PATCH CUTANEOUS at 08:07

## 2019-01-01 RX ADMIN — ATORVASTATIN CALCIUM 10 MG: 10 TABLET, FILM COATED ORAL at 09:20

## 2019-01-01 RX ADMIN — SODIUM CHLORIDE 500 ML: 9 INJECTION, SOLUTION INTRAVENOUS at 18:00

## 2019-01-01 RX ADMIN — ASPIRIN 325 MG: 325 TABLET, COATED ORAL at 20:28

## 2019-01-01 RX ADMIN — FUROSEMIDE 20 MG: 10 INJECTION, SOLUTION INTRAMUSCULAR; INTRAVENOUS at 17:51

## 2019-01-01 RX ADMIN — PREDNISONE 20 MG: 20 TABLET ORAL at 07:56

## 2019-01-01 RX ADMIN — RIVAROXABAN 20 MG: 20 TABLET, FILM COATED ORAL at 17:20

## 2019-01-01 RX ADMIN — FAMOTIDINE 20 MG: 10 INJECTION INTRAVENOUS at 21:46

## 2019-01-01 RX ADMIN — RIVAROXABAN 20 MG: 20 TABLET, FILM COATED ORAL at 09:20

## 2019-01-01 RX ADMIN — BUPROPION HYDROCHLORIDE 300 MG: 150 TABLET, FILM COATED, EXTENDED RELEASE ORAL at 08:26

## 2019-01-01 RX ADMIN — FAMOTIDINE 20 MG: 10 INJECTION INTRAVENOUS at 22:06

## 2019-01-01 RX ADMIN — Medication 250 MG: at 07:57

## 2019-01-01 RX ADMIN — ASPIRIN 81 MG: 81 TABLET ORAL at 08:10

## 2019-01-01 RX ADMIN — BUPROPION HYDROCHLORIDE 300 MG: 150 TABLET, FILM COATED, EXTENDED RELEASE ORAL at 08:08

## 2019-01-01 RX ADMIN — Medication 250 MG: at 08:08

## 2019-01-01 RX ADMIN — IOPAMIDOL 100 ML: 612 INJECTION, SOLUTION INTRAVENOUS at 10:40

## 2019-01-01 RX ADMIN — RIVAROXABAN 20 MG: 20 TABLET, FILM COATED ORAL at 08:26

## 2019-01-01 RX ADMIN — DIPHENHYDRAMINE HYDROCHLORIDE 25 MG: 50 INJECTION INTRAMUSCULAR; INTRAVENOUS at 22:02

## 2019-01-01 RX ADMIN — DEXTROSE MONOHYDRATE 60 ML/HR: 50 INJECTION, SOLUTION INTRAVENOUS at 19:53

## 2019-01-01 RX ADMIN — TRAMADOL HYDROCHLORIDE 25 MG: 50 TABLET ORAL at 03:31

## 2019-01-01 RX ADMIN — CITALOPRAM 10 MG: 10 TABLET, FILM COATED ORAL at 08:08

## 2019-01-01 RX ADMIN — BUMETANIDE 0.5 MG: 0.25 INJECTION INTRAMUSCULAR; INTRAVENOUS at 07:11

## 2019-01-01 RX ADMIN — SODIUM CHLORIDE, PRESERVATIVE FREE 3 ML: 5 INJECTION INTRAVENOUS at 08:07

## 2019-01-01 RX ADMIN — SODIUM CHLORIDE, PRESERVATIVE FREE 3 ML: 5 INJECTION INTRAVENOUS at 08:34

## 2019-01-01 RX ADMIN — CITALOPRAM 10 MG: 10 TABLET, FILM COATED ORAL at 08:04

## 2019-01-01 RX ADMIN — RIVAROXABAN 20 MG: 20 TABLET, FILM COATED ORAL at 07:57

## 2019-01-01 RX ADMIN — BUPROPION HYDROCHLORIDE 300 MG: 150 TABLET, FILM COATED, EXTENDED RELEASE ORAL at 08:14

## 2019-01-01 RX ADMIN — DONEPEZIL HYDROCHLORIDE 10 MG: 10 TABLET, FILM COATED ORAL at 20:24

## 2019-01-01 RX ADMIN — PANTOPRAZOLE SODIUM 40 MG: 40 TABLET, DELAYED RELEASE ORAL at 09:08

## 2019-01-01 RX ADMIN — CITALOPRAM 10 MG: 10 TABLET, FILM COATED ORAL at 09:49

## 2019-01-01 RX ADMIN — TRAMADOL HYDROCHLORIDE 50 MG: 50 TABLET, COATED ORAL at 10:11

## 2019-01-01 RX ADMIN — FAMOTIDINE 20 MG: 20 TABLET, FILM COATED ORAL at 18:15

## 2019-01-01 RX ADMIN — TRAMADOL HYDROCHLORIDE 25 MG: 50 TABLET ORAL at 17:05

## 2019-01-01 RX ADMIN — ASPIRIN 81 MG: 81 TABLET ORAL at 08:31

## 2019-01-01 RX ADMIN — SODIUM CHLORIDE, PRESERVATIVE FREE 3 ML: 5 INJECTION INTRAVENOUS at 21:24

## 2019-01-01 RX ADMIN — BUMETANIDE 0.5 MG: 0.25 INJECTION INTRAMUSCULAR; INTRAVENOUS at 17:55

## 2019-01-01 RX ADMIN — PREDNISONE 20 MG: 20 TABLET ORAL at 17:37

## 2019-01-01 RX ADMIN — RIVAROXABAN 20 MG: 20 TABLET, FILM COATED ORAL at 08:08

## 2019-01-01 RX ADMIN — LIDOCAINE 2 PATCH: 50 PATCH CUTANEOUS at 07:56

## 2019-01-01 RX ADMIN — SODIUM CHLORIDE, PRESERVATIVE FREE 3 ML: 5 INJECTION INTRAVENOUS at 08:08

## 2019-01-01 RX ADMIN — PANTOPRAZOLE SODIUM 40 MG: 40 TABLET, DELAYED RELEASE ORAL at 21:47

## 2019-01-01 RX ADMIN — BUPROPION HYDROCHLORIDE 300 MG: 150 TABLET, FILM COATED, EXTENDED RELEASE ORAL at 08:31

## 2019-01-01 RX ADMIN — POTASSIUM CHLORIDE 20 MEQ: 750 CAPSULE, EXTENDED RELEASE ORAL at 08:07

## 2019-01-01 RX ADMIN — BUMETANIDE 1 MG: 1 TABLET ORAL at 17:31

## 2019-01-01 RX ADMIN — SODIUM CHLORIDE, PRESERVATIVE FREE 3 ML: 5 INJECTION INTRAVENOUS at 21:46

## 2019-01-01 RX ADMIN — POTASSIUM CHLORIDE 20 MEQ: 750 CAPSULE, EXTENDED RELEASE ORAL at 08:10

## 2019-01-01 RX ADMIN — ASPIRIN 81 MG: 81 TABLET ORAL at 08:06

## 2019-01-01 RX ADMIN — FAMOTIDINE 20 MG: 10 INJECTION INTRAVENOUS at 08:33

## 2019-01-01 RX ADMIN — PANTOPRAZOLE SODIUM 40 MG: 40 TABLET, DELAYED RELEASE ORAL at 06:35

## 2019-01-01 RX ADMIN — MIRTAZAPINE 15 MG: 15 TABLET, FILM COATED ORAL at 21:47

## 2019-01-01 RX ADMIN — CEFTRIAXONE SODIUM 1 G: 1 INJECTION, POWDER, FOR SOLUTION INTRAMUSCULAR; INTRAVENOUS at 08:46

## 2019-01-01 RX ADMIN — LIDOCAINE 2 PATCH: 50 PATCH CUTANEOUS at 08:03

## 2019-01-01 RX ADMIN — FUROSEMIDE 20 MG: 10 INJECTION, SOLUTION INTRAMUSCULAR; INTRAVENOUS at 06:06

## 2019-01-01 RX ADMIN — ASPIRIN 81 MG: 81 TABLET ORAL at 21:47

## 2019-01-01 RX ADMIN — SODIUM CHLORIDE 500 ML: 9 INJECTION, SOLUTION INTRAVENOUS at 12:50

## 2019-01-01 RX ADMIN — SODIUM CHLORIDE 1 G: 900 INJECTION INTRAVENOUS at 04:42

## 2019-01-01 RX ADMIN — SODIUM CHLORIDE, PRESERVATIVE FREE 3 ML: 5 INJECTION INTRAVENOUS at 08:32

## 2019-01-01 RX ADMIN — PANTOPRAZOLE SODIUM 40 MG: 40 TABLET, DELAYED RELEASE ORAL at 06:25

## 2019-01-01 RX ADMIN — CITALOPRAM 10 MG: 10 TABLET, FILM COATED ORAL at 08:06

## 2019-01-01 RX ADMIN — FAMOTIDINE 20 MG: 20 TABLET, FILM COATED ORAL at 08:06

## 2019-01-01 RX ADMIN — CEFDINIR 300 MG: 300 CAPSULE ORAL at 08:04

## 2019-01-01 RX ADMIN — DIPHENHYDRAMINE HYDROCHLORIDE 25 MG: 25 CAPSULE ORAL at 06:36

## 2019-01-01 RX ADMIN — RIVAROXABAN 20 MG: 20 TABLET, FILM COATED ORAL at 08:04

## 2019-01-01 RX ADMIN — Medication 10 MCG/KG/MIN: at 13:27

## 2019-01-01 RX ADMIN — BUMETANIDE 1 MG: 0.25 INJECTION INTRAMUSCULAR; INTRAVENOUS at 04:25

## 2019-01-01 RX ADMIN — TRAMADOL HYDROCHLORIDE 25 MG: 50 TABLET ORAL at 10:14

## 2019-01-01 RX ADMIN — BUPROPION HYDROCHLORIDE 300 MG: 150 TABLET, FILM COATED, EXTENDED RELEASE ORAL at 07:56

## 2019-01-01 RX ADMIN — ASPIRIN 81 MG: 81 TABLET ORAL at 09:08

## 2019-01-01 RX ADMIN — AZITHROMYCIN MONOHYDRATE 500 MG: 500 INJECTION, POWDER, LYOPHILIZED, FOR SOLUTION INTRAVENOUS at 12:37

## 2019-01-01 RX ADMIN — FAMOTIDINE 20 MG: 20 TABLET, FILM COATED ORAL at 17:55

## 2019-01-01 RX ADMIN — RIVAROXABAN 20 MG: 20 TABLET, FILM COATED ORAL at 17:51

## 2019-01-01 RX ADMIN — SODIUM CHLORIDE, PRESERVATIVE FREE 3 ML: 5 INJECTION INTRAVENOUS at 21:28

## 2019-01-01 RX ADMIN — PERFLUTREN 8.48 MG: 6.52 INJECTION, SUSPENSION INTRAVENOUS at 13:26

## 2019-01-01 RX ADMIN — TRAMADOL HYDROCHLORIDE 25 MG: 50 TABLET ORAL at 16:37

## 2019-01-01 RX ADMIN — ASPIRIN 81 MG: 81 TABLET ORAL at 08:07

## 2019-01-01 RX ADMIN — SODIUM CHLORIDE, PRESERVATIVE FREE 3 ML: 5 INJECTION INTRAVENOUS at 21:39

## 2019-01-01 RX ADMIN — BUPROPION HYDROCHLORIDE 300 MG: 150 TABLET, FILM COATED, EXTENDED RELEASE ORAL at 08:06

## 2019-01-01 RX ADMIN — SODIUM CHLORIDE, PRESERVATIVE FREE 3 ML: 5 INJECTION INTRAVENOUS at 08:27

## 2019-01-01 RX ADMIN — RIVAROXABAN 20 MG: 20 TABLET, FILM COATED ORAL at 09:49

## 2019-01-01 RX ADMIN — AMOXICILLIN AND CLAVULANATE POTASSIUM 1 TABLET: 875; 125 TABLET, FILM COATED ORAL at 15:00

## 2019-01-01 RX ADMIN — BUPROPION HYDROCHLORIDE 300 MG: 150 TABLET, FILM COATED, EXTENDED RELEASE ORAL at 08:18

## 2019-01-01 RX ADMIN — SODIUM CHLORIDE, PRESERVATIVE FREE 3 ML: 5 INJECTION INTRAVENOUS at 22:02

## 2019-01-01 RX ADMIN — AZITHROMYCIN MONOHYDRATE 500 MG: 500 INJECTION, POWDER, LYOPHILIZED, FOR SOLUTION INTRAVENOUS at 12:35

## 2019-01-01 RX ADMIN — AZITHROMYCIN 500 MG: 250 TABLET, FILM COATED ORAL at 13:17

## 2019-01-01 RX ADMIN — POTASSIUM CHLORIDE 20 MEQ: 750 CAPSULE, EXTENDED RELEASE ORAL at 17:55

## 2019-01-01 RX ADMIN — RIVAROXABAN 20 MG: 20 TABLET, FILM COATED ORAL at 17:31

## 2019-01-01 RX ADMIN — FAMOTIDINE 20 MG: 20 TABLET, FILM COATED ORAL at 20:31

## 2019-01-01 RX ADMIN — TRAMADOL HYDROCHLORIDE 25 MG: 50 TABLET ORAL at 20:31

## 2019-01-01 RX ADMIN — FOLIC ACID 60 ML/HR: 5 INJECTION, SOLUTION INTRAMUSCULAR; INTRAVENOUS; SUBCUTANEOUS at 13:48

## 2019-01-01 RX ADMIN — SODIUM CHLORIDE, PRESERVATIVE FREE 3 ML: 5 INJECTION INTRAVENOUS at 08:19

## 2019-01-01 RX ADMIN — PREDNISONE 20 MG: 20 TABLET ORAL at 18:16

## 2019-01-01 RX ADMIN — CEFDINIR 300 MG: 300 CAPSULE ORAL at 21:28

## 2019-01-01 RX ADMIN — DONEPEZIL HYDROCHLORIDE 10 MG: 10 TABLET, FILM COATED ORAL at 20:44

## 2019-01-01 RX ADMIN — RIVAROXABAN 20 MG: 20 TABLET, FILM COATED ORAL at 18:15

## 2019-01-01 RX ADMIN — SODIUM CHLORIDE, PRESERVATIVE FREE 3 ML: 5 INJECTION INTRAVENOUS at 20:32

## 2019-01-01 RX ADMIN — FUROSEMIDE 20 MG: 10 INJECTION, SOLUTION INTRAVENOUS at 20:27

## 2019-01-01 RX ADMIN — TRAMADOL HYDROCHLORIDE 25 MG: 50 TABLET ORAL at 02:08

## 2019-01-01 RX ADMIN — FUROSEMIDE 20 MG: 10 INJECTION, SOLUTION INTRAMUSCULAR; INTRAVENOUS at 17:20

## 2019-01-01 RX ADMIN — CITALOPRAM 10 MG: 10 TABLET, FILM COATED ORAL at 08:26

## 2019-01-01 RX ADMIN — CITALOPRAM 20 MG: 20 TABLET, FILM COATED ORAL at 09:20

## 2019-01-01 RX ADMIN — FAMOTIDINE 20 MG: 20 TABLET, FILM COATED ORAL at 08:10

## 2019-01-01 RX ADMIN — PREDNISONE 20 MG: 20 TABLET ORAL at 08:08

## 2019-01-01 RX ADMIN — FAMOTIDINE 20 MG: 20 TABLET, FILM COATED ORAL at 17:31

## 2019-01-01 RX ADMIN — CITALOPRAM 10 MG: 10 TABLET, FILM COATED ORAL at 07:57

## 2019-01-01 RX ADMIN — LIDOCAINE 2 PATCH: 50 PATCH CUTANEOUS at 13:17

## 2019-01-01 RX ADMIN — PANTOPRAZOLE SODIUM 40 MG: 40 TABLET, DELAYED RELEASE ORAL at 06:56

## 2019-01-01 RX ADMIN — AMOXICILLIN AND CLAVULANATE POTASSIUM 1 TABLET: 875; 125 TABLET, FILM COATED ORAL at 21:39

## 2019-01-01 RX ADMIN — BUMETANIDE 1 MG: 0.25 INJECTION INTRAMUSCULAR; INTRAVENOUS at 05:38

## 2019-01-01 RX ADMIN — Medication 250 MG: at 21:39

## 2019-01-01 RX ADMIN — AMOXICILLIN AND CLAVULANATE POTASSIUM 1 TABLET: 875; 125 TABLET, FILM COATED ORAL at 07:57

## 2019-01-01 RX ADMIN — SODIUM CHLORIDE 75 ML/HR: 9 INJECTION, SOLUTION INTRAVENOUS at 09:26

## 2019-01-01 RX ADMIN — ASPIRIN 81 MG 324 MG: 81 TABLET ORAL at 07:04

## 2019-01-01 RX ADMIN — TRAMADOL HYDROCHLORIDE 50 MG: 50 TABLET, COATED ORAL at 18:38

## 2019-01-01 RX ADMIN — ASPIRIN 81 MG: 81 TABLET ORAL at 08:18

## 2019-01-01 RX ADMIN — SODIUM CHLORIDE, PRESERVATIVE FREE 3 ML: 5 INJECTION INTRAVENOUS at 08:03

## 2019-01-01 RX ADMIN — POTASSIUM CHLORIDE 40 MEQ: 10 CAPSULE, COATED, EXTENDED RELEASE ORAL at 11:11

## 2019-01-01 RX ADMIN — BUPROPION HYDROCHLORIDE 300 MG: 150 TABLET, FILM COATED, EXTENDED RELEASE ORAL at 09:48

## 2019-01-01 RX ADMIN — PREDNISONE 20 MG: 20 TABLET ORAL at 17:22

## 2019-01-01 RX ADMIN — BUMETANIDE 1 MG: 0.25 INJECTION INTRAMUSCULAR; INTRAVENOUS at 18:15

## 2019-01-01 RX ADMIN — RIVAROXABAN 20 MG: 20 TABLET, FILM COATED ORAL at 17:55

## 2019-01-01 RX ADMIN — SODIUM CHLORIDE, PRESERVATIVE FREE 3 ML: 5 INJECTION INTRAVENOUS at 08:11

## 2019-01-01 RX ADMIN — ASPIRIN 81 MG: 81 TABLET ORAL at 09:02

## 2019-01-01 RX ADMIN — FAMOTIDINE 20 MG: 10 INJECTION INTRAVENOUS at 08:18

## 2019-01-01 RX ADMIN — PANTOPRAZOLE SODIUM 40 MG: 40 TABLET, DELAYED RELEASE ORAL at 05:53

## 2019-01-01 RX ADMIN — TRAMADOL HYDROCHLORIDE 25 MG: 50 TABLET ORAL at 14:18

## 2019-01-01 RX ADMIN — TRAMADOL HYDROCHLORIDE 25 MG: 50 TABLET ORAL at 07:53

## 2019-01-01 RX ADMIN — FUROSEMIDE 20 MG: 10 INJECTION, SOLUTION INTRAMUSCULAR; INTRAVENOUS at 06:38

## 2019-01-01 RX ADMIN — POTASSIUM CHLORIDE 20 MEQ: 750 CAPSULE, EXTENDED RELEASE ORAL at 08:06

## 2019-01-01 RX ADMIN — FAMOTIDINE 20 MG: 20 TABLET, FILM COATED ORAL at 09:02

## 2019-01-01 RX ADMIN — BUPROPION HYDROCHLORIDE 300 MG: 150 TABLET, FILM COATED, EXTENDED RELEASE ORAL at 08:10

## 2019-01-01 RX ADMIN — FOLIC ACID 60 ML/HR: 5 INJECTION, SOLUTION INTRAMUSCULAR; INTRAVENOUS; SUBCUTANEOUS at 02:08

## 2019-01-01 RX ADMIN — SODIUM CHLORIDE, PRESERVATIVE FREE 3 ML: 5 INJECTION INTRAVENOUS at 22:09

## 2019-01-01 RX ADMIN — SODIUM CHLORIDE, PRESERVATIVE FREE 3 ML: 5 INJECTION INTRAVENOUS at 08:53

## 2019-01-01 RX ADMIN — BUPROPION HYDROCHLORIDE 300 MG: 150 TABLET, FILM COATED, EXTENDED RELEASE ORAL at 08:04

## 2019-01-01 RX ADMIN — POTASSIUM CHLORIDE 20 MEQ: 750 CAPSULE, EXTENDED RELEASE ORAL at 17:31

## 2019-01-01 RX ADMIN — BUPROPION HYDROCHLORIDE 300 MG: 150 TABLET, FILM COATED, EXTENDED RELEASE ORAL at 09:02

## 2019-01-01 RX ADMIN — BUPROPION HYDROCHLORIDE 300 MG: 150 TABLET, FILM COATED, EXTENDED RELEASE ORAL at 09:20

## 2019-01-01 RX ADMIN — POTASSIUM CHLORIDE 20 MEQ: 750 CAPSULE, EXTENDED RELEASE ORAL at 09:02

## 2019-01-01 RX ADMIN — Medication 250 MG: at 20:44

## 2019-01-01 RX ADMIN — AMOXICILLIN AND CLAVULANATE POTASSIUM 1 TABLET: 875; 125 TABLET, FILM COATED ORAL at 08:08

## 2019-01-01 RX ADMIN — TRAMADOL HYDROCHLORIDE 25 MG: 50 TABLET ORAL at 23:33

## 2019-01-01 RX ADMIN — CITALOPRAM 10 MG: 10 TABLET, FILM COATED ORAL at 08:10

## 2019-01-01 RX ADMIN — SODIUM CHLORIDE, PRESERVATIVE FREE 3 ML: 5 INJECTION INTRAVENOUS at 20:24

## 2019-01-01 RX ADMIN — POTASSIUM CHLORIDE 20 MEQ: 750 CAPSULE, EXTENDED RELEASE ORAL at 18:15

## 2019-01-01 RX ADMIN — BUMETANIDE 1 MG: 1 TABLET ORAL at 08:06

## 2019-01-01 RX ADMIN — TRAMADOL HYDROCHLORIDE 25 MG: 50 TABLET ORAL at 00:56

## 2019-01-01 RX ADMIN — TRAMADOL HYDROCHLORIDE 50 MG: 50 TABLET, COATED ORAL at 17:24

## 2019-01-01 RX ADMIN — ACETAMINOPHEN 650 MG: 325 TABLET, FILM COATED ORAL at 22:01

## 2019-01-01 RX ADMIN — ASPIRIN 81 MG: 81 TABLET ORAL at 08:32

## 2019-01-01 RX ADMIN — DONEPEZIL HYDROCHLORIDE 10 MG: 10 TABLET, FILM COATED ORAL at 21:39

## 2019-01-01 RX ADMIN — SODIUM CHLORIDE, PRESERVATIVE FREE 3 ML: 5 INJECTION INTRAVENOUS at 09:49

## 2019-01-01 RX ADMIN — SODIUM CHLORIDE, PRESERVATIVE FREE 3 ML: 5 INJECTION INTRAVENOUS at 09:08

## 2019-01-01 RX ADMIN — CITALOPRAM 10 MG: 10 TABLET, FILM COATED ORAL at 08:18

## 2019-01-01 RX ADMIN — POTASSIUM CHLORIDE 40 MEQ: 10 CAPSULE, COATED, EXTENDED RELEASE ORAL at 20:18

## 2019-01-01 RX ADMIN — DONEPEZIL HYDROCHLORIDE 10 MG: 10 TABLET, FILM COATED ORAL at 21:15

## 2019-01-01 RX ADMIN — SODIUM CHLORIDE, PRESERVATIVE FREE 3 ML: 5 INJECTION INTRAVENOUS at 22:18

## 2019-01-01 RX ADMIN — DONEPEZIL HYDROCHLORIDE 10 MG: 10 TABLET, FILM COATED ORAL at 21:28

## 2019-01-01 RX ADMIN — CITALOPRAM 10 MG: 10 TABLET, FILM COATED ORAL at 09:02

## 2019-01-01 RX ADMIN — PANTOPRAZOLE SODIUM 40 MG: 40 TABLET, DELAYED RELEASE ORAL at 12:35

## 2019-01-01 RX ADMIN — CITALOPRAM 10 MG: 10 TABLET, FILM COATED ORAL at 08:33

## 2019-01-01 RX ADMIN — AMOXICILLIN AND CLAVULANATE POTASSIUM 1 TABLET: 875; 125 TABLET, FILM COATED ORAL at 20:44

## 2019-02-01 NOTE — ED NOTES
PATIENT REPORTS THAT SHE HAS ALSO HAD DIFFICULTY WITH HER MEMORY OVER THE LAST WEEK. SAUL ROSADO NOTIFIED.     Gia Diaz RN  02/01/19 0884

## 2019-02-01 NOTE — ED NOTES
[facility]ESS [facility] STATES THEY MUST HAVE ANOTHER SET OF CARDIAC ENZYMES BEFORE STRESS. SAUL ROSADO NOTIFIED...STATES THAT JULY SPOKE WITH CARDIOLOGY AND THEY GAVE THE GO AHEAD FOR STRESS WITH THIS HEART SCORE. BRUNA IN STRESS [facility] NOTIFIED.     Gia Diaz, RN  02/01/19 4025

## 2019-02-01 NOTE — ED PROVIDER NOTES
Subjective   The patient states that she awoke with pain in her left upper arm and the posterior aspect of her left chest.  She states that it was acing at about 5/10 at worst.  She thought at first it was the way she was lying, but denied improvement when she moved.  She states that it did not get worse with deep breaths or with movement.  She denies recent fall or strenuous activity.  She denies shortness of breath, nausea, or diaphoresis.  The patient denies cardiac history.  She is a former smoker, but cannot remember if her family has any cardiac history.  She has high cholesterol.  The patient states that her pain has completely resolved since arrival to ED.  The patient also states that she has had trouble remembering things, but states that this has been a very slow process over the past several months.  She denies recently injury/hitting head.  She denies significant worsening.        History provided by:  Patient   used: No    Chest Pain   Pain location:  L chest  Pain quality: aching    Pain radiates to:  L shoulder and L arm  Pain severity:  Moderate  Onset quality:  Sudden  Duration:  4 hours  Timing:  Constant  Progression:  Resolved  Chronicity:  New  Context: not breathing, not drug use, not intercourse, not lifting, not raising an arm and not at rest    Relieved by:  Nothing  Worsened by:  Nothing  Ineffective treatments:  None tried  Associated symptoms: no abdominal pain, no altered mental status, no back pain, no claudication, no cough, no diaphoresis, no dizziness, no dysphagia, no fatigue, no headache, no nausea, no near-syncope, no numbness, no palpitations, no shortness of breath, no syncope and no vomiting        Review of Systems   Constitutional: Negative for diaphoresis and fatigue.   HENT: Negative for trouble swallowing.    Eyes: Negative.    Respiratory: Negative for cough and shortness of breath.    Cardiovascular: Positive for chest pain and leg swelling. Negative  for palpitations, claudication, syncope and near-syncope.   Gastrointestinal: Negative for abdominal pain, nausea and vomiting.   Musculoskeletal: Negative for back pain.   Skin: Negative.    Neurological: Negative for dizziness, numbness and headaches.   Psychiatric/Behavioral: Negative.        Past Medical History:   Diagnosis Date   • Arthritis    • Colon polyp    • DVT (deep venous thrombosis) (CMS/HCC)    • GERD (gastroesophageal reflux disease)    • Hypertension    • PE (pulmonary thromboembolism) (CMS/HCC)    • Sleep apnea        No Known Allergies    Past Surgical History:   Procedure Laterality Date   • CHOLECYSTECTOMY     • COLONOSCOPY  09/24/2013   • COLONOSCOPY N/A 8/10/2017    Procedure: COLONOSCOPY WITH ANESTHESIA;  Surgeon: Jenna Page MD;  Location: [facility] ENDOSCOPY;  Service:    • ENDOSCOPY N/A 8/10/2017    Procedure: ESOPHAGOGASTRODUODENOSCOPY WITH ANESTHESIA;  Surgeon: Jenna Page MD;  Location: [facility] ENDOSCOPY;  Service:    • JOINT REPLACEMENT         Family History   Problem Relation Age of Onset   • Breast cancer Neg Hx    • Colon cancer Neg Hx    • Colon polyps Neg Hx        Social History     Socioeconomic History   • Marital status: Single     Spouse name: Not on file   • Number of children: Not on file   • Years of education: Not on file   • Highest education level: Not on file   Tobacco Use   • Smoking status: Former Smoker   • Smokeless tobacco: Never Used   Substance and Sexual Activity   • Alcohol use: No   • Drug use: No           Objective   Physical Exam   Constitutional: She is oriented to person, place, and time. She appears well-developed and well-nourished. No distress.   HENT:   Head: Normocephalic and atraumatic.   Eyes: EOM are normal. Pupils are equal, round, and reactive to light.   Cardiovascular: Normal rate, regular rhythm and normal heart sounds. Exam reveals no friction rub.   No murmur heard.  Pulmonary/Chest: Effort normal and breath sounds normal. No stridor.  No respiratory distress. She has no wheezes.   Musculoskeletal: Normal range of motion. She exhibits edema. She exhibits no tenderness or deformity.   No pain to palpation to left arm or left posterior chest.  Pain not reproducible to movement of left arm or back.   Neurological: She is alert and oriented to person, place, and time.   Skin: Skin is warm and dry. No rash noted. She is not diaphoretic. No erythema. There is pallor.   Psychiatric: She has a normal mood and affect. Her behavior is normal.   Nursing note and vitals reviewed.      ECG 12 Lead    Date/Time: 2/1/2019 10:38 AM  Performed by: Austin Harden PA-C  Authorized by: Emergency, Triage Protocol, MD   Interpreted by physician  Rhythm: sinus rhythm  Rate: normal  BPM: 61  QRS axis: normal  Conduction: conduction normal  ST Segments: ST segments normal  T Waves: T waves normal                   ED Course  ED Course as of Feb 01 1504   Fri Feb 01, 2019   1304 Thee negative cardiac enzymes over 6 hours.  Will send for stress.  [TH]   1502 Per verbal from Dr. Medina - negative stress test  [TH]      ED Course User Index  [TH] Austin Harden PA-C                HEART Score (for prediction of 6-week risk of major adverse cardiac event) reviewed and/or performed as part of the patient evaluation and treatment planning process.  The result associated with this review/performance is: 4       MDM  Number of Diagnoses or Management Options     Amount and/or Complexity of Data Reviewed  Clinical lab tests: ordered and reviewed  Tests in the radiology section of CPT®: ordered and reviewed    Risk of Complications, Morbidity, and/or Mortality  General comments: Initial plan to admit patient for serial enzymes and stress.  However, hospitalist was consulted and did speak with cardiology who will accept patient with high heart score for stress now.  Stress ordered          Final diagnoses:   Atypical chest pain            Austin Harden PA-C  02/01/19  150

## 2019-02-01 NOTE — DISCHARGE INSTRUCTIONS
Chest Wall Pain  Chest wall pain is pain in or around the bones and muscles of your chest. Sometimes, an injury causes this pain. Sometimes, the cause may not be known. This pain may take several weeks or longer to get better.  Follow these instructions at home:  Pay attention to any changes in your symptoms. Take these actions to help with your pain:  · Rest as told by your health care provider.  · Avoid activities that cause pain. These include any activities that use your chest muscles or your abdominal and side muscles to lift heavy items.  · If directed, apply ice to the painful area:  ? Put ice in a plastic bag.  ? Place a towel between your skin and the bag.  ? Leave the ice on for 20 minutes, 2-3 times per day.  · Take over-the-counter and prescription medicines only as told by your health care provider.  · Do not use tobacco products, including cigarettes, chewing tobacco, and e-cigarettes. If you need help quitting, ask your health care provider.  · Keep all follow-up visits as told by your health care provider. This is important.    Contact a health care provider if:  · You have a fever.  · Your chest pain becomes worse.  · You have new symptoms.  Get help right away if:  · You have nausea or vomiting.  · You feel sweaty or light-headed.  · You have a cough with phlegm (sputum) or you cough up blood.  · You develop shortness of breath.  This information is not intended to replace advice given to you by your health care provider. Make sure you discuss any questions you have with your health care provider.  Document Released: 12/18/2006 Document Revised: 04/27/2017 Document Reviewed: 03/14/2016  [employer] Interactive Patient Education © 2018 [employer]

## 2019-06-02 PROBLEM — R53.1 GENERALIZED WEAKNESS: Status: ACTIVE | Noted: 2019-01-01

## 2019-06-02 PROBLEM — R26.2 INABILITY TO WALK: Status: ACTIVE | Noted: 2019-01-01

## 2019-06-02 PROBLEM — D72.829 LEUKOCYTOSIS: Status: ACTIVE | Noted: 2019-01-01

## 2019-06-02 PROBLEM — R77.8 ELEVATED TROPONIN: Status: ACTIVE | Noted: 2019-01-01

## 2019-06-02 PROBLEM — Z79.01 CURRENT USE OF LONG TERM ANTICOAGULATION: Status: ACTIVE | Noted: 2019-01-01

## 2019-06-02 PROBLEM — J18.9 PNEUMONIA DUE TO INFECTIOUS ORGANISM: Status: ACTIVE | Noted: 2019-01-01

## 2019-06-02 NOTE — PLAN OF CARE
Problem: Patient Care Overview  Goal: Plan of Care Review  Outcome: Ongoing (interventions implemented as appropriate)   06/02/19 1157   Coping/Psychosocial   Plan of Care Reviewed With patient   Plan of Care Review   Progress improving   OTHER   Outcome Summary Bedside Swallow Evaluation completed. No overt s/s of aspiration observed. Voice remained clear. Patient reports some coughing but not on daily basis. Lungs clear per RN notes, but CXR with possible pneumonia. Rec: 1) SLP ok with continuing current diet 2) We tre follow up tomorrow with possible meal observation to ensure safety with PO. Possible dysphagia study if concerns with silent aspiration.

## 2019-06-02 NOTE — ED PROVIDER NOTES
Subjective   Patient is brought to the emergency room by her family with complaint of generalized weakness.  She says is been building for some time but is gotten much worse in the last couple of days.  She has noticed increased swelling in her legs and feet.  She has some pain in the left side of her chest along her ribs.  She has increasing shortness of breath.  She does complains mostly of general malaise and feeling bad without any other specific symptoms.  Her family member says she had to help her get into the bed today and that is why they brought her in this morning because he knew something was very markedly difficult for the patient.  They were also worried that she was more confused this AM and worried about mental status changes this AM.        History provided by:  Patient and relative   used: No    Weakness - Generalized   Severity:  Severe  Onset quality:  Gradual  Duration:  1 week  Timing:  Constant  Progression:  Worsening  Chronicity:  New  Context: not alcohol use, not allergies, not change in medication, not decreased sleep, not dehydration, not drug use, not increased activity, not pinched nerve, not recent infection, not stress and not urinary tract infection    Relieved by:  Nothing  Worsened by:  Nothing  Ineffective treatments:  None tried  Associated symptoms: arthralgias, difficulty walking, myalgias and shortness of breath    Associated symptoms: no abdominal pain, no anorexia, no aphasia, no ataxia, no chest pain, no cough, no diarrhea, no dizziness, no drooling, no dysphagia, no dysuria, no numbness in extremities, no falls, no fever, no foul-smelling urine, no frequency, no headaches, no hematochezia, no lethargy, no loss of consciousness, no melena, no nausea, no near-syncope, no seizures, no sensory-motor deficit, no stroke symptoms, no syncope, no urgency, no vision change and no vomiting    Risk factors: no anemia, no congestive heart failure, no coronary  artery disease, no diabetes, no excessive menstruation, no family hx of stroke, no heart disease, no neurologic disease, no new medications and no recent stressors        Review of Systems   Constitutional: Negative.  Negative for fever.   HENT: Negative.  Negative for drooling.    Respiratory: Positive for shortness of breath. Negative for cough.    Cardiovascular: Negative.  Negative for chest pain, syncope and near-syncope.   Gastrointestinal: Negative.  Negative for abdominal pain, anorexia, diarrhea, dysphagia, hematochezia, melena, nausea and vomiting.   Genitourinary: Negative.  Negative for dysuria, frequency and urgency.   Musculoskeletal: Positive for arthralgias and myalgias. Negative for falls.   Neurological: Negative for dizziness, seizures, loss of consciousness and headaches.   Hematological: Negative.    Psychiatric/Behavioral: Negative.    All other systems reviewed and are negative.      Past Medical History:   Diagnosis Date   • Arthritis    • Colon polyp    • DVT (deep venous thrombosis) (CMS/[facility])    • GERD (gastroesophageal reflux disease)    • Hypertension    • PE (pulmonary thromboembolism) (CMS/[facility])    • Sleep apnea        No Known Allergies    Past Surgical History:   Procedure Laterality Date   • CHOLECYSTECTOMY     • COLONOSCOPY  09/24/2013   • COLONOSCOPY N/A 8/10/2017    Procedure: COLONOSCOPY WITH ANESTHESIA;  Surgeon: Jenna Page MD;  Location: [facility] ENDOSCOPY;  Service:    • ENDOSCOPY N/A 8/10/2017    Procedure: ESOPHAGOGASTRODUODENOSCOPY WITH ANESTHESIA;  Surgeon: Jenna Page MD;  Location: [facility] ENDOSCOPY;  Service:    • JOINT REPLACEMENT         Family History   Problem Relation Age of Onset   • No Known Problems Mother    • No Known Problems Father    • No Known Problems Sister    • No Known Problems Brother    • No Known Problems Daughter    • No Known Problems Son    • No Known Problems Maternal Grandmother    • No Known Problems Paternal Grandmother    • No Known  Problems Maternal Aunt    • No Known Problems Paternal Aunt    • Breast cancer Neg Hx    • Colon cancer Neg Hx    • Colon polyps Neg Hx    • BRCA 1/2 Neg Hx    • Endometrial cancer Neg Hx    • Ovarian cancer Neg Hx        Social History     Socioeconomic History   • Marital status: Single     Spouse name: Not on file   • Number of children: Not on file   • Years of education: Not on file   • Highest education level: Not on file   Tobacco Use   • Smoking status: Former Smoker   • Smokeless tobacco: Never Used   Substance and Sexual Activity   • Alcohol use: No   • Drug use: No   • Sexual activity: Defer       Prior to Admission medications    Medication Sig Start Date End Date Taking? Authorizing Provider   buPROPion XL (WELLBUTRIN XL) 300 MG 24 hr tablet Take 300 mg by mouth Daily.    Marixa Jansen MD   citalopram (CeleXA) 20 MG tablet Take 20 mg by mouth Daily.    Marixa Jansen MD   donepezil (ARICEPT) 10 MG tablet Take 10 mg by mouth Every Night.    Marixa Jansen MD   pravastatin (PRAVACHOL) 40 MG tablet Take 40 mg by mouth Daily.    Marixa Jansen MD   rivaroxaban (XARELTO) 20 MG tablet Take 20 mg by mouth Daily.    ProviderMarixa MD       Medications   sodium chloride 0.9 % flush 10 mL (not administered)   cefTRIAXone (ROCEPHIN) 1 g/100 mL 0.9% NS (MBP) (0 g Intravenous Stopped 6/2/19 0517)       Vitals:    06/02/19 0432   BP:    Pulse: 88   Resp:    Temp:    SpO2: 96%         Objective   Physical Exam   Constitutional: She is oriented to person, place, and time. She appears well-developed and well-nourished.   HENT:   Head: Normocephalic and atraumatic.   Eyes: Pupils are equal, round, and reactive to light.   Neck: Normal range of motion. Neck supple.   Cardiovascular: Normal rate.   Patient has a mostly sinus rhythm but has frequent ectopy on exam.   Pulmonary/Chest:   Patient is mildly tachypneic with decreased breath sounds on the left side.   Abdominal: Soft. Bowel  sounds are normal. There is no tenderness.   Patient is morbidly obese but nontender.   Musculoskeletal: Normal range of motion. She exhibits edema.   Patient has 3+ brawny edema in both lower extremities.   Neurological: She is alert and oriented to person, place, and time.   Patient is diffusely weak but has no focal weaknesses.   Skin: Skin is warm and dry.   Psychiatric: She has a normal mood and affect. Her behavior is normal.   Nursing note and vitals reviewed.      Procedures         Lab Results (last 24 hours)     Procedure Component Value Units Date/Time    POC Glucose Once [[id number]]  (Normal) Collected:  06/02/19 0211    Specimen:  Blood Updated:  06/02/19 0223     Glucose 113 mg/dL      Comment: : [id number] JamiMeter ID: [id number]       TSH [[id number]]  (Normal) Collected:  06/02/19 0230    Specimen:  Blood from Arm, Right Updated:  06/02/19 0359     TSH 3.820 mIU/mL     Blood Culture - Blood, Arm, Right [[id number]] Collected:  06/02/19 0230    Specimen:  Blood from Arm, Right Updated:  06/02/19 0307    Blood Culture - Blood, Arm, Left [[id number]] Collected:  06/02/19 0240    Specimen:  Blood from Arm, Left Updated:  06/02/19 0307    CBC & Differential [[id number]] Collected:  06/02/19 0252    Specimen:  Blood Updated:  06/02/19 0308    Narrative:       The following orders were created for panel order CBC & Differential.  Procedure                               Abnormality         Status                     ---------                               -----------         ------                     CBC Auto Differential[[id number]]        Abnormal            Final result                 Please view results for these tests on the individual orders.    Comprehensive Metabolic Panel [[id number]]  (Abnormal) Collected:  06/02/19 0252    Specimen:  Blood Updated:  06/02/19 0319     Glucose 117 mg/dL      BUN 33 mg/dL      Creatinine 1.07 mg/dL      Sodium 135 mmol/L      Potassium 4.2 mmol/L       Chloride 107 mmol/L      CO2 23.0 mmol/L      Calcium 8.2 mg/dL      Total Protein 5.9 g/dL      Albumin 2.90 g/dL      ALT (SGPT) 229 U/L      AST (SGOT) 350 U/L      Alkaline Phosphatase 104 U/L      Total Bilirubin 0.6 mg/dL      eGFR   Amer 61 mL/min/1.73      Globulin 3.0 gm/dL      A/G Ratio 1.0 g/dL      BUN/Creatinine Ratio 30.8     Anion Gap 5.0 mmol/L     Narrative:       GFR Normal >60  Chronic Kidney Disease <60  Kidney Failure <15    BNP [[id number]]  (Normal) Collected:  06/02/19 0252    Specimen:  Blood Updated:  06/02/19 0331     proBNP 665.0 pg/mL     D-dimer, Quantitative [[id number]]  (Abnormal) Collected:  06/02/19 0252    Specimen:  Blood Updated:  06/02/19 0318     D-Dimer, Quantitative 1.17 mg/L (FEU)     Narrative:       Reference Range is 0-0.50 mg/L FEU. However, results <0.50 mg/L FEU tends to rule out DVT or PE. Results >0.50 mg/L FEU are not useful in predicting absence or presence of DVT or PE.    Troponin [[id number]]  (Abnormal) Collected:  06/02/19 0252    Specimen:  Blood Updated:  06/02/19 0331     Troponin I 0.112 ng/mL     Magnesium [[id number]]  (Normal) Collected:  06/02/19 0252    Specimen:  Blood Updated:  06/02/19 0319     Magnesium 1.7 mg/dL     Lactic Acid, Plasma [[id number]]  (Normal) Collected:  06/02/19 0252    Specimen:  Blood Updated:  06/02/19 0317     Lactate 1.4 mmol/L     CBC Auto Differential [[id number]]  (Abnormal) Collected:  06/02/19 0252    Specimen:  Blood Updated:  06/02/19 0308     WBC 18.72 10*3/mm3      RBC 3.95 10*6/mm3      Hemoglobin 10.2 g/dL      Hematocrit 30.3 %      MCV 76.7 fL      MCH 25.8 pg      MCHC 33.7 g/dL      RDW 13.7 %      RDW-SD 38.4 fl      MPV 10.0 fL      Platelets 321 10*3/mm3      Neutrophil % 81.5 %      Lymphocyte % 7.6 %      Monocyte % 3.5 %      Eosinophil % 3.0 %      Basophil % 0.3 %      Immature Grans % 4.1 %      Neutrophils, Absolute 15.26 10*3/mm3      Lymphocytes, Absolute 1.42 10*3/mm3      Monocytes, Absolute  0.66 10*3/mm3      Eosinophils, Absolute 0.56 10*3/mm3      Basophils, Absolute 0.06 10*3/mm3      Immature Grans, Absolute 0.76 10*3/mm3      nRBC 0.0 /100 WBC     Urinalysis With Culture If Indicated - Urine, Catheter [[id number]]  (Abnormal) Collected:  06/02/19 0356    Specimen:  Urine, Catheter Updated:  06/02/19 0431     Color, UA Yellow     Appearance, UA Cloudy     pH, UA <=5.0     Specific Gravity, UA 1.015     Glucose, UA Negative     Ketones, UA Negative     Bilirubin, UA Negative     Blood, UA Large (3+)     Protein, UA Trace     Leuk Esterase, UA Negative     Nitrite, UA Negative     Urobilinogen, UA 0.2 E.U./dL    Urinalysis, Microscopic Only - Urine, Catheter [[id number]]  (Abnormal) Collected:  06/02/19 0356    Specimen:  Urine, Catheter Updated:  06/02/19 0431     RBC, UA 0-2 /HPF      WBC, UA None Seen /HPF      Bacteria, UA None Seen /HPF      Squamous Epithelial Cells, UA 3-6 /HPF      Hyaline Casts, UA None Seen /LPF      Granular Casts, UA 0-2 /LPF      Amorphous Crystals, UA Small/1+ /HPF      Methodology Automated Microscopy          XR Chest 1 View    (Results Pending)   CT Head Without Contrast    (Results Pending)       ED Course  ED Course as of Jun 02 0530   Sun Jun 02, 2019   0512 I told the patient and her family of the findings on the studies here.  There does not seem to be any heart failure by her chest ray x-ray and BNP but she does have this edema in her legs.  I am concerned because her white blood cell count is markedly elevated and I do not have a good source of infection.  Her chemistries are not very bad.  But she still is unable to walk and according to family this is a more change for her.  I have spoke with Dr. Gardner and we will admit for further evaluation.  [TR]      ED Course User Index  [TR] Yves Bradley Jr., MD          MDM  Number of Diagnoses or Management Options  Inability to walk: new and requires workup  Leukocytosis, unspecified type: new and requires  workup     Amount and/or Complexity of Data Reviewed  Clinical lab tests: ordered and reviewed  Tests in the radiology section of CPT®: ordered and reviewed  Tests in the medicine section of CPT®: ordered and reviewed  Discuss the patient with other providers: yes    Risk of Complications, Morbidity, and/or Mortality  Presenting problems: moderate  Diagnostic procedures: moderate  Management options: moderate    Patient Progress  Patient progress: stable      Final diagnoses:   Inability to walk   Leukocytosis, unspecified type          Yves Bradley Jr., MD  06/02/19 0514       Yves Bradley Jr., MD  06/02/19 0531

## 2019-06-02 NOTE — PLAN OF CARE
Problem: Patient Care Overview  Goal: Plan of Care Review  Outcome: Ongoing (interventions implemented as appropriate)  Just admitted increasing weakness for 1 week - RA no acute distress - will implement orders & monitor

## 2019-06-02 NOTE — PROGRESS NOTES
"    [facility] Medicine Services  INPATIENT PROGRESS NOTE    Patient Name: Karen Snell  Date of Admission: 6/2/2019  Today's Date: 06/02/19  Length of Stay: 0  Primary Care Physician: Eamon Elizalde, BJORN    Subjective   Chief Complaint: follow up weakness  HPI   Pt is sitting up in bed eating breakfast. She admits that she sometimes gets \"strangled\" on water. States he still feels weak. Admits that she has been more short of breath. Denies any chest pain. No family at bedside. Nursing reports she was able to get up with 1 assist. CT shows developing bilateral pneumonia.     Review of Systems   All pertinent negatives and positives are as above. All other systems have been reviewed and are negative unless otherwise stated.     Objective    Temp:  [97.7 °F (36.5 °C)-98.2 °F (36.8 °C)] 97.7 °F (36.5 °C)  Heart Rate:  [] 88  Resp:  [17-20] 18  BP: ()/(51-96) 123/64  Physical Exam   Constitutional: She is oriented to person, place, and time. She appears well-developed.   Chronically ill appearing.    HENT:   Head: Normocephalic and atraumatic.   Eyes: Conjunctivae and EOM are normal. Pupils are equal, round, and reactive to light.   Neck: Neck supple. No JVD present. No thyromegaly present.   Cardiovascular: Normal rate, regular rhythm, normal heart sounds and intact distal pulses. Exam reveals no gallop and no friction rub.   No murmur heard.  Sinus with PAC's/PVC's 78-82   Pulmonary/Chest: Effort normal. No respiratory distress. She has no wheezes. She has no rales. She exhibits no tenderness.   Diminished bilaterally.    Abdominal: Soft. Bowel sounds are normal. She exhibits no distension. There is no tenderness. There is no rebound and no guarding.   Musculoskeletal: Normal range of motion. She exhibits edema (tender and overall puffy bilateral lower extremities. ). She exhibits no tenderness or deformity.   Lymphadenopathy:     She has no cervical adenopathy. "   Neurological: She is alert and oriented to person, place, and time. She displays normal reflexes. No cranial nerve deficit. She exhibits normal muscle tone.   Skin: Skin is warm and dry. No rash noted.   Psychiatric: She has a normal mood and affect. Her behavior is normal. Judgment and thought content normal.     Results Review:  I have reviewed the labs, radiology results, and diagnostic studies.    Laboratory Data:   Results from last 7 days   Lab Units 06/02/19  0252   WBC 10*3/mm3 18.72*   HEMOGLOBIN g/dL 10.2*   HEMATOCRIT % 30.3*   PLATELETS 10*3/mm3 321        Results from last 7 days   Lab Units 06/02/19  0252   SODIUM mmol/L 135   POTASSIUM mmol/L 4.2   CHLORIDE mmol/L 107   CO2 mmol/L 23.0*   BUN mg/dL 33*   CREATININE mg/dL 1.07   CALCIUM mg/dL 8.2*   BILIRUBIN mg/dL 0.6   ALK PHOS U/L 104   ALT (SGPT) U/L 229*   AST (SGOT) U/L 350*   GLUCOSE mg/dL 117*     Radiology Data:   Imaging Results (last 24 hours)     Procedure Component Value Units Date/Time    XR Chest 1 View [[id number]] Collected:  06/02/19 0840     Updated:  06/02/19 0843    Narrative:       EXAMINATION: XR CHEST 1 VW-     6/2/2019 2:41 AM CDT     HISTORY: Short of breath.     1 view chest x-ray compared with 02/01/2019.     Findings heart size.  Normal mediastinum.     Full expanded lungs with chronic interstitial disease and a few  granulomas.     No focal infiltrate, pneumothorax, or heart failure.     Summary:  1. Chronic lung changes.     This report was finalized on 06/02/2019 08:40 by Dr. Julián Garcia MD.    CT Chest Without Contrast [[id number]] Collected:  06/02/19 0814     Updated:  06/02/19 0821    Narrative:       EXAMINATION: CT CHEST WO CONTRAST-      6/2/2019 6:08 AM CDT     HISTORY: COPD exacerbation, complicated; R26.2-Difficulty in walking,  not elsewhere classified; D72.829-Elevated white blood cell count,  unspecified     In order to have a CT radiation dose as low as reasonably achievable  Automated Exposure Control  was utilized for adjustment of the mA and/or  KV according to patient size.     DLP in mGycm= 265.     Noncontrast chest CT compared with 08/08/2016.     A preliminary [facility] report was faxed to the Emergency Department  immediately after this study was interpreted at 7:28 AM.     Cardiomegaly. Trace amount pericardial fluid.  No thoracic aortic aneurysm.  No mediastinal mass.     Patchy bibasilar infiltrate compatible with developing pneumonia. No  dense consolidation or pleural fluid.  No pneumothorax or heart failure.     Extensive degenerative thoracic spurring.  Stable benign-appearing 2 cm low-density lesion within the left hepatic  lobe.  Incidental mid esophageal fluid noted.     Summary:  1. Bibasilar infiltrate compatible with developing pneumonia.                                   This report was finalized on 06/02/2019 08:18 by Dr. Julián Garcia MD.    CT Head Without Contrast [[id number]] Collected:  06/02/19 0813     Updated:  06/02/19 0817    Narrative:       EXAMINATION: CT HEAD WO CONTRAST-      6/2/2019 6:08 AM CDT     HISTORY: Stroke; R26.2-Difficulty in walking, not elsewhere classified;  D72.829-Elevated white blood cell count, unspecified     In order to have a CT radiation dose as low as reasonably achievable  Automated Exposure Control was utilized for adjustment of the mA and/or  KV according to patient size.     DLP in mGycm= 540.     Noncontrast head CT compared with 05/31/2027.     A preliminary [facility] report was faxed to the Emergency Department  immediately after this study was interpreted at 7:19 AM.     Axial, sagittal, and coronal noncontrast CT imaging of the head.     The visualized paranasal sinuses are clear.     The brain and ventricles have an age appropriate appearance.   There is no hemorrhage or mass-effect.   No acute infarction is seen.     No calvarial abnormality.       Impression:       1. No acute intracranial abnormality is seen.                                          This report was finalized on 06/02/2019 08:14 by Dr. Julián Garcia MD.          I have reviewed the patient's current medications.     Assessment/Plan     Active Hospital Problems    Diagnosis   • **Pneumonia due to infectious organism   • Elevated troponin   • Current use of long term anticoagulation   • Leukocytosis   • Generalized weakness     Plan:  1. Will begin azithromycin in addition to Rocephin.   2. Add duonebs.   3. Consult speech therapy  4. Consult p hysical therapy.   5. Will give IV fluids for today and then dc.   6. Repeat labs in am.     Discharge Planning: I expect the patient to be discharged to home in ? days.    BJORN Wills   06/02/19   9:16 AM   I personally evaluated and examined the patient in conjunction with BJORN Estrada and agree with the assessment, treatment plan, and disposition of the patient as recorded by her. My history, exam, and further recommendations are:     She presented with generalized weakness, shortness of breath with activity  On physical exam was only noted to have edema and some left great toe with some mild tenderness, discoloration and bogginess  Diagnostic studies showed large amount of blood however on microscopic exam was only 0-2.  Patient had elevated d-dimer at 1.17 and leukocytosis.  She has microcytic anemia and normal platelet  She was admitted with a diagnosis of inability to walk, elevated troponin and current use of long-term anticoagulation.  She also has leukocytosis.  She underwent chest CT angiogram which was read as bilateral infiltrate compatible with developing pneumonia.  Currently is on Rocephin and azithromycin.    She has not had any record of febrile illness since admission  Blood cultures been drawn  Vitals:    06/02/19 1551   BP: 100/53   Pulse: 89   Resp: 16   Temp: 98.5 °F (36.9 °C)   SpO2: 95%   Stable vital signs  On conversation with son and patient, the main complaint was her inability to get up on her bed.  She has a  very heavy and large legs.  Heat she has positive d-dimer with negative CT angiogram of the chest.  I would think she has increased risk for DVT due to limited mobility related to vary usage bilateral lower extremities.    I really am not able to get any particular symptoms of fever or coughing or wheezing but basically likely because of positive d-dimer she ended up with CT angiogram of the chest.  I take into consideration that the patient has leukocyte count of 18,000.    Her lung exam has diminished breath sounds due to body habitus but otherwise clear from my examination today.  As mentioned above she is on Rocephin and Zithromax    For now I think it is reasonable to continue on IV antibiotics and repeat CBC in the morning    She probably can be discharged home soon with consideration for oral antibiotic.     Kin Barrios MD  06/02/19  4:32 PM

## 2019-06-02 NOTE — PROGRESS NOTES
Discharge Planning Assessment  [facility]     Patient Name: Karen Snell  MRN: [id number]  Today's Date: 6/2/2019    Admit Date: 6/2/2019    Discharge Needs Assessment     Row Name 06/02/19 1043       Living Environment    Lives With  child(josiah), adult    Name(s) of Who Lives With Patient  Beulah Ha    Current Living Arrangements  home/apartment/condo    Primary Care Provided by  self    Provides Primary Care For  no one    Family Caregiver if Needed  child(josiah), adult    Quality of Family Relationships  helpful;involved;supportive    Able to Return to Prior Arrangements  yes       Resource/Environmental Concerns    Resource/Environmental Concerns  none       Transition Planning    Patient/Family Anticipates Transition to  home with family    Patient/Family Anticipated Services at Transition  home health care    Transportation Anticipated  family or friend will provide       Discharge Needs Assessment    Readmission Within the Last 30 Days  no previous admission in last 30 days    Concerns to be Addressed  no discharge needs identified    Equipment Currently Used at Home  walker, rolling;wheelchair, motorized;bath bench    Anticipated Changes Related to Illness  none    Equipment Needed After Discharge  none    Offered/Gave Vendor List  yes    Patient's Choice of Community Agency(s)  [facility]    Current Discharge Risk  chronically ill    Discharge Coordination/Progress  Pt has PCP and RX coverage.  Pt can afford medications.  Pt lives with Leland naranjo and plans to dc back to his home.  Pt is requesting [facility] upon dc.  SW will follow for MD orders to arrange.          Discharge Plan    No documentation.       Destination      No service coordination in this encounter.      Durable Medical Equipment      No service coordination in this encounter.      Dialysis/Infusion      No service coordination in this encounter.      Home Medical Care      No service coordination in this encounter.      Therapy      No service  coordination in this encounter.      Community Resources      No service coordination in this encounter.          Demographic Summary    No documentation.       Functional Status    No documentation.       Psychosocial    No documentation.       Abuse/Neglect    No documentation.       Legal    No documentation.       Substance Abuse    No documentation.       Patient Forms    No documentation.           ALFONSO HoldenW

## 2019-06-02 NOTE — THERAPY EVALUATION
Acute Care - Speech Language Pathology   Swallow Initial Evaluation [facility]     Patient Name: Karen Snell  : 1945  MRN: [id number]  Today's Date: 2019               Admit Date: 2019    Bedside Swallow Evaluation completed.  No overt s/s of aspiration observed.  Voice remaine  d clear.  Patient reports some coughing but not on daily basis.  Lungs clear per RN notes, but CXR with possible pneumonia.    Rec:   1) SLP ok with continuing current diet   2) We tre follow up tomorrow with possible meal observation to ensure safety with PO.  Possible dysphagia study if concerns with silent aspiration.   Delfina Reis, MS CCC-SLP 2019 12:03 PM    Visit Dx:     ICD-10-CM ICD-9-CM   1. Inability to walk R26.2 719.7   2. Leukocytosis, unspecified type D72.829 288.60   3. Other dysphagia R13.19 787.29     Patient Active Problem List   Diagnosis   • Colon polyps   • Epigastric pain   • History of DVT in adulthood   • Altered mental status   • Hernia, umbilical   • Abnormal finding on imaging   • Inability to walk   • Elevated troponin   • Current use of long term anticoagulation   • Leukocytosis   • Pneumonia due to infectious organism   • Generalized weakness     Past Medical History:   Diagnosis Date   • Arthritis    • Colon polyp    • DVT (deep venous thrombosis) (CMS/HCC)    • GERD (gastroesophageal reflux disease)    • Hypertension    • PE (pulmonary thromboembolism) (CMS/HCC)    • Sleep apnea      Past Surgical History:   Procedure Laterality Date   • CHOLECYSTECTOMY     • COLONOSCOPY  2013   • COLONOSCOPY N/A 8/10/2017    Procedure: COLONOSCOPY WITH ANESTHESIA;  Surgeon: Jenna Page MD;  Location: [facility] ENDOSCOPY;  Service:    • ENDOSCOPY N/A 8/10/2017    Procedure: ESOPHAGOGASTRODUODENOSCOPY WITH ANESTHESIA;  Surgeon: Jenna Page MD;  Location: [facility] ENDOSCOPY;  Service:    • JOINT REPLACEMENT          SWALLOW EVALUATION (last 72 hours)      SLP Adult Swallow Evaluation     Row  Name 06/02/19 1045                   Rehab Evaluation    Document Type  evaluation  -KW        Subjective Information  no complaints  -KW        Patient Observations  alert;cooperative  -KW        Patient Effort  good  -KW           General Information    Patient Profile Reviewed  yes  -KW        Pertinent History Of Current Problem  Reports coughing at time with water, not daily, but worse lately.  CXR with possible pneumonia left lung  -KW        Current Method of Nutrition  regular textures;thin liquids  -KW        Precautions/Limitations, Vision  WFL  -KW        Precautions/Limitations, Hearing  WFL  -KW        Prior Level of Function-Communication  WFL  -KW        Prior Level of Function-Swallowing  no diet consistency restrictions  -KW        Plans/Goals Discussed with  patient  -KW        Barriers to Rehab  none identified  -KW        Patient's Goals for Discharge  return to all previous roles/activities  -KW           Pain Assessment    Additional Documentation  Pain Scale: FACES Pre/Post-Treatment (Group)  -KW           Pain Scale: FACES Pre/Post-Treatment    Pain: FACES Scale, Pretreatment  0-->no hurt  -KW        Pain: FACES Scale, Post-Treatment  0-->no hurt  -KW           Oral Motor and Function    Dentition Assessment  missing teeth  -KW        Secretion Management  WNL/WFL  -KW        Mucosal Quality  moist, healthy  -KW        Volitional Swallow  WFL  -KW        Volitional Cough  WFL  -KW           Oral Musculature and Cranial Nerve Assessment    Oral Motor General Assessment  WFL  -KW           General Eating/Swallowing Observations    Respiratory Support Currently in Use  room air  -KW        Eating/Swallowing Skills  fed by SLP  -KW        Positioning During Eating  upright in bed  -KW        Utensils Used  spoon;straw  -KW        Consistencies Trialed  regular textures;thin liquids;nectar/syrup-thick liquids;honey-thick liquids;pudding thick  -KW           Clinical Swallow Eval    Oral Prep Phase   WFL  -KW        Oral Transit  WFL  -KW        Oral Residue  WFL  -KW        Pharyngeal Phase  no overt signs/symptoms of pharyngeal impairment  -KW        Esophageal Phase  unremarkable  -KW        Clinical Swallow Evaluation Summary  Bedside Swallow Evaluation completed.  No overt s/s of aspiration observed.  Voice remained clear.  Patient reports some coughing but not on daily basis.  Lungs clear per RN notes, but CXR with possible pneumonia.  Rec: 1) SLP ok with continuing current diet 2) We tre follow up tomorrow with possible meal observation to ensure safety with PO.  Possible dysphagia study if concerns with silent aspiration.  -KW           Clinical Impression    SLP Swallowing Diagnosis  suspected pharyngeal dysfunction r/o  -KW        Functional Impact  risk of aspiration/pneumonia  -KW        Rehab Potential/Prognosis, Swallowing  good, to achieve stated therapy goals  -KW        Swallow Criteria for Skilled Therapeutic Interventions Met  demonstrates skilled criteria  -KW           Recommendations    Therapy Frequency (Swallow)  PRN  -KW        Predicted Duration Therapy Intervention (Days)  until discharge  -KW        SLP Diet Recommendation  regular textures;thin liquids  -KW        Recommended Precautions and Strategies  upright posture during/after eating;small bites of food and sips of liquid  -KW        SLP Rec. for Method of Medication Administration  meds whole;with thin liquids  -KW        Monitor for Signs of Aspiration  notify SLP if any concerns  -KW        Anticipated Dischage Disposition  unknown  -KW           Swallow Goals (SLP)    Oral Nutrition/Hydration Goal Selection (SLP)  oral nutrition/hydration, SLP goal 1  -KW           Oral Nutrition/Hydration Goal 1 (SLP)    Oral Nutrition/Hydration Goal 1, SLP  LTG: Patient will tolerate LRD with no overt s/s of aspiration.  -KW        Time Frame (Oral Nutrition/Hydration Goal 1, SLP)  short term goal (STG);by discharge  -KW        Barriers  (Oral Nutrition/Hydration Goal 1, SLP)  n/a  -KW        Progress/Outcomes (Oral Nutrition/Hydration Goal 1, SLP)  goal ongoing  -KW          User Key  (r) = Recorded By, (t) = Taken By, (c) = Cosigned By    Initials Name Effective Dates    Delfina Hdez, MS [facility]-SLP 08/02/16 -           EDUCATION  The patient has been educated in the following areas:   Dysphagia (Swallowing Impairment).    SLP Recommendation and Plan  SLP Swallowing Diagnosis: suspected pharyngeal dysfunction(r/o)  SLP Diet Recommendation: regular textures, thin liquids  Recommended Precautions and Strategies: upright posture during/after eating, small bites of food and sips of liquid  SLP Rec. for Method of Medication Administration: meds whole, with thin liquids     Monitor for Signs of Aspiration: notify SLP if any concerns     Swallow Criteria for Skilled Therapeutic Interventions Met: demonstrates skilled criteria  Anticipated Dischage Disposition: unknown  Rehab Potential/Prognosis, Swallowing: good, to achieve stated therapy goals  Therapy Frequency (Swallow): PRN  Predicted Duration Therapy Intervention (Days): until discharge       Plan of Care Reviewed With: patient  Plan of Care Review  Plan of Care Reviewed With: patient  Progress: improving  Outcome Summary: Bedside Swallow Evaluation completed.  No overt s/s of aspiration observed.  Voice remained clear.  Patient reports some coughing but not on daily basis.  Lungs clear per RN notes, but CXR with possible pneumonia.  Rec: 1) SLP ok with continuing current diet 2) We tre follow up tomorrow with possible meal observation to ensure safety with PO.  Possible dysphagia study if concerns with silent aspiration.     SLP GOALS     Row Name 06/02/19 1045             Oral Nutrition/Hydration Goal 1 (SLP)    Oral Nutrition/Hydration Goal 1, SLP  LTG: Patient will tolerate LRD with no overt s/s of aspiration.  -KW      Time Frame (Oral Nutrition/Hydration Goal 1, SLP)  short term goal  (STG);by discharge  -KW      Barriers (Oral Nutrition/Hydration Goal 1, SLP)  n/a  -KW      Progress/Outcomes (Oral Nutrition/Hydration Goal 1, SLP)  goal ongoing  -KW        User Key  (r) = Recorded By, (t) = Taken By, (c) = Cosigned By    Initials Name Provider Type    Delfina Hdez MS CCC-SLP Speech and Language Pathologist             Time Calculation:   Time Calculation- SLP     Row Name 06/02/19 1202             Time Calculation- SLP    SLP Start Time  1045  -KW      SLP Stop Time  1130  -KW      SLP Time Calculation (min)  45 min  -KW      SLP Received On  06/02/19  -KW      SLP Goal Re-Cert Due Date  06/12/19  -KW        User Key  (r) = Recorded By, (t) = Taken By, (c) = Cosigned By    Initials Name Provider Type    Delfina Hdez MS CCC-SLP Speech and Language Pathologist          Therapy Charges for Today     Code Description Service Date Service Provider Modifiers Qty    [id number] HC ST EVAL ORAL PHARYNG SWALLOW 3 6/2/2019 Delfina Reis MS CCC-SLP GN 1               Delfina Reis MS CCC-SLP  6/2/2019

## 2019-06-02 NOTE — H&P
[facility] Medicine Services  HISTORY AND PHYSICAL    Date of Admission: 6/2/2019  Primary Care Physician: Eamon Elizalde APRN    Subjective     Chief Complaint: generalized weakness    History of Present Illness  Patient is a 74-year-old -American female with known past history of bilateral PE, GERD, hypertension who is brought to the ER by her son for complaints of generalized weakness and just not seeming herself.  He states he is having to help her in and out of the bed which is not normal for her and states that she has been having some mild confusion.  Patient denies to me any symptoms of chest pain however does state that she has some shortness of breath with activity which is fairly normal for her however may be slightly worse than usual.  She states she has some diarrhea after eating meals but resolves after that.  She has some ongoing left-sided abdominal discomfort however she has not had a colonoscopy and EGD to evaluate this with no real findings.  She denies having any dysuria or fever.  She has been taking her blood thinner daily for diagnosis of PE about 1 year ago.  In the ER patient has a mildly elevated troponin.  Her blood pressure is marginal however it is been taking on her forearm at this time.  CT head and CT chest are being ordered and pending at this time.        Review of Systems     Otherwise complete ROS reviewed and negative except as mentioned in the HPI.    Past Medical History:   Past Medical History:   Diagnosis Date   • Arthritis    • Colon polyp    • DVT (deep venous thrombosis) (CMS/HCC)    • GERD (gastroesophageal reflux disease)    • Hypertension    • PE (pulmonary thromboembolism) (CMS/HCC)    • Sleep apnea      Past Surgical History:  Past Surgical History:   Procedure Laterality Date   • CHOLECYSTECTOMY     • COLONOSCOPY  09/24/2013   • COLONOSCOPY N/A 8/10/2017    Procedure: COLONOSCOPY WITH ANESTHESIA;  Surgeon: Jenna Page,  "MD;  Location: [facility] ENDOSCOPY;  Service:    • ENDOSCOPY N/A 8/10/2017    Procedure: ESOPHAGOGASTRODUODENOSCOPY WITH ANESTHESIA;  Surgeon: Jenna Page MD;  Location: [facility] ENDOSCOPY;  Service:    • JOINT REPLACEMENT       Social History:  reports that she has quit smoking. She has never used smokeless tobacco. She reports that she does not drink alcohol or use drugs.    Family History: family history includes No Known Problems in her brother, daughter, father, maternal aunt, maternal grandmother, mother, paternal aunt, paternal grandmother, sister, and son.       Allergies:  No Known Allergies  Medications:  Prior to Admission medications    Medication Sig Start Date End Date Taking? Authorizing Provider   buPROPion XL (WELLBUTRIN XL) 300 MG 24 hr tablet Take 300 mg by mouth Daily.    Marixa Jansen MD   citalopram (CeleXA) 20 MG tablet Take 20 mg by mouth Daily.    Marixa Jansen MD   donepezil (ARICEPT) 10 MG tablet Take 10 mg by mouth Every Night.    Marixa Jansen MD   pravastatin (PRAVACHOL) 40 MG tablet Take 40 mg by mouth Daily.    Marixa Jansen MD   rivaroxaban (XARELTO) 20 MG tablet Take 20 mg by mouth Daily.    Marixa Jansen MD     Objective     Vital Signs: BP 95/51   Pulse 91   Temp 98.2 °F (36.8 °C)   Resp 20   Ht 170.2 cm (67\")   Wt 109 kg (240 lb)   SpO2 98%   BMI 37.59 kg/m²   Physical Exam   Constitutional: She is oriented to person, place, and time. She appears well-developed and well-nourished.   HENT:   Head: Normocephalic and atraumatic.   Eyes: Conjunctivae and EOM are normal. Pupils are equal, round, and reactive to light.   Neck: Neck supple. No JVD present. No thyromegaly present.   Cardiovascular: Normal rate, regular rhythm, normal heart sounds and intact distal pulses. Exam reveals no gallop and no friction rub.   No murmur heard.  Pulmonary/Chest: Effort normal and breath sounds normal. No respiratory distress. She has no wheezes. She has " no rales. She exhibits no tenderness.   Abdominal: Soft. Bowel sounds are normal. She exhibits no distension. There is no tenderness. There is no rebound and no guarding.   Musculoskeletal: Normal range of motion. She exhibits edema. She exhibits no tenderness or deformity.   Left great toe with some mild tenderness, discoloration, some bogginess   Lymphadenopathy:     She has no cervical adenopathy.   Neurological: She is alert and oriented to person, place, and time. She displays normal reflexes. No cranial nerve deficit. She exhibits normal muscle tone.   Skin: Skin is warm and dry. No rash noted.   Psychiatric: She has a normal mood and affect. Her behavior is normal. Judgment and thought content normal.           Results Reviewed:  Lab Results (last 24 hours)     Procedure Component Value Units Date/Time    Urinalysis With Culture If Indicated - Urine, Catheter [[id number]]  (Abnormal) Collected:  06/02/19 0356    Specimen:  Urine, Catheter Updated:  06/02/19 0431     Color, UA Yellow     Appearance, UA Cloudy     pH, UA <=5.0     Specific Gravity, UA 1.015     Glucose, UA Negative     Ketones, UA Negative     Bilirubin, UA Negative     Blood, UA Large (3+)     Protein, UA Trace     Leuk Esterase, UA Negative     Nitrite, UA Negative     Urobilinogen, UA 0.2 E.U./dL    Urinalysis, Microscopic Only - Urine, Catheter [[id number]]  (Abnormal) Collected:  06/02/19 0356    Specimen:  Urine, Catheter Updated:  06/02/19 0431     RBC, UA 0-2 /HPF      WBC, UA None Seen /HPF      Bacteria, UA None Seen /HPF      Squamous Epithelial Cells, UA 3-6 /HPF      Hyaline Casts, UA None Seen /LPF      Granular Casts, UA 0-2 /LPF      Amorphous Crystals, UA Small/1+ /HPF      Methodology Automated Microscopy    TSH [[id number]]  (Normal) Collected:  06/02/19 0230    Specimen:  Blood from Arm, Right Updated:  06/02/19 0359     TSH 3.820 mIU/mL     BNP [[id number]]  (Normal) Collected:  06/02/19 0252    Specimen:  Blood Updated:   06/02/19 0331     proBNP 665.0 pg/mL     Troponin [[id number]]  (Abnormal) Collected:  06/02/19 0252    Specimen:  Blood Updated:  06/02/19 0331     Troponin I 0.112 ng/mL     Comprehensive Metabolic Panel [[id number]]  (Abnormal) Collected:  06/02/19 0252    Specimen:  Blood Updated:  06/02/19 0319     Glucose 117 mg/dL      BUN 33 mg/dL      Creatinine 1.07 mg/dL      Sodium 135 mmol/L      Potassium 4.2 mmol/L      Chloride 107 mmol/L      CO2 23.0 mmol/L      Calcium 8.2 mg/dL      Total Protein 5.9 g/dL      Albumin 2.90 g/dL      ALT (SGPT) 229 U/L      AST (SGOT) 350 U/L      Alkaline Phosphatase 104 U/L      Total Bilirubin 0.6 mg/dL      eGFR   Amer 61 mL/min/1.73      Globulin 3.0 gm/dL      A/G Ratio 1.0 g/dL      BUN/Creatinine Ratio 30.8     Anion Gap 5.0 mmol/L     Narrative:       GFR Normal >60  Chronic Kidney Disease <60  Kidney Failure <15    Magnesium [[id number]]  (Normal) Collected:  06/02/19 0252    Specimen:  Blood Updated:  06/02/19 0319     Magnesium 1.7 mg/dL     D-dimer, Quantitative [[id number]]  (Abnormal) Collected:  06/02/19 0252    Specimen:  Blood Updated:  06/02/19 0318     D-Dimer, Quantitative 1.17 mg/L (FEU)     Narrative:       Reference Range is 0-0.50 mg/L FEU. However, results <0.50 mg/L FEU tends to rule out DVT or PE. Results >0.50 mg/L FEU are not useful in predicting absence or presence of DVT or PE.    Lactic Acid, Plasma [[id number]]  (Normal) Collected:  06/02/19 0252    Specimen:  Blood Updated:  06/02/19 0317     Lactate 1.4 mmol/L     CBC & Differential [[id number]] Collected:  06/02/19 0252    Specimen:  Blood Updated:  06/02/19 0308    Narrative:       The following orders were created for panel order CBC & Differential.  Procedure                               Abnormality         Status                     ---------                               -----------         ------                     CBC Auto Differential[[id number]]        Abnormal            Final  result                 Please view results for these tests on the individual orders.    CBC Auto Differential [[id number]]  (Abnormal) Collected:  06/02/19 0252    Specimen:  Blood Updated:  06/02/19 0308     WBC 18.72 10*3/mm3      RBC 3.95 10*6/mm3      Hemoglobin 10.2 g/dL      Hematocrit 30.3 %      MCV 76.7 fL      MCH 25.8 pg      MCHC 33.7 g/dL      RDW 13.7 %      RDW-SD 38.4 fl      MPV 10.0 fL      Platelets 321 10*3/mm3      Neutrophil % 81.5 %      Lymphocyte % 7.6 %      Monocyte % 3.5 %      Eosinophil % 3.0 %      Basophil % 0.3 %      Immature Grans % 4.1 %      Neutrophils, Absolute 15.26 10*3/mm3      Lymphocytes, Absolute 1.42 10*3/mm3      Monocytes, Absolute 0.66 10*3/mm3      Eosinophils, Absolute 0.56 10*3/mm3      Basophils, Absolute 0.06 10*3/mm3      Immature Grans, Absolute 0.76 10*3/mm3      nRBC 0.0 /100 WBC     Blood Culture - Blood, Arm, Right [[id number]] Collected:  06/02/19 0230    Specimen:  Blood from Arm, Right Updated:  06/02/19 0307    Blood Culture - Blood, Arm, Left [[id number]] Collected:  06/02/19 0240    Specimen:  Blood from Arm, Left Updated:  06/02/19 0307    POC Glucose Once [[id number]]  (Normal) Collected:  06/02/19 0211    Specimen:  Blood Updated:  06/02/19 0223     Glucose 113 mg/dL      Comment: : [id number] JamiMeter ID: [id number]           Imaging Results (last 24 hours)     Procedure Component Value Units Date/Time    XR Chest 1 View [[id number]] Updated:  06/02/19 0244        I have personally reviewed and interpreted the radiology studies and ECG obtained at time of admission.     Assessment / Plan     Assessment:   Active Hospital Problems    Diagnosis   • Inability to walk   • Elevated troponin   • Current use of long term anticoagulation   • Leukocytosis         Plan:    1. Check CT chest  2. CT head ordered by ER  3. Serial troponins. Pt with low risk for ischemia stress feb 2019  4. PT/OT consult  5. Add abx if ct chest concerning or any new  sx to suggest infection.  6. Xray left great toe      Code Status: full     I discussed the patient's findings and my recommendations with the pt and son    Estimated length of stay 2 days    Dominga Gardner MD   06/02/19   6:15 AM

## 2019-06-03 NOTE — THERAPY EVALUATION
Acute Care - Physical Therapy Initial Evaluation  [facility]     Patient Name: Karen Snell  : 1945  MRN: [id number]  Today's Date: 6/3/2019   Onset of Illness/Injury or Date of Surgery: 19  Date of Referral to PT: 19  Referring Physician: Dr. Gardner      Admit Date: 2019    Visit Dx:     ICD-10-CM ICD-9-CM   1. Inability to walk R26.2 719.7   2. Leukocytosis, unspecified type D72.829 288.60   3. Other dysphagia R13.19 787.29   4. Impaired functional mobility, balance, gait, and endurance Z74.09 V49.89   5. Decreased activities of daily living (ADL) R68.89 780.99     Patient Active Problem List   Diagnosis   • Colon polyps   • Epigastric pain   • History of DVT in adulthood   • Altered mental status   • Hernia, umbilical   • Abnormal finding on imaging   • Inability to walk   • Elevated troponin   • Current use of long term anticoagulation   • Leukocytosis   • Pneumonia due to infectious organism   • Generalized weakness     Past Medical History:   Diagnosis Date   • Arthritis    • Colon polyp    • DVT (deep venous thrombosis) (CMS/HCC)    • GERD (gastroesophageal reflux disease)    • Hypertension    • PE (pulmonary thromboembolism) (CMS/[facility])    • Sleep apnea      Past Surgical History:   Procedure Laterality Date   • CHOLECYSTECTOMY     • COLONOSCOPY  2013   • COLONOSCOPY N/A 8/10/2017    Procedure: COLONOSCOPY WITH ANESTHESIA;  Surgeon: Jenna Page MD;  Location: [facility] ENDOSCOPY;  Service:    • ENDOSCOPY N/A 8/10/2017    Procedure: ESOPHAGOGASTRODUODENOSCOPY WITH ANESTHESIA;  Surgeon: Jenna Page MD;  Location: [facility] ENDOSCOPY;  Service:    • JOINT REPLACEMENT          PT ASSESSMENT (last 12 hours)      Physical Therapy Evaluation     Row Name 19 0945          PT Evaluation Time/Intention    Subjective Information  complains of;pain;swelling Simultaneous filing. User may be unaware of other data.  -JE     Document Type  evaluation;other (see comments) see MAR  -JE      Mode of Treatment  physical therapy  -JE     Patient Effort  good  -JE     Symptoms Noted During/After Treatment  fatigue;shortness of breath  -     Comment  increase need for assistance the last week; reports previously she would get out and go to [employer] on Sundays and go out to [employer] maybe once a week; has been considering [employer] to increase her activity and provide some socialization  -     Row Name 06/03/19 0945          General Information    Patient Profile Reviewed?  yes  -JE     Onset of Illness/Injury or Date of Surgery  06/02/19  -     Referring Physician  Dr. Gardner  -     Patient Observations  alert;cooperative;agree to therapy  -JE     Patient/Family Observations  dtr in law present  -     General Observations of Patient  sitting up in bed, telemetry  -     Prior Level of Function  independent:;all household mobility;gait;transfer;bed mobility;ADL's;min assist:;community mobility;shopping;dependent:;driving;cooking cleans her room; uses power scooter to shop  -JE     Equipment Currently Used at Home  walker, rolling;cane, straight;shower chair;grab bar;wheelchair;wheelchair, motorized;commode, 3-in-1 was using rwx the last week; has reacher Simultaneous filing. User may be unaware of other data.  -JE     Pertinent History of Current Functional Problem  CC: weakness, mild confusion Dx: Elevated troponin, Pneumonia due to infectious organism, Leukocytosis  -JE     Existing Precautions/Restrictions  fall  -JE     Limitations/Impairments  other (see comments) pain  -JE     Equipment Issued to Patient  gait belt;walker, front wheeled  -JE     Risks Reviewed  patient and family:;LOB;dizziness;increased discomfort;lines disloged;other (comment) fall risk  -JE     Benefits Reviewed  patient and family:;improve function;increase independence;increase strength;increase balance;decrease risk of DVT;improve skin integrity;increase knowledge;decrease pain;other (comment) safety/falls prevention  -JE      Row Name 06/03/19 0945          Relationship/Environment    Primary Source of Support/Comfort  child(josiah)  -     Name of Support/Comfort Primary Source  son and dtr in law  -     Lives With  -- has been staying w/ son  -     Name(s) of Who Lives With Patient  son and dtr-n-law  -     Family Caregiver if Needed  child(josiah), adult  -     Row Name 06/03/19 0945          Resource/Environmental Concerns    Current Living Arrangements  home/apartment/condo  -     Row Name 06/03/19 0945          Home Main Entrance    Number of Stairs, Main Entrance  three  -     Stair Railings, Main Entrance  railing on right side (ascending)  -     Row Name 06/03/19 0945          Cognitive Assessment/Intervention- PT/OT    Orientation Status (Cognition)  oriented to;person;place did not know it was Monday, knew the year  -     Follows Commands (Cognition)  WFL  -     Safety Deficit (Cognitive)  safety precautions awareness  -     Personal Safety Interventions  fall prevention program maintained;gait belt;muscle strengthening facilitated;nonskid shoes/slippers when out of bed;supervised activity  -     Row Name 06/03/19 0945          Safety Issues, Functional Mobility    Safety Issues Affecting Function (Mobility)  friction/shear risk;safety precaution awareness  -     Impairments Affecting Function (Mobility)  balance;endurance/activity tolerance;pain;range of motion (ROM);sensation/sensory awareness;shortness of breath;strength  -     Row Name 06/03/19 0945          Bed Mobility Assessment/Treatment    Bed Mobility Assessment/Treatment  supine-sit  -     Supine-Sit [place] (Bed Mobility)  contact guard;verbal cues  -     Bed Mobility, Safety Issues  decreased use of arms for pushing/pulling;decreased use of legs for bridging/pushing  -     Assistive Device (Bed Mobility)  head of bed elevated;bed rails  -     Comment (Bed Mobility)  pt took considerable time w/ increase effort to complete task   -     Row Name 06/03/19 0945          Transfer Assessment/Treatment    Transfer Assessment/Treatment  sit-stand transfer;stand-sit transfer;toilet transfer  -     Sit-Stand [place] (Transfers)  contact guard;verbal cues  -     Stand-Sit [place] (Transfers)  contact guard;verbal cues  -     [place] Level (Toilet Transfer)  minimum assist (75% patient effort);contact guard;verbal cues  -     Row Name 06/03/19 0945          Sit-Stand Transfer    Assistive Device (Sit-Stand Transfers)  walker, front-wheeled  -     Row Name 06/03/19 0945          Stand-Sit Transfer    Assistive Device (Stand-Sit Transfers)  walker, front-wheeled  -     Row Name 06/03/19 0945          Toilet Transfer    Type (Toilet Transfer)  sit-stand;stand-sit  -     Row Name 06/03/19 0945          Gait/Stairs Assessment/Training    Gait/Stairs Assessment/Training  gait/ambulation independence;gait/ambulation assistive device;distance ambulated;gait pattern;gait deviations  -     [place] Level (Gait)  contact guard;verbal cues  -     Assistive Device (Gait)  walker, front-wheeled  -     Distance in Feet (Gait)  approx 8 ft times 2  -     Pattern (Gait)  step-through  -     Deviations/Abnormal Patterns (Gait)  base of support, wide;gait speed decreased;stride length decreased  -     Bilateral Gait Deviations  heel strike decreased;weight shift ability decreased forward head, rounded shoulders  -     Comment (Gait/Stairs)  noted shortness of breath and fatigue w/ minimal activity  -     Row Name 06/03/19 0945          General ROM    GENERAL ROM COMMENTS  AROM all 4 extremities WFLs except B shld PROM limited to approx 50% or less  -[facility] Name 06/03/19 0945          MMT (Manual Muscle Testing)    General MMT Comments  defer UE strength to OT; requires assist to lift B shlds; B hip flexors 2-/5, B knee flexors 4/5, B knee extensors 3+/5; B ankle DF/PF 4/5; assessed in seated position  -[facility]  Name 06/03/19 0945          Motor Assessment/Intervention    Additional Documentation  Balance (Group)  -     Row Name 06/03/19 0945          Balance    Balance  static sitting balance;static standing balance  -     Row Name 06/03/19 0945          Static Sitting Balance    Level of [place] (Unsupported Sitting, Static Balance)  supervision  -     Sitting Position (Unsupported Sitting, Static Balance)  sitting on edge of bed  -     Row Name 06/03/19 0945          Static Standing Balance    Level of [place] (Supported Standing, Static Balance)  contact guard assist  -     Assistive Device Utilized (Supported Standing, Static Balance)  walker, rolling  -     Row Name 06/03/19 0945          Sensory Assessment/Intervention    Sensory General Assessment  other (see comments) L hand tingling; difficulty holding to things w/ her hands  -     Row Name 06/03/19 0945          Pain Assessment    Additional Documentation  Pain Scale: Numbers Pre/Post-Treatment (Group)  -     Row Name 06/03/19 0945          Pain Scale: Numbers Pre/Post-Treatment    Pain Scale: Numbers, Pretreatment  8/10 between 8-9/10  -     Pain Scale: Numbers, Post-Treatment  5/10  -     Pain Location - Orientation  lower  -     Pain Location  back  -     Pain Intervention(s)  Medication (See MAR);Repositioned;Ambulation/increased activity;Rest  -     Row Name 06/03/19 0945          Edema Assessment & Management    Location (Edema)  lower extremity, left;lower extremity, right  -     Additional Documentation  Location (Edema) (Row)  -     Row Name 06/03/19 0945          Coping    Observed Emotional State  accepting;calm;cooperative  -     Row Name 06/03/19 0945          Plan of Care Review    Plan of Care Reviewed With  patient;caregiver  -     Row Name 06/03/19 0945          Physical Therapy Clinical Impression    Date of Referral to PT  06/02/19  -     Patient/Family Goals Statement (PT Clinical Impression)   improve strength and I w/ mobility  -     Criteria for Skilled Interventions Met (PT Clinical Impression)  yes;treatment indicated  -     Impairments Found (describe specific impairments)  aerobic capacity/endurance;gait, locomotion, and balance;muscle performance;posture;ROM;other (see comments) pain and swelling  -     Rehab Potential (PT Clinical Summary)  good, to achieve stated therapy goals  -     Predicted Duration of Therapy (PT)  util discharge or goals achieved  -     Care Plan Review (PT)  evaluation/treatment results reviewed;care plan/treatment goals reviewed;risks/benefits reviewed;current/potential barriers reviewed;patient/other agree to care plan  -     Care Plan Review, Other Participant (PT Clinical Impression)  family  -     Row Name 06/03/19 0981          Physical Therapy Goals    Bed Mobility Goal Selection (PT)  bed mobility, PT goal 1  -     Transfer Goal Selection (PT)  transfer, PT goal 1  -     Gait Training Goal Selection (PT)  gait training, PT goal 1  -     Row Name 06/03/19 0931          Bed Mobility Goal 1 (PT)    Activity/Assistive Device (Bed Mobility Goal 1, PT)  bed mobility activities, all  -     [place] Level/Cues Needed (Bed Mobility Goal 1, PT)  conditional independence  -     Time Frame (Bed Mobility Goal 1, PT)  long term goal (LTG);10 days  -     Progress/Outcomes (Bed Mobility Goal 1, PT)  goal ongoing  -     Row Name 06/03/19 0945          Transfer Goal 1 (PT)    Activity/Assistive Device (Transfer Goal 1, PT)  sit-to-stand/stand-to-sit;bed-to-chair/chair-to-bed;walker, rolling  -     [place] Level/Cues Needed (Transfer Goal 1, PT)  standby assist  -     Time Frame (Transfer Goal 1, PT)  long term goal (LTG);10 days  -     Progress/Outcome (Transfer Goal 1, PT)  goal ongoing  -     Row Name 06/03/19 0945          Gait Training Goal 1 (PT)    Activity/Assistive Device (Gait Training Goal 1, PT)  gait (walking  locomotion);assistive device use;decrease fall risk;forward stepping;backward stepping;improve balance and speed;increase endurance/gait distance;increase energy conservation;normalize weight shifts;walker, rolling  -JE     [place] Level (Gait Training Goal 1, PT)  standby assist  -JE     Distance (Gait Goal 1, PT)  50 - 75 ft w/ less than or equal to 1 standing rest  -JE     Time Frame (Gait Training Goal 1, PT)  long term goal (LTG);10 days  -JE     Progress/Outcome (Gait Training Goal 1, PT)  goal ongoing  -     Row Name 06/03/19 0945          Patient Education Goal (PT)    Activity (Patient Education Goal, PT)  activities to assist w/ edema mgmt  -JE     [place]/Cues/Accuracy (Memory Goal 2, PT)  demonstrates adequately;independent;verbalizes understanding  -     Time Frame (Patient Education Goal, PT)  long term goal (LTG);10 days  -JE     Progress/Outcome (Patient Education Goal, PT)  goal ongoing  -     Row Name 06/03/19 0945          Positioning and Restraints    Post Treatment Position  wheelchair  -     In Wheelchair  sitting;with other staff legs supported on leg rest  -     Row Name 06/03/19 0945          Living Environment    Home Accessibility  stairs to enter home  -       User Key  (r) = Recorded By, (t) = Taken By, (c) = Cosigned By    Initials Name Provider Type    Racheal Faulkner, PT Physical Therapist        Physical Therapy Education     Title: PT OT SLP Therapies (Done)     Topic: Physical Therapy (Done)     Point: Mobility training (Done)     Learning Progress Summary           Patient Acceptance, E,EVERTON,YOLIS, ZULYEKA,JOHN,NR by SAILAJA at 6/3/2019 10:47 AM    Comment:  Education re: purpose of PT and importance of activity; education for improved technique w/ bed mobility, tfers and gait w/ use of rwx.  Education to pace activities due to shortness of breath and fatigue w/ minimal effort.   Family Acceptance, E,EVERTON,YOLIS, ZULEYKA,JOHN,NR by SAILAJA at 6/3/2019 10:47 AM    Comment:  Education re:  purpose of PT and importance of activity; education for improved technique w/ bed mobility, tfers and gait w/ use of rwx.  Education to pace activities due to shortness of breath and fatigue w/ minimal effort.                   Point: Precautions (Done)     Learning Progress Summary           Patient Acceptance, E,TB,D, VU,DU,NR by SAILAJA at 6/3/2019 10:47 AM    Comment:  Education re: purpose of PT and importance of activity; education for improved technique w/ bed mobility, tfers and gait w/ use of rwx.  Education to pace activities due to shortness of breath and fatigue w/ minimal effort.   Family Acceptance, E,TB,YOLIS, ZULEYKA,JOHN,NR by SAILAJA at 6/3/2019 10:47 AM    Comment:  Education re: purpose of PT and importance of activity; education for improved technique w/ bed mobility, tfers and gait w/ use of rwx.  Education to pace activities due to shortness of breath and fatigue w/ minimal effort.                               User Key     Initials Effective Dates Name Provider Type Discipline     08/02/18 -  Racheal Dorantes, PT Physical Therapist PT              PT Recommendation and Plan  Anticipated Discharge Disposition (PT): home with assist, home with home health, other (see comments)(vs subacute care dependent on pt progress)  Planned Therapy Interventions (PT Eval): balance training, bed mobility training, gait training, home exercise program, patient/family education, postural re-education, ROM (range of motion), strengthening, transfer training, other (see comments)(safety/falls prevention)  Therapy Frequency (PT Clinical Impression): 2 times/day  Outcome Summary/Treatment Plan (PT)  Anticipated Discharge Disposition (PT): home with assist, home with home health, other (see comments)(vs subacute care dependent on pt progress)  Plan of Care Reviewed With: patient, caregiver  Progress: improving  Outcome Summary: PT eval completed.  Pt demonstrates decrease activity tolerance and generalized weakness throughout noting  shortness of breath performing minimal activity requiring considerable effort to complete tasks.  Pt demonstrates decrease balance and requires assist and use of rwx for tfers and gait.  Noted wide NAIDA and slowed gait speed w/ decrease gait mechanics, decrease balance and increase fall risk.  Pt will benefit from continued PT services to improve activity tolerance, strength, balance, safety awareness, and to assist w/ pain/edema mgmt improving I w/ functional mobility reducing fall risk.  Recommend home w/ assist and HH at discharge.  However, if pt is slow to progress w/ continue decrease in her activity tolerance, she may benefit from short stay in subacute swing bed or SNF for  continued rehab prior to returning home.  Outcome Measures     Row Name 06/03/19 1000             How much help from another is currently needed...    Putting on and taking off regular lower body clothing?  2  -CH      Bathing (including washing, rinsing, and drying)  2  -CH      Toileting (which includes using toilet bed pan or urinal)  2  -CH      Putting on and taking off regular upper body clothing  3  -CH      Taking care of personal grooming (such as brushing teeth)  4  -CH      Eating meals  4  -CH      Score  17  -CH         Functional Assessment    Outcome Measure Options  AM-PAC 6 Clicks Daily Activity (OT)  -        User Key  (r) = Recorded By, (t) = Taken By, (c) = Cosigned By    Initials Name Provider Type    [place], OTR/L Occupational Therapist         Time Calculation:   PT Charges     Row Name 06/03/19 1041             Time Calculation    Start Time  0945  -      Stop Time  1041  -      Time Calculation (min)  56 min  -      PT Received On  06/03/19  -      PT Goal Re-Cert Due Date  06/13/19  -        User Key  (r) = Recorded By, (t) = Taken By, (c) = Cosigned By    Initials Name Provider Type    Racheal Faulkner, PT Physical Therapist        Therapy Charges for Today     Code Description Service  Date Service Provider Modifiers Qty    00697043394 HC PT EVAL MOD COMPLEXITY 4 6/3/2019 Racheal Dorantes, PT GP 1          PT G-Codes  Outcome Measure Options: AM-PAC 6 Clicks Daily Activity (OT)  Score: 17      Racheal Dorantes, PT  6/3/2019

## 2019-06-03 NOTE — PROGRESS NOTES
Continued Stay Note  [facility]     Patient Name: Karen Snell  MRN: [id number]  Today's Date: 6/3/2019    Admit Date: 6/2/2019    Discharge Plan     Row Name 06/03/19 1116       Plan    Plan Comments  AGREES TO  PURCHASE [facility] PROGRAM; H/P SENT CONSENT SIGNED TAVON NOTIFIED        Discharge Codes    No documentation.             Hannah Varma RN

## 2019-06-03 NOTE — THERAPY TREATMENT NOTE
Acute Care - Speech Language Pathology   Swallow Treatment Note [facility]     Patient Name: Karen Snell  : 1945  MRN: [id number]  Today's Date: 6/3/2019  Onset of Illness/Injury or Date of Surgery: 19     Referring Physician: Dr. Gardner      Admit Date: 2019    Diet tolerance and meal observation complete by SSLP. Pt. was alert and sitting upright in her bed upon arrival. Pt.'s lunch tray had just been delivered. Pt. reported she needed assitance to sit up in her bed and move her tray to be able to eat. Once positioned in bed upright, SSLP observed pt. eating and drinking. Pt. reports she did not experience difficulty when eating her breakfast. While eating her lunch, a delayed cough was noted after taking a drink with food in oral cavity. Pt. used thin liquid wash to clear solid residue. Difficult to determine if cough was due to the solid, thin liquid, or mixed consistency. Throughout the remainder of the meal, pt. did not show any overt s/s of aspiration. SSLP educated pt. on taking small bites, using a liquid wash between bites to clear residue, and sitting in an upright position while eating and drinking. Pt. verbally agreed to continue on regular diet with thin liquids.   Tanya Tran, Speech Therapy Student 6/3/2019 3:39 PM    Visit Dx:      ICD-10-CM ICD-9-CM   1. Inability to walk R26.2 719.7   2. Leukocytosis, unspecified type D72.829 288.60   3. Other dysphagia R13.19 787.29   4. Impaired functional mobility, balance, gait, and endurance Z74.09 V49.89   5. Decreased activities of daily living (ADL) R68.89 780.99     Patient Active Problem List   Diagnosis   • Colon polyps   • Epigastric pain   • History of DVT in adulthood   • Altered mental status   • Hernia, umbilical   • Abnormal finding on imaging   • Inability to walk   • Elevated troponin   • Current use of long term anticoagulation   • Leukocytosis   • Pneumonia due to infectious organism   • Generalized weakness       Therapy  Treatment  Rehabilitation Treatment Summary     Row Name 06/03/19 1414 06/03/19 1226          Treatment Time/Intention    Discipline  physical therapy assistant  -JOANN  speech language pathologist  (Pended)   -MB     Document Type  therapy note (daily note)  -JP2  therapy note (daily note)  (Pended)   -MB     Subjective Information  no complaints  -JP2  no complaints  (Pended)   -MB     Mode of Treatment  --  speech-language pathology;individual therapy  (Pended)   -MB     Patient/Family Observations  --  Family friend and niece present  (Pended)   -MB     Care Plan Review  --  evaluation/treatment results reviewed;care plan/treatment goals reviewed;risks/benefits reviewed  (Pended)   -MB     Care Plan Review, Other Participant(s)  --  friend;family  (Pended)   -MB     Existing Precautions/Restrictions  fall  -JP2  --     Recorded by [JOANN] Sera Costa, PTA 06/03/19 1414  [JP2] Sera Costa, PTA 06/03/19 1444 [MB] Tanya Tran, Speech Therapy Student 06/03/19 1523     Row Name 06/03/19 1414             Bed Mobility Assessment/Treatment    Supine-Sit [place] (Bed Mobility)  verbal cues;contact guard  -JOANN      Recorded by [JOANN] Sera Costa, PTA 06/03/19 1444      Row Name 06/03/19 1414             Transfer Assessment/Treatment    Comment (Transfers)  only tr to BSC & BTB x2  -JOANN      Recorded by [JOANN] Sera Costa, PTA 06/03/19 1444      Row Name 06/03/19 1414             Sit-Stand Transfer    Sit-Stand [place] (Transfers)  verbal cues;contact guard  -JOANN      Recorded by [JOANN] Sera Costa, PTA 06/03/19 1444      Row Name 06/03/19 1414             Stand-Sit Transfer    Stand-Sit [place] (Transfers)  verbal cues;contact guard  -JOANN      Recorded by [JOANN] Sera Costa, PTA 06/03/19 1444      Row Name 06/03/19 1414             Toilet Transfer    Type (Toilet Transfer)  sit-stand;stand-sit  -JOANN      [place] Level (Toilet Transfer)  contact guard  -JOANN      Assistive Device  (Toilet Transfer)  commode chair  -JOANN      Recorded by [JOANN] Sera Costa, [facility] 06/03/19 1444      Row Name 06/03/19 1414             Therapeutic Exercise    Lower Extremity Range of Motion (Therapeutic Exercise)  hip flexion/extension, bilateral;hip abduction/adduction, bilateral;knee flexion/extension, bilateral;ankle dorsiflexion/plantar flexion, bilateral  -JOANN      Exercise Type (Therapeutic Exercise)  AROM (active range of motion)  -JOANN      Position (Therapeutic Exercise)  seated  -JOANN      Sets/Reps (Therapeutic Exercise)  20  -JOANN      Recorded by [JOANN] Sera Costa, [facility] 06/03/19 1444      Row Name 06/03/19 1414             Static Sitting Balance    Level of [place] (Unsupported Sitting, Static Balance)  independent  -JOANN      Sitting Position (Unsupported Sitting, Static Balance)  sitting on edge of bed  -JOANN      Recorded by [JOANN] Sera Costa, [facility] 06/03/19 1444      Row Name 06/03/19 1414             Positioning and Restraints    Pre-Treatment Position  in bed  -JOANN      Post Treatment Position  bed  -JOANN      In Bed  sitting EOB;call light within reach;encouraged to call for assist;with family/caregiver  -JOANN      Recorded by [JOANN] Sera Costa, [facility] 06/03/19 1444      Row Name 06/03/19 1226             Pain Assessment    Additional Documentation  Pain Scale: FACES Pre/Post-Treatment (Group)  (Pended)   -MB      Recorded by [MB] Tanya Tran, Speech Therapy Student 06/03/19 1523      Row Name 06/03/19 1414             Pain Scale: Numbers Pre/Post-Treatment    Pain Scale: Numbers, Pretreatment  0/10 - no pain  -JOANN      Recorded by [JOANN] Sera Costa, [facility] 06/03/19 1444      Row Name 06/03/19 1226             Pain Scale: FACES Pre/Post-Treatment    Pain: FACES Scale, Pretreatment  0-->no hurt  (Pended)   -MB      Pain: FACES Scale, Post-Treatment  0-->no hurt  (Pended)   -MB      Recorded by [MB] Tanya Tran, Speech Therapy Student 06/03/19 1523      Row Name 06/03/19 1222              Outcome Summary/Treatment Plan (SLP)    Daily Summary of Progress (SLP)  progress toward functional goals is good  (Pended)   -MB      Barriers to Overall Progress (SLP)  Overall weakness, poor appetite   (Pended)   -MB      Anticipated Dischage Disposition  home with assist  (Pended)   -MB      Recorded by [MB] Tanya Tran Speech Therapy Student 06/03/19 1523        User Key  (r) = Recorded By, (t) = Taken By, (c) = Cosigned By    Initials Name Effective Dates Discipline    JOANN Sera Costa, JOSEFINA 08/02/16 -  PT    Tanya Villasenor, Speech Therapy Student 04/24/19 -  SLP          Outcome Summary  Outcome Summary/Treatment Plan (SLP)  Daily Summary of Progress (SLP): (P) progress toward functional goals is good (06/03/19 1226 : Tanya Tran Speech Therapy Student)  Barriers to Overall Progress (SLP): (P) Overall weakness, poor appetite  (06/03/19 1226 : Tanya Tran Speech Therapy Student)  Anticipated Dischage Disposition: (P) home with assist (06/03/19 1226 : Tanya Tran Speech Therapy Student)      SLP GOALS     Row Name 06/03/19 1226 06/02/19 1045          Oral Nutrition/Hydration Goal 1 (SLP)    Oral Nutrition/Hydration Goal 1, SLP  LTG: Patient will tolerate LRD with no overt s/s of aspiration.  (Pended)   -MB  LTG: Patient will tolerate LRD with no overt s/s of aspiration.  -KW     Time Frame (Oral Nutrition/Hydration Goal 1, SLP)  short term goal (STG);by discharge  (Pended)   -MB  short term goal (STG);by discharge  -KW     Barriers (Oral Nutrition/Hydration Goal 1, SLP)  n/a  (Pended)   -MB  n/a  -KW     Progress/Outcomes (Oral Nutrition/Hydration Goal 1, SLP)  continuing progress toward goal  (Pended)   -MB  goal ongoing  -KW       User Key  (r) = Recorded By, (t) = Taken By, (c) = Cosigned By    Initials Name Provider Type    Delfina Hdez MS CCC-SLP Speech and Language Pathologist    Tanya Villasenor Speech Therapy Student Speech Therapy Student          EDUCATION  The patient  has been educated in the following areas:   Dysphagia (Swallowing Impairment).    SLP Recommendation and Plan  Daily Summary of Progress (SLP): (P) progress toward functional goals is good  Barriers to Overall Progress (SLP): (P) Overall weakness, poor appetite      Anticipated Dischage Disposition: (P) home with assist                    Time Calculation:   Time Calculation- SLP     Row Name 06/03/19 1538             Time Calculation- SLP    SLP Start Time  1226  (Pended)   -MB      SLP Stop Time  1304  (Pended)   -MB      SLP Time Calculation (min)  38 min  (Pended)   -MB      SLP Received On  06/03/19  (Pended)   -MB        User Key  (r) = Recorded By, (t) = Taken By, (c) = Cosigned By    Initials Name Provider Type    Tanya Villasenor Speech Therapy Student Speech Therapy Student          Therapy Charges for Today     Code Description Service Date Service Provider Modifiers Qty    90309895552 HC ST TREATMENT SWALLOW 3 6/3/2019 Tanya Tran Speech Therapy Student GN 1                 Tanya Tran Speech Therapy Student  6/3/2019

## 2019-06-03 NOTE — PLAN OF CARE
Problem: Patient Care Overview  Goal: Plan of Care Review  Outcome: Ongoing (interventions implemented as appropriate)  Just admitted <24hr so not much change - admits to smoking while on 02 at home - SOB upon exertion - has rested well with neurontin and melatonin - VSS on 3L 02 - will cont to monitor

## 2019-06-03 NOTE — PLAN OF CARE
Problem: Patient Care Overview  Goal: Plan of Care Review  Outcome: Ongoing (interventions implemented as appropriate)   06/03/19 1055   Coping/Psychosocial   Plan of Care Reviewed With patient   Coping/Psychosocial   Patient Agreement with Plan of Care agrees   Plan of Care Review   Progress no change   OTHER   Outcome Summary OT eval completed. Pt. reports moderate/severe back pain & generalized weakness. She demo's noted decline in bilat UE AROM, decreased act carlos manuel, & SOB during activity. She requires Max A for LBD, Mod A for clothing mgmt after toieting, & CGA - Min A for t/fs & ambulation. She would benefit from cont'd OT tx to improve her indpe in self care & fxl mobility. Anticipate DC home with 24/7 assist HH OT vs. SNF depending on her progress while hospitalized

## 2019-06-03 NOTE — THERAPY EVALUATION
Acute Care - Occupational Therapy Initial Evaluation  [facility]     Patient Name: Karen Snell  : 1945  MRN: [id number]  Today's Date: 6/3/2019  Onset of Illness/Injury or Date of Surgery: 19  Date of Referral to OT: 19  Referring Physician: Dr. Gardner    Admit Date: 2019       ICD-10-CM ICD-9-CM   1. Inability to walk R26.2 719.7   2. Leukocytosis, unspecified type D72.829 288.60   3. Other dysphagia R13.19 787.29   4. Impaired functional mobility, balance, gait, and endurance Z74.09 V49.89   5. Decreased activities of daily living (ADL) R68.89 780.99     Patient Active Problem List   Diagnosis   • Colon polyps   • Epigastric pain   • History of DVT in adulthood   • Altered mental status   • Hernia, umbilical   • Abnormal finding on imaging   • Inability to walk   • Elevated troponin   • Current use of long term anticoagulation   • Leukocytosis   • Pneumonia due to infectious organism   • Generalized weakness     Past Medical History:   Diagnosis Date   • Arthritis    • Colon polyp    • DVT (deep venous thrombosis) (CMS/HCC)    • GERD (gastroesophageal reflux disease)    • Hypertension    • PE (pulmonary thromboembolism) (CMS/[facility])    • Sleep apnea      Past Surgical History:   Procedure Laterality Date   • CHOLECYSTECTOMY     • COLONOSCOPY  2013   • COLONOSCOPY N/A 8/10/2017    Procedure: COLONOSCOPY WITH ANESTHESIA;  Surgeon: Jenna Page MD;  Location: [facility] ENDOSCOPY;  Service:    • ENDOSCOPY N/A 8/10/2017    Procedure: ESOPHAGOGASTRODUODENOSCOPY WITH ANESTHESIA;  Surgeon: Jenna Page MD;  Location: [facility] ENDOSCOPY;  Service:    • JOINT REPLACEMENT            OT ASSESSMENT FLOWSHEET (last 12 hours)      Occupational Therapy Evaluation     Row Name 19 0900                   OT Evaluation Time/Intention    Subjective Information  complains of;pain;swelling  -CH        Document Type  evaluation  -CH        Mode of Treatment  occupational therapy  -CH        Comment   completed at 0942  -           General Information    Patient Profile Reviewed?  yes  -        Onset of Illness/Injury or Date of Surgery  06/02/19  -        Referring Physician  Dr. Gardner  -        Patient Observations  alert;cooperative;agree to therapy  -        Patient/Family Observations  DTR in law present  -        General Observations of Patient  fowlers, glasses on, IV infusing,   -        Prior Level of Function  independent:;all household mobility;ADL's  -        Equipment Currently Used at Home  walker, rolling;cane, straight;grab bar;shower chair;wheelchair, motorized;wheelchair;commode, 3-in-1 reacher   -        Pertinent History of Current Functional Problem  CC: weakness, mild confusion Dx: Elevated troponin, Pneumonia due to infectious organism, Leukocytosis  -        Existing Precautions/Restrictions  fall  -        Risks Reviewed  patient and family:;LOB;increased discomfort  -        Benefits Reviewed  patient and family:;improve function;increase independence;increase strength;increase balance  -           Relationship/Environment    Primary Source of Support/Comfort  child(josiah)  -        Lives With  child(josiah), adult  -        Name(s) of Who Lives With Patient  son and DTR in law  -        Family Caregiver if Needed  child(josiah), adult  -           Resource/Environmental Concerns    Current Living Arrangements  home/apartment/condo  -           Home Main Entrance    Number of Stairs, Main Entrance  three  -        Stair Railings, Main Entrance  railing on right side (ascending)  -           Cognitive Assessment/Interventions    Additional Documentation  Cognitive Assessment/Intervention (Group)  -           Cognitive Assessment/Intervention- PT/OT    Affect/Mental Status (Cognitive)  WNL  -        Orientation Status (Cognition)  oriented to;person;time  -        Follows Commands (Cognition)  WNL  -        Personal Safety Interventions  fall  prevention program maintained;gait belt;muscle strengthening facilitated;nonskid shoes/slippers when out of bed;supervised activity  -           Bed Mobility Assessment/Treatment    Bed Mobility Assessment/Treatment  supine-sit  -        Supine-Sit [place] (Bed Mobility)  contact guard;verbal cues  -        Bed Mobility, Safety Issues  decreased use of arms for pushing/pulling;decreased use of legs for bridging/pushing  -        Assistive Device (Bed Mobility)  head of bed elevated;bed rails  -           Functional Mobility    Functional Mobility- Ind. Level  contact guard assist;minimum assist (75% patient effort);verbal cues required  -        Functional Mobility- Device  rolling walker  -        Functional Mobility- Safety Issues  step length decreased;weight-shifting ability decreased  -           Transfer Assessment/Treatment    Transfer Assessment/Treatment  sit-stand transfer;stand-sit transfer;toilet transfer  -           Sit-Stand Transfer    Sit-Stand [place] (Transfers)  contact guard;minimum assist (75% patient effort);verbal cues  -        Assistive Device (Sit-Stand Transfers)  walker, front-wheeled  -           Stand-Sit Transfer    Stand-Sit [place] (Transfers)  contact guard;minimum assist (75% patient effort);verbal cues  [facility]        Assistive Device (Stand-Sit Transfers)  walker, front-wheeled  -           Toilet Transfer    Type (Toilet Transfer)  stand pivot/stand step;sit-stand  -        [place] Level (Toilet Transfer)  minimum assist (75% patient effort);verbal cues  -        Assistive Device (Toilet Transfer)  commode;grab bars/safety frame  -           ADL Assessment/Intervention    BADL Assessment/Intervention  lower body dressing;toileting  -           Lower Body Dressing Assessment/Training    Lower Body Dressing [place] Level  maximum assist (25% patient effort);don;socks  -        Lower Body Dressing Position  edge of bed sitting   -           Toileting Assessment/Training    [place] Level (Toileting)  contact guard assist;perform perineal hygiene;moderate assist (50% patient effort);adjust/manage clothing  -        Assistive Devices (Toileting)  commode;grab bar/safety frame  -        Toileting Position  unsupported sitting;supported standing  -           BADL Safety/Performance    Impairments, BADL Safety/Performance  balance;endurance/activity tolerance;pain  -           General ROM    GENERAL ROM COMMENTS  recent decline in bilat shoulders - achieve 25% AAROM, elbows wrist & hands AROM WFL  -           MMT (Manual Muscle Testing)    General MMT Comments  bilat shoulders 2-/5, elbows wrists & hands 3+/5  -           Motor Assessment/Interventions    Additional Documentation  Balance (Group);Fine Motor Testing & Training (Group)  -           Balance    Balance  static sitting balance;static standing balance  -           Static Sitting Balance    Level of [place] (Unsupported Sitting, Static Balance)  supervision  -        Sitting Position (Unsupported Sitting, Static Balance)  sitting on edge of bed  -           Static Standing Balance    Level of [place] (Supported Standing, Static Balance)  contact guard assist  -        Assistive Device Utilized (Supported Standing, Static Balance)  walker, rolling  -           Fine Motor Testing & Training    Training Activity, Fine Motor Coordination  other (see comments)  -        Comment, Fine Motor Coordination  DTR in law reports recent decline in ability to manage pen for bill paying   -           Positioning and Restraints    Pre-Treatment Position  in bed  -        Post Treatment Position  wheelchair  -        In Wheelchair  with other staff  -           Pain Assessment    Additional Documentation  Pain Scale: Numbers Pre/Post-Treatment (Group)  -           Pain Scale: Numbers Pre/Post-Treatment    Pain Scale: Numbers, Pretreatment  8/10  -         Pain Scale: Numbers, Post-Treatment  5/10  -        Pain Location - Orientation  lower  -        Pain Location  back  -        Pain Intervention(s)  Ambulation/increased activity;Repositioned  -           Plan of Care Review    Plan of Care Reviewed With  patient  -           Clinical Impression (OT)    Date of Referral to OT  06/02/19  -        OT Diagnosis  decline in ADLs  -        Patient/Family Goals Statement (OT Eval)  return home & improve strength  -        Criteria for Skilled Therapeutic Interventions Met (OT Eval)  yes;treatment indicated  -        Rehab Potential (OT Eval)  fair, will monitor progress closely  -        Therapy Frequency (OT Eval)  5 times/wk  -        Predicted Duration of Therapy Intervention (Therapy Eval)  until DC from this facility  -        Care Plan Review (OT)  evaluation/treatment results reviewed;care plan/treatment goals reviewed;risks/benefits reviewed;current/potential barriers reviewed;patient/other agree to care plan  -        Care Plan Review, Other Participant (OT Eval)  -- DTR in law  -        Anticipated Discharge Disposition (OT)  home with /7 care;home with home health  -           Planned OT Interventions    Planned Therapy Interventions (OT Eval)  activity tolerance training;adaptive equipment training;BADL retraining;functional balance retraining;occupation/activity based interventions;patient/caregiver education/training;strengthening exercise;transfer/mobility retraining;ROM/therapeutic exercise;passive ROM/stretching;edema control/reduction  -           OT Goals    Transfer Goal Selection (OT)  transfer, OT goal 1  -CH        Dressing Goal Selection (OT)  dressing, OT goal 1  -        Toileting Goal Selection (OT)  toileting, OT goal 1  -           Transfer Goal 1 (OT)    Activity/Assistive Device (Transfer Goal 1, OT)  sit-to-stand/stand-to-sit;bed-to-chair/chair-to-bed;commode, 3-in-1;walker, rolling  -         [place] Level/Cues Needed (Transfer Goal 1, OT)  supervision required  -        Time Frame (Transfer Goal 1, OT)  long term goal (LTG);by discharge  -CH        Barriers (Transfers Goal 1, OT)  .  -CH        Progress/Outcome (Transfer Goal 1, OT)  goal ongoing  -           Dressing Goal 1 (OT)    Activity/Assistive Device (Dressing Goal 1, OT)  lower body dressing;elastic laces;long handled shoe horn;reacher;sock-aid  -        [place]/Cues Needed (Dressing Goal 1, OT)  minimum assist (75% or more patient effort);verbal cues required  -        Time Frame (Dressing Goal 1, OT)  long term goal (LTG);by discharge  -CH        Barriers (Dressing Goal 1, OT)  .  -        Progress/Outcome (Dressing Goal 1, OT)  goal ongoing  -           Toileting Goal 1 (OT)    Activity/Device (Toileting Goal 1, OT)  toileting skills, all;commode, 3-in-1  -        [place] Level/Cues Needed (Toileting Goal 1, OT)  supervision required;verbal cues required  -        Time Frame (Toileting Goal 1, OT)  long term goal (LTG);by discharge  -        Barriers (Toileting Goal 1, OT)  .  -        Progress/Outcome (Toileting Goal 1, OT)  goal ongoing  -           Living Environment    Home Accessibility  stairs to enter home walk in shower Simultaneous filing. User may be unaware of other data.  -          User Key  (r) = Recorded By, (t) = Taken By, (c) = Cosigned By    Initials Name Effective Dates     Gaviota Beck, OTR/L 08/02/16 -                OT Recommendation and Plan  Outcome Summary/Treatment Plan (OT)  Anticipated Discharge Disposition (OT): home with 24/7 care, home with home health  Planned Therapy Interventions (OT Eval): activity tolerance training, adaptive equipment training, BADL retraining, functional balance retraining, occupation/activity based interventions, patient/caregiver education/training, strengthening exercise, transfer/mobility retraining, ROM/therapeutic exercise, passive  ROM/stretching, edema control/reduction  Therapy Frequency (OT Eval): 5 times/wk  Plan of Care Review  Plan of Care Reviewed With: patient  Plan of Care Reviewed With: patient  Outcome Summary: OT eval completed.  Pt. reports moderate/severe back pain & generalized weakness.  She demo's noted decline in bilat UE AROM, decreased act carlos manuel, & SOB during activity.  She requires Max A for LBD, Mod A for clothing mgmt after toieting, & CGA - Min A for t/fs & ambulation.  She would benefit from cont'd OT tx to improve her indpe in self care & fxl mobility.  Anticipate DC home with 24/7 assist HH OT vs. SNF depending on her progress while hospitalized     Outcome Measures     Row Name 06/03/19 1000             How much help from another is currently needed...    Putting on and taking off regular lower body clothing?  2  -CH      Bathing (including washing, rinsing, and drying)  2  -CH      Toileting (which includes using toilet bed pan or urinal)  2  -CH      Putting on and taking off regular upper body clothing  3  -CH      Taking care of personal grooming (such as brushing teeth)  4  -CH      Eating meals  4  -CH      Score  17  -CH         Functional Assessment    Outcome Measure Options  AM-PAC 6 Clicks Daily Activity (OT)  -        User Key  (r) = Recorded By, (t) = Taken By, (c) = Cosigned By    Initials Name Provider Type    Gaviota Sky, OTR/L Occupational Therapist          Time Calculation:   Time Calculation- OT     Row Name 06/03/19 1054             Time Calculation- OT    OT Start Time  0945 add 10 for chart review  -      OT Stop Time  1038  -      OT Time Calculation (min)  53 min  -      OT Received On  06/03/19  -      OT Goal Re-Cert Due Date  06/13/19  -        User Key  (r) = Recorded By, (t) = Taken By, (c) = Cosigned By    Initials Name Provider Type    Gaviota Sky OTR/L Occupational Therapist        Therapy Charges for Today     Code Description Service Date Service Provider  Modifiers Qty    [id number] HC OT EVAL MOD COMPLEXITY 4 6/3/2019 Gaviota Beck, OTR/L GO 1               ROBERT Butt/TAYLOR  6/3/2019

## 2019-06-03 NOTE — PLAN OF CARE
Problem: Patient Care Overview  Goal: Plan of Care Review  Outcome: Ongoing (interventions implemented as appropriate)   06/03/19 5503   Coping/Psychosocial   Plan of Care Reviewed With patient   OTHER   Outcome Summary Pt. required CGA to tr to and from [facility]. Pt. states sudden onset f diarrhea. Performed LE ex's ctively. Will benefit from strengthening and increased activity.

## 2019-06-03 NOTE — PLAN OF CARE
Problem: Patient Care Overview  Goal: Plan of Care Review  Outcome: Ongoing (interventions implemented as appropriate)   06/03/19 6755   Coping/Psychosocial   Plan of Care Reviewed With patient;family;friend   Plan of Care Review   Progress improving   OTHER   Outcome Summary Diet tolerance and meal observation complete by SSLP. Pt. was alert and sitting upright in her bed upon arrival. Pt.'s lunch tray had just been delivered. Pt. reported she needed assitance to sit up in her bed and move her tray to be able to eat. Once positioned in bed upright, SSLP observed pt. eating and drinking. Pt. reports she did not experience difficulty when eating her breakfast. While eating her lunch, a delayed cough was noted after taking a drink with food in oral cavity. Pt. used thin liquid wash to clear solid residue. Difficult to determine if cough was due to the solid, thin liquid, or mixed consistency. Throughout the remainder of the meal, pt. did not show any overt s/s of aspiration. SSLP educated pt. on taking small bites, using a liquid wash between bites to clear residue, and sitting in an upright position while eating and drinking. Pt. verbally agreed to continue on regular diet with thin liquids.

## 2019-06-03 NOTE — PLAN OF CARE
Problem: Patient Care Overview  Goal: Plan of Care Review  Outcome: Ongoing (interventions implemented as appropriate)   06/03/19 1042   Coping/Psychosocial   Plan of Care Reviewed With patient;caregiver   Coping/Psychosocial   Patient Agreement with Plan of Care agrees   Plan of Care Review   Progress improving   OTHER   Outcome Summary PT eval completed. Pt demonstrates decrease activity tolerance and generalized weakness throughout noting shortness of breath performing minimal activity requiring considerable effort to complete tasks. Pt demonstrates decrease balance and requires assist and use of rwx for tfers and gait. Noted wide NAIDA and slowed gait speed w/ decrease gait mechanics, decrease balance and increase fall risk. Pt will benefit from continued PT services to improve activity tolerance, strength, balance, safety awareness, and to assist w/ pain/edema mgmt improving I w/ functional mobility reducing fall risk. Recommend home w/ assist and HH at discharge. However, if pt is slow to progress w/ continue decrease in her activity tolerance, she may benefit from short stay in subacute swing bed or SNF for continued rehab prior to returning home.

## 2019-06-03 NOTE — PROGRESS NOTES
[facility] Medicine Services  INPATIENT PROGRESS NOTE    Length of Stay: 1  Date of Admission: 6/2/2019  Primary Care Physician: Eamon Elizalde APRN    Subjective   Chief Complaint: Follow-up weakness  HPI   Patient is sitting up in bed eating breakfast with nephew at bedside.  She reports that she feels better today.  She still gets somewhat short of breath with exertion, but feels that this is better.  She denies chest pain.  She states that she is really not coughing much, but sometimes does get choked on water.  She denies abdominal pain, nausea, vomiting, or diarrhea.    Review of Systems   All pertinent negatives and positives are as above. All other systems have been reviewed and are negative unless otherwise stated.     Objective    Temp:  [97.7 °F (36.5 °C)-99.3 °F (37.4 °C)] 98.1 °F (36.7 °C)  Heart Rate:  [] 103  Resp:  [16-18] 16  BP: ()/(44-64) 104/54  Physical Exam   Constitutional: She is oriented to person, place, and time. She appears well-developed and well-nourished. No distress.   Obese body habitus   HENT:   Head: Normocephalic and atraumatic.   Neck: Normal range of motion. Neck supple. No JVD present. No tracheal deviation present.   Cardiovascular: Normal rate, regular rhythm, normal heart sounds and intact distal pulses. Exam reveals no gallop and no friction rub.   Sinus 85-97   Pulmonary/Chest: Effort normal. She has no wheezes. She has rales (Bibasilar faint crackles R>L).   Diminished breath sounds   Abdominal: Soft. Bowel sounds are normal. She exhibits no distension. There is no tenderness. There is no guarding.   Musculoskeletal: She exhibits edema (non-pitting moderate edema BLE) and tenderness (BLE).   Neurological: She is alert and oriented to person, place, and time. No cranial nerve deficit.   Skin: Skin is warm and dry. No rash noted. No erythema.   Psychiatric: She has a normal mood and affect. Her behavior is normal. Judgment  and thought content normal.   Vitals reviewed.    Results Review:  I have reviewed the labs, radiology results, and diagnostic studies.    Laboratory Data:   Results from last 7 days   Lab Units 06/02/19  0252   WBC 10*3/mm3 18.72*   HEMOGLOBIN g/dL 10.2*   HEMATOCRIT % 30.3*   PLATELETS 10*3/mm3 321     Results from last 7 days   Lab Units 06/02/19  0252   SODIUM mmol/L 135   POTASSIUM mmol/L 4.2   CHLORIDE mmol/L 107   CO2 mmol/L 23.0*   BUN mg/dL 33*   CREATININE mg/dL 1.07   CALCIUM mg/dL 8.2*   BILIRUBIN mg/dL 0.6   ALK PHOS U/L 104   ALT (SGPT) U/L 229*   AST (SGOT) U/L 350*   GLUCOSE mg/dL 117*     Radiology Data:   Imaging Results (last 24 hours)     Procedure Component Value Units Date/Time    XR Toe 2+ View Left [[id number]] Collected:  06/02/19 1113     Updated:  06/02/19 1117    Narrative:       EXAMINATION: XR TOE 2+ VW LEFT-     6/2/2019 10:40 AM CDT     HISTORY: wound; R26.2-Difficulty in walking, not elsewhere classified;  D72.829-Elevated white blood cell count, unspecified     Right toes, 2 views.     A discrete soft tissue defect is not seen.  There is no foreign body.     The bones are osteopenic.     There is focal erosion at the first metatarsal head which may be related  to gout or other erosive arthritis.     There is also osteoarthritic related joint disease.     No acute fracture is seen.     Summary:  1. First metatarsal head bony erosion.  2. No foreign body or fracture.  This report was finalized on 06/02/2019 11:14 by Dr. Julián Garcia MD.    XR Chest 1 View [[id number]] Collected:  06/02/19 0840     Updated:  06/02/19 0843    Narrative:       EXAMINATION: XR CHEST 1 VW-     6/2/2019 2:41 AM CDT     HISTORY: Short of breath.     1 view chest x-ray compared with 02/01/2019.     Findings heart size.  Normal mediastinum.     Full expanded lungs with chronic interstitial disease and a few  granulomas.     No focal infiltrate, pneumothorax, or heart failure.     Summary:  1. Chronic lung  changes.     This report was finalized on 06/02/2019 08:40 by Dr. Julián Garcia MD.    CT Chest Without Contrast [[id number]] Collected:  06/02/19 0814     Updated:  06/02/19 0821    Narrative:       EXAMINATION: CT CHEST WO CONTRAST-      6/2/2019 6:08 AM CDT     HISTORY: COPD exacerbation, complicated; R26.2-Difficulty in walking,  not elsewhere classified; D72.829-Elevated white blood cell count,  unspecified     In order to have a CT radiation dose as low as reasonably achievable  Automated Exposure Control was utilized for adjustment of the mA and/or  KV according to patient size.     DLP in mGycm= 265.     Noncontrast chest CT compared with 08/08/2016.     A preliminary [facility] report was faxed to the Emergency Department  immediately after this study was interpreted at 7:28 AM.     Cardiomegaly. Trace amount pericardial fluid.  No thoracic aortic aneurysm.  No mediastinal mass.     Patchy bibasilar infiltrate compatible with developing pneumonia. No  dense consolidation or pleural fluid.  No pneumothorax or heart failure.     Extensive degenerative thoracic spurring.  Stable benign-appearing 2 cm low-density lesion within the left hepatic  lobe.  Incidental mid esophageal fluid noted.     Summary:  1. Bibasilar infiltrate compatible with developing pneumonia.                                   This report was finalized on 06/02/2019 08:18 by Dr. Julián Garcia MD.    CT Head Without Contrast [[id number]] Collected:  06/02/19 0813     Updated:  06/02/19 0817    Narrative:       EXAMINATION: CT HEAD WO CONTRAST-      6/2/2019 6:08 AM CDT     HISTORY: Stroke; R26.2-Difficulty in walking, not elsewhere classified;  D72.829-Elevated white blood cell count, unspecified     In order to have a CT radiation dose as low as reasonably achievable  Automated Exposure Control was utilized for adjustment of the mA and/or  KV according to patient size.     DLP in mGycm= 540.     Noncontrast head CT compared with 05/31/2027.      A preliminary [facility] report was faxed to the Emergency Department  immediately after this study was interpreted at 7:19 AM.     Axial, sagittal, and coronal noncontrast CT imaging of the head.     The visualized paranasal sinuses are clear.     The brain and ventricles have an age appropriate appearance.   There is no hemorrhage or mass-effect.   No acute infarction is seen.     No calvarial abnormality.       Impression:       1. No acute intracranial abnormality is seen.                                         This report was finalized on 06/02/2019 08:14 by Dr. Julián Garcia MD.        I have reviewed the patient current medications.     Assessment/Plan     Active Hospital Problems    Diagnosis   • **Pneumonia due to infectious organism   • Elevated troponin   • Current use of long term anticoagulation   • Leukocytosis   • Generalized weakness     Plan:  1.  Appreciate speech therapy evaluating, they are to follow-up today with possible meal observation to ensure safety with oral intake.  Possible dysphasia study if concerns with silent aspiration.  2.  Would continue antibiotic therapy with Rocephin and azithromycin for now, day 2.  May change antibiotic therapy based on further speech studies or concerns.  3.  Discontinue IV fluids  4.  Add incentive spirometer.  Duo nebs if needed for shortness of breath.  5.  Blood cultures show no growth thus far.  Collect urinary antigens for strep and Legionella.  Respiratory culture if the patient is able to produce any sputum.  6.  Troponin demonstrates flat trend, stress test in February 2019 interpreted as low risk for ischemia.  No further cardiac work-up planned at this time.  7.  Venous Doppler ultrasound has been ordered, noted that she is on Xarelto.  8.  Physical and Occupational Therapy consults pending patient to increase activity as tolerated  9.  Encouraged elevation of lower extremities, feel that patient would likely be intolerant of compression  stockings as her legs are very tender to touch.  10.  Noted elevated transaminases yesterday, will recheck today.  These were normal in February.  Hold Lipitor for now.  11.  Labs in a.m.    Discharge Planning: I expect the patient to be discharged to home with home health in 1-2 days.    BJORN Chong   06/03/19   7:58 AM     Chart reviewed  Patient examined  Agree with assessment and plan.   Discussed with patient and JUNIOR Weber DO  06/03/19  1:12 PM

## 2019-06-03 NOTE — PLAN OF CARE
Problem: Patient Care Overview  Goal: Plan of Care Review  Outcome: Ongoing (interventions implemented as appropriate)  Just admitted <24hr - not much change - remains weak - somewhat hypotensive thru the night - w/o c/o no distress noted - up x1 with assistance to BSC/BR - will cont to monitor

## 2019-06-03 NOTE — DISCHARGE PLACEMENT REQUEST
" [facility]  MERRILL GATES RN CM [id number]      SnellKaren naranjo OYLIS (74 y.o. Female)     Date of Birth Social Security Number Address Home Phone MRN    1945  [address] [facility] [address] [phone] [id number]    Anab[employer] Marital Status          Other Single       Admission Date Admission Type Admitting Provider Attending Provider Department, Room/Bed    6/2/19 Emergency Torrie Weber DO Horn, Frances Marie, DO [facility] 4C, 462/1    Discharge Date Discharge Disposition Discharge Destination                       Attending Provider:  Torrie Weber DO    Allergies:  No Known Allergies    Isolation:  None   Infection:  None   Code Status:  CPR    Ht:  170.2 cm (67\")   Wt:  110 kg (241 lb 11.2 oz)    Admission Cmt:  None   Principal Problem:  Pneumonia due to infectious organism [J18.9]                 Active Insurance as of 6/2/2019     Primary Coverage     Payor Plan Insurance Group Employer/Plan Group    MEDICARE MEDICARE A & B      Payor Plan Address Payor Plan Phone Number Payor Plan Fax Number Effective Dates    [address] [phone]  1/1/1996 - None Entered    [facility] [address]       Subscriber Name Subscriber Birth Date Member ID       KAREN SNELL 1945 [id number]           Secondary Coverage     Payor Plan Insurance Group Employer/Plan Group    [facility] [place] [facility] [place]      Payor Plan Address Payor Plan Phone Number Payor Plan Fax Number Effective Dates    [address] [address]   1/1/2014 - None [place]tered    [place] [address]       Subscriber Name Subscriber Birth Date Member ID       KAREN SNELL 1945 [id number]                 Emergency Contacts      (Rel.) Home Phone Work Phone Mobile Phone    Leland Dickerson (Son) [phone] -- --               History & Physical      Dominga Gardner MD at 6/2/2019  6:06 AM              [facility] Medicine " Services  HISTORY AND PHYSICAL    Date of Admission: 6/2/2019  Primary Care Physician: Eamon Elizalde APRN    Subjective     Chief Complaint: generalized weakness    History of Present Illness  Patient is a 74-year-old -American female with known past history of bilateral PE, GERD, hypertension who is brought to the ER by her son for complaints of generalized weakness and just not seeming herself.  He states he is having to help her in and out of the bed which is not normal for her and states that she has been having some mild confusion.  Patient denies to me any symptoms of chest pain however does state that she has some shortness of breath with activity which is fairly normal for her however may be slightly worse than usual.  She states she has some diarrhea after eating meals but resolves after that.  She has some ongoing left-sided abdominal discomfort however she has not had a colonoscopy and EGD to evaluate this with no real findings.  She denies having any dysuria or fever.  She has been taking her blood thinner daily for diagnosis of PE about 1 year ago.  In the ER patient has a mildly elevated troponin.  Her blood pressure is marginal however it is been taking on her forearm at this time.  CT head and CT chest are being ordered and pending at this time.        Review of Systems     Otherwise complete ROS reviewed and negative except as mentioned in the HPI.    Past Medical History:   Past Medical History:   Diagnosis Date   • Arthritis    • Colon polyp    • DVT (deep venous thrombosis) (CMS/HCC)    • GERD (gastroesophageal reflux disease)    • Hypertension    • PE (pulmonary thromboembolism) (CMS/HCC)    • Sleep apnea      Past Surgical History:  Past Surgical History:   Procedure Laterality Date   • CHOLECYSTECTOMY     • COLONOSCOPY  09/24/2013   • COLONOSCOPY N/A 8/10/2017    Procedure: COLONOSCOPY WITH ANESTHESIA;  Surgeon: Jenna Page MD;  Location: [facility] ENDOSCOPY;  Service:    •  "ENDOSCOPY N/A 8/10/2017    Procedure: ESOPHAGOGASTRODUODENOSCOPY WITH ANESTHESIA;  Surgeon: Jenna Page MD;  Location: [facility] ENDOSCOPY;  Service:    • JOINT REPLACEMENT       Social History:  reports that she has quit smoking. She has never used smokeless tobacco. She reports that she does not drink alcohol or use drugs.    Family History: family history includes No Known Problems in her brother, daughter, father, maternal aunt, maternal grandmother, mother, paternal aunt, paternal grandmother, sister, and son.       Allergies:  No Known Allergies  Medications:  Prior to Admission medications    Medication Sig Start Date End Date Taking? Authorizing Provider   buPROPion XL (WELLBUTRIN XL) 300 MG 24 hr tablet Take 300 mg by mouth Daily.    Marixa Jansen MD   citalopram (CeleXA) 20 MG tablet Take 20 mg by mouth Daily.    Marixa Jansen MD   donepezil (ARICEPT) 10 MG tablet Take 10 mg by mouth Every Night.    Marixa Jansen MD   pravastatin (PRAVACHOL) 40 MG tablet Take 40 mg by mouth Daily.    Marixa Jansen MD   rivaroxaban (XARELTO) 20 MG tablet Take 20 mg by mouth Daily.    Marixa Jansen MD     Objective     Vital Signs: BP 95/51   Pulse 91   Temp 98.2 °F (36.8 °C)   Resp 20   Ht 170.2 cm (67\")   Wt 109 kg (240 lb)   SpO2 98%   BMI 37.59 kg/m²    Physical Exam   Constitutional: She is oriented to person, place, and time. She appears well-developed and well-nourished.   HENT:   Head: Normocephalic and atraumatic.   Eyes: Conjunctivae and EOM are normal. Pupils are equal, round, and reactive to light.   Neck: Neck supple. No JVD present. No thyromegaly present.   Cardiovascular: Normal rate, regular rhythm, normal heart sounds and intact distal pulses. Exam reveals no gallop and no friction rub.   No murmur heard.  Pulmonary/Chest: Effort normal and breath sounds normal. No respiratory distress. She has no wheezes. She has no rales. She exhibits no tenderness. "   Abdominal: Soft. Bowel sounds are normal. She exhibits no distension. There is no tenderness. There is no rebound and no guarding.   Musculoskeletal: Normal range of motion. She exhibits edema. She exhibits no tenderness or deformity.   Left great toe with some mild tenderness, discoloration, some bogginess   Lymphadenopathy:     She has no cervical adenopathy.   Neurological: She is alert and oriented to person, place, and time. She displays normal reflexes. No cranial nerve deficit. She exhibits normal muscle tone.   Skin: Skin is warm and dry. No rash noted.   Psychiatric: She has a normal mood and affect. Her behavior is normal. Judgment and thought content normal.           Results Reviewed:  Lab Results (last 24 hours)     Procedure Component Value Units Date/Time    Urinalysis With Culture If Indicated - Urine, Catheter [[id number]]  (Abnormal) Collected:  06/02/19 0356    Specimen:  Urine, Catheter Updated:  06/02/19 0431     Color, UA Yellow     Appearance, UA Cloudy     pH, UA <=5.0     Specific Gravity, UA 1.015     Glucose, UA Negative     Ketones, UA Negative     Bilirubin, UA Negative     Blood, UA Large (3+)     Protein, UA Trace     Leuk Esterase, UA Negative     Nitrite, UA Negative     Urobilinogen, UA 0.2 E.U./dL    Urinalysis, Microscopic Only - Urine, Catheter [[id number]]  (Abnormal) Collected:  06/02/19 0356    Specimen:  Urine, Catheter Updated:  06/02/19 0431     RBC, UA 0-2 /HPF      WBC, UA None Seen /HPF      Bacteria, UA None Seen /HPF      Squamous Epithelial Cells, UA 3-6 /HPF      Hyaline Casts, UA None Seen /LPF      Granular Casts, UA 0-2 /LPF      Amorphous Crystals, UA Small/1+ /HPF      Methodology Automated Microscopy    TSH [[id number]]  (Normal) Collected:  06/02/19 0230    Specimen:  Blood from Arm, Right Updated:  06/02/19 0359     TSH 3.820 mIU/mL     BNP [[id number]]  (Normal) Collected:  06/02/19 0252    Specimen:  Blood Updated:  06/02/19 0331     proBNP 665.0 pg/mL      Troponin [[id number]]  (Abnormal) Collected:  06/02/19 0252    Specimen:  Blood Updated:  06/02/19 0331     Troponin I 0.112 ng/mL     Comprehensive Metabolic Panel [[id number]]  (Abnormal) Collected:  06/02/19 0252    Specimen:  Blood Updated:  06/02/19 0319     Glucose 117 mg/dL      BUN 33 mg/dL      Creatinine 1.07 mg/dL      Sodium 135 mmol/L      Potassium 4.2 mmol/L      Chloride 107 mmol/L      CO2 23.0 mmol/L      Calcium 8.2 mg/dL      Total Protein 5.9 g/dL      Albumin 2.90 g/dL      ALT (SGPT) 229 U/L      AST (SGOT) 350 U/L      Alkaline Phosphatase 104 U/L      Total Bilirubin 0.6 mg/dL      eGFR   Amer 61 mL/min/1.73      Globulin 3.0 gm/dL      A/G Ratio 1.0 g/dL      BUN/Creatinine Ratio 30.8     Anion Gap 5.0 mmol/L     Narrative:       GFR Normal >60  Chronic Kidney Disease <60  Kidney Failure <15    Magnesium [[id number]]  (Normal) Collected:  06/02/19 0252    Specimen:  Blood Updated:  06/02/19 0319     Magnesium 1.7 mg/dL     D-dimer, Quantitative [[id number]]  (Abnormal) Collected:  06/02/19 0252    Specimen:  Blood Updated:  06/02/19 0318     D-Dimer, Quantitative 1.17 mg/L (FEU)     Narrative:       Reference Range is 0-0.50 mg/L FEU. However, results <0.50 mg/L FEU tends to rule out DVT or PE. Results >0.50 mg/L FEU are not useful in predicting absence or presence of DVT or PE.    Lactic Acid, Plasma [[id number]]  (Normal) Collected:  06/02/19 0252    Specimen:  Blood Updated:  06/02/19 0317     Lactate 1.4 mmol/L     CBC & Differential [[id number]] Collected:  06/02/19 0252    Specimen:  Blood Updated:  06/02/19 0308    Narrative:       The following orders were created for panel order CBC & Differential.  Procedure                               Abnormality         Status                     ---------                               -----------         ------                     CBC Auto Differential[[id number]]        Abnormal            Final result                 Please view results for  these tests on the individual orders.    CBC Auto Differential [[id number]]  (Abnormal) Collected:  06/02/19 0252    Specimen:  Blood Updated:  06/02/19 0308     WBC 18.72 10*3/mm3      RBC 3.95 10*6/mm3      Hemoglobin 10.2 g/dL      Hematocrit 30.3 %      MCV 76.7 fL      MCH 25.8 pg      MCHC 33.7 g/dL      RDW 13.7 %      RDW-SD 38.4 fl      MPV 10.0 fL      Platelets 321 10*3/mm3      Neutrophil % 81.5 %      Lymphocyte % 7.6 %      Monocyte % 3.5 %      Eosinophil % 3.0 %      Basophil % 0.3 %      Immature Grans % 4.1 %      Neutrophils, Absolute 15.26 10*3/mm3      Lymphocytes, Absolute 1.42 10*3/mm3      Monocytes, Absolute 0.66 10*3/mm3      Eosinophils, Absolute 0.56 10*3/mm3      Basophils, Absolute 0.06 10*3/mm3      Immature Grans, Absolute 0.76 10*3/mm3      nRBC 0.0 /100 WBC     Blood Culture - Blood, Arm, Right [[id number]] Collected:  06/02/19 0230    Specimen:  Blood from Arm, Right Updated:  06/02/19 0307    Blood Culture - Blood, Arm, Left [[id number]] Collected:  06/02/19 0240    Specimen:  Blood from Arm, Left Updated:  06/02/19 0307    POC Glucose Once [[id number]]  (Normal) Collected:  06/02/19 0211    Specimen:  Blood Updated:  06/02/19 0223     Glucose 113 mg/dL      Comment: : [id number] JamiMeter ID: [id number]           Imaging Results (last 24 hours)     Procedure Component Value Units Date/Time    XR Chest 1 View [[id number]] Updated:  06/02/19 0244        I have personally reviewed and interpreted the radiology studies and ECG obtained at time of admission.     Assessment / Plan     Assessment:   Active Hospital Problems    Diagnosis   • Inability to walk   • Elevated troponin   • Current use of long term anticoagulation   • Leukocytosis         Plan:    1. Check CT chest  2. CT head ordered by ER  3. Serial troponins. Pt with low risk for ischemia stress feb 2019  4. PT/OT consult  5. Add abx if ct chest concerning or any new sx to suggest infection.  6. Xray left great  toe      Code Status: full     I discussed the patient's findings and my recommendations with the pt and son    Estimated length of stay 2 days    Dominga Gardner MD   06/02/19   6:15 AM            Electronically signed by Dominga Gardner MD at 6/2/2019  6:27 AM

## 2019-06-04 PROBLEM — R74.01 ELEVATED TRANSAMINASE LEVEL: Status: ACTIVE | Noted: 2019-01-01

## 2019-06-04 NOTE — THERAPY TREATMENT NOTE
Acute Care - Speech Language Pathology   Swallow Treatment Note [facility]     Patient Name: Karen Snell  : 1945  MRN: [id number]  Today's Date: 2019  Onset of Illness/Injury or Date of Surgery: 19     Referring Physician: Dr. Gardner      Admit Date: 2019  Pt completed trials of regular solids and thin liquids. She had prolonged mastication of the regular solid and required a thin liquid wash to aide with clearance of oral residue. She had a 1x immediate cough when taking a sip of thin water to help with residue clearance. No immediate coughing was observed with thin via cup. Recommend continuing regular solids and thin liquids. Avoid straws and encourage pt to drink from cup. SLP will follow up.  Aroldo Patel CCC-SLP 2019 9:20 AM    Visit Dx:      ICD-10-CM ICD-9-CM   1. Inability to walk R26.2 719.7   2. Leukocytosis, unspecified type D72.829 288.60   3. Other dysphagia R13.19 787.29   4. Impaired functional mobility, balance, gait, and endurance Z74.09 V49.89   5. Decreased activities of daily living (ADL) R68.89 780.99     Patient Active Problem List   Diagnosis   • Colon polyps   • Epigastric pain   • History of DVT in adulthood   • Altered mental status   • Hernia, umbilical   • Abnormal finding on imaging   • Inability to walk   • Elevated troponin   • Current use of long term anticoagulation   • Leukocytosis   • Pneumonia due to infectious organism   • Generalized weakness       Therapy Treatment  Rehabilitation Treatment Summary     Row Name 19 0904 19 0840          Treatment Time/Intention    Discipline  speech language pathologist  -MB  physical therapy assistant  -KJ     Document Type  therapy note (daily note)  -MB  therapy note (daily note)  -KJ     Subjective Information  complains of poor appetite  -MB  complains of;weakness;pain  -KJ     Mode of Treatment  speech-language pathology  -MB  physical therapy  -KJ     Patient/Family Observations  No family  present  -MB  --     Patient Effort  good  -MB  good  -KJ     Existing Precautions/Restrictions  --  fall  -KJ     Recorded by [MB] Aroldo Patel, CCC-SLP 06/04/19 0916 [KJ] Hannah Paul, [facility] 06/04/19 0906     Row Name 06/04/19 0840             Vital Signs    Intratreatment Heart Rate (beats/min)  107  -KJ      O2 Delivery Pre Treatment  room air  -KJ      Intra SpO2 (%)  95  -KJ      Recorded by [KJ] Hannah Paul, [facility] 06/04/19 0906      Row Name 06/04/19 0840             Bed Mobility Assessment/Treatment    Comment (Bed Mobility)  left pt sitting edge of bed  -KJ      Recorded by [KJ] Hannah Paul, [facility] 06/04/19 0906      Row Name 06/04/19 0840             Sit-Stand Transfer    Sit-Stand [place] (Transfers)  verbal cues;minimum assist (75% patient effort)  -KJ      Assistive Device (Sit-Stand Transfers)  walker, front-wheeled  -KJ      Recorded by [KJ] Hannah Paul, [facility] 06/04/19 0906      Row Name 06/04/19 0840             Stand-Sit Transfer    Stand-Sit [place] (Transfers)  verbal cues;contact guard;minimum assist (75% patient effort)  -KJ      Assistive Device (Stand-Sit Transfers)  walker, front-wheeled  -KJ      Recorded by [KJ] Hannah Paul, [facility] 06/04/19 0906      Row Name 06/04/19 0840             Gait/Stairs Assessment/Training    Gait/Stairs Assessment/Training  gait/ambulation independence  -KJ      [place] Level (Gait)  verbal cues;contact guard  -KJ      Assistive Device (Gait)  walker, front-wheeled  -KJ      Distance in Feet (Gait)  40' sitting rest 40' back to room  -KJ      Pattern (Gait)  step-through  -KJ      Deviations/Abnormal Patterns (Gait)  stride length decreased;corinne decreased  -KJ      Bilateral Gait Deviations  forward flexed posture  -KJ      Comment (Gait/Stairs)  followed with chair for safety  -KJ      Recorded by [KJ] Hannah Paul, [facility] 06/04/19 0906      Row Name 06/04/19 0840             Therapeutic Exercise    Exercise Type (Therapeutic  Exercise)  AROM (active range of motion);AAROM (active assistive range of motion)  -KJ      Position (Therapeutic Exercise)  seated  -KJ      Sets/Reps (Therapeutic Exercise)  10-15  -KJ      Recorded by [KJ] Hannah Paul, [facility] 06/04/19 0906      Row Name 06/04/19 0840             Positioning and Restraints    Pre-Treatment Position  sitting in chair/recliner  -KJ      Post Treatment Position  bed sitting edge of bed  -KJ      Recorded by [KJ] Hannah Paul, [facility] 06/04/19 0906      Row Name 06/04/19 0840             Pain Scale: Numbers Pre/Post-Treatment    Pain Scale: Numbers, Pretreatment  5/10  -KJ      Pain Scale: Numbers, Post-Treatment  5/10  -KJ      Pain Location  back left leg  -KJ      Recorded by [KJ] Hannah Paul, [facility] 06/04/19 0906      Row Name 06/04/19 0904             Pain Scale: FACES Pre/Post-Treatment    Pain: FACES Scale, Pretreatment  0-->no hurt  -MB      Recorded by [MB] Aroldo Patel CCC-SLP 06/04/19 0916      Row Name 06/04/19 0904             Outcome Summary/Treatment Plan (SLP)    Daily Summary of Progress (SLP)  progress toward functional goals is good  -MB      Barriers to Overall Progress (SLP)  Poor appetite  -MB      Plan for Continued Treatment (SLP)  Continue regular solids and thin liquids, avoid straws  -MB      Anticipated Dischage Disposition  home with assist  -MB      Recorded by [MB] Aroldo Patel CCC-SLP 06/04/19 0916        User Key  (r) = Recorded By, (t) = Taken By, (c) = Cosigned By    Initials Name Effective Dates Discipline    MB Aroldo Patel CCC-SLP 08/02/16 -  SLP    KJ Hannah Paul, [facility] 08/02/16 -  PT          Outcome Summary  Outcome Summary/Treatment Plan (SLP)  Daily Summary of Progress (SLP): progress toward functional goals is good (06/04/19 0904 : Aroldo Patel CCC-SLP)  Barriers to Overall Progress (SLP): Poor appetite (06/04/19 0904 : Aroldo Patel CCC-SLP)  Plan for Continued Treatment (SLP): Continue regular solids  and thin liquids, avoid straws (06/04/19 0904 : Aroldo Patel, CCC-SLP)  Anticipated Dischage Disposition: home with assist (06/04/19 0904 : Aroldo Patel, CCC-SLP)      SLP GOALS     Row Name 06/04/19 0904 06/03/19 1226 06/02/19 1045       Oral Nutrition/Hydration Goal 1 (SLP)    Oral Nutrition/Hydration Goal 1, SLP  LTG: Patient will tolerate LRD with no overt s/s of aspiration.  -MB  LTG: Patient will tolerate LRD with no overt s/s of aspiration.  -CS (r) GEOFF (t) CS (c)  LTG: Patient will tolerate LRD with no overt s/s of aspiration.  -KW    Time Frame (Oral Nutrition/Hydration Goal 1, SLP)  short term goal (STG);by discharge  -MB  short term goal (STG);by discharge  -CS (r) GEOFF (t) CS (c)  short term goal (STG);by discharge  -KW    Barriers (Oral Nutrition/Hydration Goal 1, SLP)  poor appetite  -MB  n/a  -CS (r) GEOFF (t) CS (c)  n/a  -KW    Progress/Outcomes (Oral Nutrition/Hydration Goal 1, SLP)  continuing progress toward goal  -MB  continuing progress toward goal  -CS (r) GEOFF (t) CS (c)  goal ongoing  -KW      User Key  (r) = Recorded By, (t) = Taken By, (c) = Cosigned By    Initials Name Provider Type    Aroldo Cloud, CCC-SLP Speech and Language Pathologist    Delfina Hdez, MS CCC-SLP Speech and Language Pathologist    Jose Angel Palma, MS CCC-SLP Speech and Language Pathologist    Tanya Kinsey, Speech Therapy Student Speech Therapy Student          EDUCATION  The patient has been educated in the following areas:   Dysphagia (Swallowing Impairment).    SLP Recommendation and Plan  Daily Summary of Progress (SLP): progress toward functional goals is good  Barriers to Overall Progress (SLP): Poor appetite  Plan for Continued Treatment (SLP): Continue regular solids and thin liquids, avoid straws  Anticipated Dischage Disposition: home with assist                    Time Calculation:   Time Calculation- SLP     Row Name 06/04/19 0919             Time Calculation- SLP    SLP Start  Time  0904  -MB      SLP Stop Time  0913  -MB      SLP Time Calculation (min)  9 min  -MB      SLP Received On  06/04/19  -MB        User Key  (r) = Recorded By, (t) = Taken By, (c) = Cosigned By    Initials Name Provider Type    Aroldo Cloud CCC-SLP Speech and Language Pathologist          Therapy Charges for Today     Code Description Service Date Service Provider Modifiers Qty    38264048057 HC ST TREATMENT SWALLOW 1 6/4/2019 Aroldo Patel CCC-SLP GN 1                 RICK Eduardo  6/4/2019

## 2019-06-04 NOTE — PLAN OF CARE
Problem: Patient Care Overview  Goal: Plan of Care Review  Outcome: Ongoing (interventions implemented as appropriate)   06/04/19 6198   Coping/Psychosocial   Plan of Care Reviewed With patient   Plan of Care Review   Progress no change   OTHER   Outcome Summary Pt completed trials of regular solids and thin liquids. She had prolonged mastication of the regular solid and required a thin liquid wash to aide with clearance of oral residue. She had a 1x immediate cough when taking a sip of thin water to help with residue clearance. No immediate coughing was observed with thin via cup. Recommend continuing regular solids and thin liquids. Avoid straws and encourage pt to drink from cup. SLP will follow up.

## 2019-06-04 NOTE — PLAN OF CARE
Problem: Fall Risk (Adult)  Goal: Identify Related Risk Factors and Signs and Symptoms  Outcome: Ongoing (interventions implemented as appropriate)    Goal: Absence of Fall  Outcome: Ongoing (interventions implemented as appropriate)      Problem: Patient Care Overview  Goal: Plan of Care Review  Outcome: Ongoing (interventions implemented as appropriate)   06/04/19 0419   Coping/Psychosocial   Plan of Care Reviewed With patient   Plan of Care Review   Progress improving   OTHER   Outcome Summary Patient c/o weakness. Incontinent of urine due to difficulty getting OOB quickly. Wearing brief for incontinence. 4+ BLE edema. US venous doppler bilateral lower extremities negative. IV ABX continued. Xarelto continued. Liver enzymes elevated, WBC elevated. Continue to monitor. D/c home with son.      Goal: Individualization and Mutuality  Outcome: Ongoing (interventions implemented as appropriate)    Goal: Discharge Needs Assessment  Outcome: Ongoing (interventions implemented as appropriate)    Goal: Interprofessional Rounds/Family Conf  Outcome: Ongoing (interventions implemented as appropriate)      Problem: Pneumonia (Adult)  Goal: Signs and Symptoms of Listed Potential Problems Will be Absent, Minimized or Managed (Pneumonia)  Outcome: Ongoing (interventions implemented as appropriate)

## 2019-06-04 NOTE — PLAN OF CARE
Problem: Patient Care Overview  Goal: Plan of Care Review  Outcome: Ongoing (interventions implemented as appropriate)   06/04/19 0909   Coping/Psychosocial   Plan of Care Reviewed With patient   Plan of Care Review   Progress improving   OTHER   Outcome Summary PT tx completed. Pt sitting in chair. C/O low back pn and left LE pain. Rates 5/10. [place] sit<>stand, amb 40' x 2 with r wx and 1 sitting rest. CG/Min A for gait. Decreased strength and endurance. Recommend home health at time of discharge.

## 2019-06-04 NOTE — THERAPY TREATMENT NOTE
Acute Care - Physical Therapy Treatment Note  [facility]     Patient Name: Karen Snell  : 1945  MRN: [id number]  Today's Date: 2019  Onset of Illness/Injury or Date of Surgery: 19  Date of Referral to PT: 19  Referring Physician: Dr. Gardner    Admit Date: 2019    Visit Dx:    ICD-10-CM ICD-9-CM   1. Inability to walk R26.2 719.7   2. Leukocytosis, unspecified type D72.829 288.60   3. Other dysphagia R13.19 787.29   4. Impaired functional mobility, balance, gait, and endurance Z74.09 V49.89   5. Decreased activities of daily living (ADL) R68.89 780.99     Patient Active Problem List   Diagnosis   • Colon polyps   • Epigastric pain   • History of DVT in adulthood   • Altered mental status   • Hernia, umbilical   • Abnormal finding on imaging   • Inability to walk   • Elevated troponin   • Current use of long term anticoagulation   • Leukocytosis   • Pneumonia due to infectious organism   • Generalized weakness       Therapy Treatment    Rehabilitation Treatment Summary     Row Name 19 0840             Treatment Time/Intention    Discipline  physical therapy assistant  -KJ      Document Type  therapy note (daily note)  -KJ      Subjective Information  complains of;weakness;pain  -KJ      Mode of Treatment  physical therapy  -KJ      Patient Effort  good  -KJ      Existing Precautions/Restrictions  fall  -KJ      Recorded by [KJ] Hannah Paul PTA 19      Row Name 19 0840             Vital Signs    Intratreatment Heart Rate (beats/min)  107  -KJ      O2 Delivery Pre Treatment  room air  -KJ      Intra SpO2 (%)  95  -KJ      Recorded by [KJ] Hannah Paul PTA 19      Row Name 19 0840             Bed Mobility Assessment/Treatment    Comment (Bed Mobility)  left pt sitting edge of bed  -KJ      Recorded by [KJ] Hannah Paul PTA 19      Row Name 19 0840             Sit-Stand Transfer    Sit-Stand [place] (Transfers)  verbal  cues;minimum assist (75% patient effort)  -KJ      Assistive Device (Sit-Stand Transfers)  walker, front-wheeled  -KJ      Recorded by [KJ] Hannah Paul, [facility] 06/04/19 0906      Row Name 06/04/19 0840             Stand-Sit Transfer    Stand-Sit [place] (Transfers)  verbal cues;contact guard;minimum assist (75% patient effort)  -KJ      Assistive Device (Stand-Sit Transfers)  walker, front-wheeled  -KJ      Recorded by [KJ] Hannah Paul, [facility] 06/04/19 0906      Row Name 06/04/19 0840             Gait/Stairs Assessment/Training    Gait/Stairs Assessment/Training  gait/ambulation independence  -KJ      [place] Level (Gait)  verbal cues;contact guard  -KJ      Assistive Device (Gait)  walker, front-wheeled  -KJ      Distance in Feet (Gait)  40' sitting rest 40' back to room  -KJ      Pattern (Gait)  step-through  -KJ      Deviations/Abnormal Patterns (Gait)  stride length decreased;corinne decreased  -KJ      Bilateral Gait Deviations  forward flexed posture  -KJ      Comment (Gait/Stairs)  followed with chair for safety  -KJ      Recorded by [KJ] Hannah Paul, [facility] 06/04/19 0906      Row Name 06/04/19 0840             Therapeutic Exercise    Exercise Type (Therapeutic Exercise)  AROM (active range of motion);AAROM (active assistive range of motion)  -KJ      Position (Therapeutic Exercise)  seated  -KJ      Sets/Reps (Therapeutic Exercise)  10-15  -KJ      Recorded by [KJ] Hannah Paul, [facility] 06/04/19 0906      Row Name 06/04/19 0840             Positioning and Restraints    Pre-Treatment Position  sitting in chair/recliner  -KJ      Post Treatment Position  bed sitting edge of bed  -KJ      Recorded by [KJ] Hannah Paul, [facility] 06/04/19 0906      Row Name 06/04/19 0840             Pain Scale: Numbers Pre/Post-Treatment    Pain Scale: Numbers, Pretreatment  5/10  -KJ      Pain Scale: Numbers, Post-Treatment  5/10  -KJ      Pain Location  back left leg  -KJ      Recorded by [KJ] Hannah Paul, [facility]  06/04/19 0906        User Key  (r) = Recorded By, (t) = Taken By, (c) = Cosigned By    Initials Name Effective Dates Discipline    EMMETT PaulHannah TAYLOR, JOSEFINA 08/02/16 -  PT                   Physical Therapy Education     Title: PT OT SLP Therapies (Done)     Topic: Physical Therapy (Done)     Point: Mobility training (Done)     Learning Progress Summary           Patient Acceptance, E,TB,D, VU,DU,NR by  at 6/3/2019 10:47 AM    Comment:  Education re: purpose of PT and importance of activity; education for improved technique w/ bed mobility, tfers and gait w/ use of rwx.  Education to pace activities due to shortness of breath and fatigue w/ minimal effort.   Family Acceptance, E,TB,D, VU,DU,NR by  at 6/3/2019 10:47 AM    Comment:  Education re: purpose of PT and importance of activity; education for improved technique w/ bed mobility, tfers and gait w/ use of rwx.  Education to pace activities due to shortness of breath and fatigue w/ minimal effort.                   Point: Precautions (Done)     Learning Progress Summary           Patient Acceptance, E,TB,D, VU,DU,NR by  at 6/3/2019 10:47 AM    Comment:  Education re: purpose of PT and importance of activity; education for improved technique w/ bed mobility, tfers and gait w/ use of rwx.  Education to pace activities due to shortness of breath and fatigue w/ minimal effort.   Family Acceptance, E,TB,D, VU,DU,NR by  at 6/3/2019 10:47 AM    Comment:  Education re: purpose of PT and importance of activity; education for improved technique w/ bed mobility, tfers and gait w/ use of rwx.  Education to pace activities due to shortness of breath and fatigue w/ minimal effort.                               User Key     Initials Effective Dates Name Provider Type Discipline     08/02/18 -  Racheal Dorantes, PT Physical Therapist PT                PT Recommendation and Plan     Plan of Care Reviewed With: patient  Progress: improving  Outcome Summary: PT tx  completed. Pt sitting in chair. C/O low back pn and left LE pain. Rates 5/10. [place] sit<>stand, amb 40' x 2 with r wx and 1 sitting rest. CG/Min A for gait. Decreased strength and endurance. Recommend home health at time of discharge.  Outcome Measures     Row Name 06/03/19 1000             How much help from another is currently needed...    Putting on and taking off regular lower body clothing?  2  -CH      Bathing (including washing, rinsing, and drying)  2  -CH      Toileting (which includes using toilet bed pan or urinal)  2  -CH      Putting on and taking off regular upper body clothing  3  -CH      Taking care of personal grooming (such as brushing teeth)  4  -CH      Eating meals  4  -CH      Score  17  -         Functional Assessment    Outcome Measure Options  AM-PAC 6 Clicks Daily Activity (OT)  -CH        User Key  (r) = Recorded By, (t) = Taken By, (c) = Cosigned By    Initials Name Provider Type     Gaviota Beck, OTR/L Occupational Therapist         Time Calculation:   PT Charges     Row Name 06/04/19 0906             Time Calculation    Start Time  0840  -KJ      Stop Time  0904  -KJ      Time Calculation (min)  24 min  -KJ      PT Received On  06/04/19  -KJ      PT Goal Re-Cert Due Date  06/13/19  -KJ         Time Calculation- PT    Total Timed Code Minutes- PT  24 minute(s)  -KJ        User Key  (r) = Recorded By, (t) = Taken By, (c) = Cosigned By    Initials Name Provider Type    Hannah De Oliveira, JOSEFINA Physical Therapy Assistant        Therapy Charges for Today     Code Description Service Date Service Provider Modifiers Qty    61829385348 HC GAIT TRAINING EA 15 MIN 6/4/2019 Hannah Paul, JOSEFINA GP 1    73090215362 HC PT THER PROC EA 15 MIN 6/4/2019 Hannah Paul, PTA GP 1          PT G-Codes  Outcome Measure Options: AM-PAC 6 Clicks Daily Activity (OT)  Score: 17    Hannah Paul PTA  6/4/2019

## 2019-06-04 NOTE — PLAN OF CARE
Problem: Fall Risk (Adult)  Goal: Identify Related Risk Factors and Signs and Symptoms  Outcome: Ongoing (interventions implemented as appropriate)    Goal: Absence of Fall  Outcome: Ongoing (interventions implemented as appropriate)      Problem: Patient Care Overview  Goal: Plan of Care Review  Outcome: Ongoing (interventions implemented as appropriate)   06/04/19 0917 06/04/19 1738   Coping/Psychosocial   Plan of Care Reviewed With patient --    Plan of Care Review   Progress no change --    OTHER   Outcome Summary --  Change to oral antibiotic. feeling better. lung mostly clear. currently on room air. encourage to keep legs elevated       Problem: Pneumonia (Adult)  Goal: Signs and Symptoms of Listed Potential Problems Will be Absent, Minimized or Managed (Pneumonia)  Outcome: Ongoing (interventions implemented as appropriate)

## 2019-06-04 NOTE — PROGRESS NOTES
[facility] Medicine Services  INPATIENT PROGRESS NOTE    Length of Stay: 2  Date of Admission: 6/2/2019  Primary Care Physician: Eamon Elizalde APRN    Subjective   Chief Complaint: Follow-up weakness  HPI   Patient is sitting up on the side of the bed with daughter-in-law at bedside.  She reports that she feels okay today, but is still short of breath with exertion.  She feels tired.  She denies any cough.  She does have some pain to the left flank, worse with deep inspiration.    Review of Systems   All pertinent negatives and positives are as above. All other systems have been reviewed and are negative unless otherwise stated.     Objective    Temp:  [97.9 °F (36.6 °C)-99.8 °F (37.7 °C)] 98.8 °F (37.1 °C)  Heart Rate:  [] 105  Resp:  [16-18] 16  BP: ()/(50-65) 112/63  Physical Exam  Constitutional: She is oriented to person, place, and time. She appears well-developed and well-nourished. No distress.   Obese body habitus   HENT:   Head: Normocephalic and atraumatic.   Neck: Normal range of motion. Neck supple. No JVD present. No tracheal deviation present.   Cardiovascular: Normal rate, regular rhythm, normal heart sounds and intact distal pulses. Exam reveals no gallop and no friction rub.   Sinus-sinus tach    Pulmonary/Chest: Effort normal. She has no wheezes. She has rales (Bibasilar faint crackles R>L).   Diminished breath sounds   Abdominal: Soft. Bowel sounds are normal. She exhibits no distension. There is no tenderness. There is no guarding.   Musculoskeletal: She exhibits edema (non-pitting moderate edema BLE) and tenderness (BLE).   Neurological: She is alert and oriented to person, place, and time. No cranial nerve deficit.   Skin: Skin is warm and dry. No rash noted. No erythema.   Psychiatric: She has a normal mood and affect. Her behavior is normal. Judgment and thought content normal.   Vitals reviewed    Results Review:  I have reviewed  the labs, radiology results, and diagnostic studies.    Laboratory Data:   Results from last 7 days   Lab Units 06/04/19  0535 06/03/19  0754 06/02/19  0252   WBC 10*3/mm3 17.69* 17.02* 18.72*   HEMOGLOBIN g/dL 9.7* 10.2* 10.2*   HEMATOCRIT % 29.1* 31.0* 30.3*   PLATELETS 10*3/mm3 298 308 321     Results from last 7 days   Lab Units 06/04/19  0535 06/03/19  0754 06/02/19  0252   SODIUM mmol/L 137 136  136 135   POTASSIUM mmol/L 4.1 4.3  4.3 4.2   CHLORIDE mmol/L 109 109  109 107   CO2 mmol/L 24.0 24.0  24.0 23.0*   BUN mg/dL 25* 26*  26* 33*   CREATININE mg/dL 0.98 0.95  0.95 1.07   CALCIUM mg/dL 8.0* 7.9*  7.9* 8.2*   BILIRUBIN mg/dL 0.5 0.6 0.6   ALK PHOS U/L 93 96 104   ALT (SGPT) U/L 206* 198* 229*   AST (SGOT) U/L 320* 283* 350*   GLUCOSE mg/dL 90 87  87 117*     Radiology Data:   Imaging Results (last 24 hours)     Procedure Component Value Units Date/Time    US Venous Doppler Lower Extremity Bilateral (duplex) [[id number]] Collected:  06/03/19 1809     Updated:  06/03/19 1813    Narrative:       History: Swelling       Impression:       Impression: There is no evidence of deep venous thrombosis or  superficial thrombophlebitis of right or left lower extremities.     Comments: Bilateral lower extremity venous duplex exam was performed  using color Doppler flow, Doppler waveform analysis, and grayscale  imaging, with and without compression. There is no evidence of deep  venous thrombosis in the common femoral, superficial femoral, and  popliteal veins bilaterally. The tibial veins were not visualized  bilaterally due to body habitus. No thrombus is identified in the  saphenofemoral junctions and greater saphenous veins bilaterally.         This report was finalized on 06/03/2019 18:09 by Dr. Eriberto Leonard MD.    XR Chest PA & Lateral [[id number]] Collected:  06/03/19 1108     Updated:  06/03/19 1113    Narrative:       EXAMINATION:  XR CHEST PA AND LATERAL-  6/3/2019 10:54 AM CDT     HISTORY:  Shortness of air. R26.2-Difficulty in walking, not elsewhere  classified; D72.829-Elevated white blood cell count, unspecified;  R13.19-Other dysphagia; Z74.09-Other reduced mobility; R68.89-Other  general symptoms and signs.     COMPARISON: 06/02/2019.     TECHNIQUE: 2 views were obtained.     FINDINGS:  There is hazy density in the right lung base. There is mild  blunting of the right costophrenic angle. The left lung is essentially  clear. There is cardiomegaly. There are degenerative changes of the  spine and shoulders.       Impression:       1. Minimal hazy infiltrate at the right lung base. Mild blunting of the  right costophrenic angle may represent minimal fluid.  2. Cardiomegaly.         This report was finalized on 06/03/2019 11:10 by Dr. Terry Rivera MD.        I have reviewed the patient current medications.     Assessment/Plan     Active Hospital Problems    Diagnosis   • **Pneumonia due to infectious organism   • Elevated transaminase level   • Elevated troponin   • Current use of long term anticoagulation   • Leukocytosis   • Generalized weakness     Plan:  1.  Continue azithromycin, today will complete 3 days.  Continue Rocephin, day 3.  2.  DuoNeb's as needed for shortness of breath, encouraged incentive spirometer use.  Add Lidoderm patch to the left flank for pain  3.  Noted elevated transaminases, continue to hold atorvastatin.  A CT of the abdomen and pelvis performed in February of this year shows a 2.1 cm left hepatic lobe cyst.  Patient does not relate any right upper quadrant pain, nausea, vomiting, or diarrhea.  Could consider dedicated liver ultrasound if felt necessary, would hold off at this time and continue to monitor while hospitalized.  4.  New physical and occupational therapy.   following for discharge, patient is agreeable to home health services.  5.  Appreciate speech therapy.  No overt signs or symptoms of aspiration noted.  6.  Labs in AM    Discharge  Planning: I expect the patient to be discharged to home with home health in 1-2 days.    BJORN Chong   06/04/19   10:07 AM      Chart reviewed..  Patient examined.  Agree with assessment and plan.  Discussed with patient and with BENJY MILAN.    Torrie Weber DO  06/04/19  10:29 AM

## 2019-06-05 NOTE — PROGRESS NOTES
[facility] Medicine Services  INPATIENT PROGRESS NOTE    Length of Stay: 3  Date of Admission: 6/2/2019  Primary Care Physician: Eamon Elizalde APRN    Subjective   Chief Complaint: Follow-up  HPI   Patient is sitting up in the chair eating lunch.  She states she feels okay today, still feels tired.  She still gets short of breath with exertion.  She denies any cough.  She has pain to the left flank worse with deep inspiration.  She denies abdominal pain, nausea, vomiting, or diarrhea.  She does complain of difficulty with raising her arms at the level of the shoulder.    Review of Systems   All pertinent negatives and positives are as above. All other systems have been reviewed and are negative unless otherwise stated.     Objective    Temp:  [97.7 °F (36.5 °C)-98.2 °F (36.8 °C)] 98.2 °F (36.8 °C)  Heart Rate:  [] 91  Resp:  [16-18] 18  BP: ()/(40-78) 100/78  Physical Exam  Constitutional: She is oriented to person, place, and time. She appears well-developed and well-nourished. No distress.   Obese body habitus   HENT:   Head: Normocephalic and atraumatic.   Neck: Normal range of motion. Neck supple. No JVD present. No tracheal deviation present.   Cardiovascular: Normal rate, regular rhythm, normal heart sounds and intact distal pulses. Exam reveals no gallop and no friction rub.   Sinus-sinus tach  with PACs and PVCs.  She did have a brief run of SVT today while ambulating with physical therapy.  Pulmonary/Chest: Effort normal. She has no wheezes. She has no rales.   Diminished breath sounds   Abdominal: Soft. Bowel sounds are normal. She exhibits no distension. There is no tenderness. There is no guarding.   Musculoskeletal: She exhibits edema (non-pitting moderate edema BLE) and tenderness (BLE).   Neurological: She is alert and oriented to person, place, and time. No cranial nerve deficit.   Skin: Skin is warm and dry. No rash noted. No erythema.    Psychiatric: She has a normal mood and affect. Her behavior is normal. Judgment and thought content normal.   Vitals reviewed     Results Review:  I have reviewed the labs, radiology results, and diagnostic studies.    Laboratory Data:   Results from last 7 days   Lab Units 06/05/19  0553 06/04/19  0535 06/03/19  0754   WBC 10*3/mm3 18.46* 17.69* 17.02*   HEMOGLOBIN g/dL 10.0* 9.7* 10.2*   HEMATOCRIT % 29.3* 29.1* 31.0*   PLATELETS 10*3/mm3 323 298 308      Results from last 7 days   Lab Units 06/05/19  0553 06/04/19  0535 06/03/19  0754   SODIUM mmol/L 137 137 136  136   POTASSIUM mmol/L 4.3 4.1 4.3  4.3   CHLORIDE mmol/L 109 109 109  109   CO2 mmol/L 24.0 24.0 24.0  24.0   BUN mg/dL 27* 25* 26*  26*   CREATININE mg/dL 0.97 0.98 0.95  0.95   CALCIUM mg/dL 8.0* 8.0* 7.9*  7.9*   BILIRUBIN mg/dL 0.5 0.5 0.6   ALK PHOS U/L 97 93 96   ALT (SGPT) U/L 222* 206* 198*   AST (SGOT) U/L 323* 320* 283*   GLUCOSE mg/dL 94 90 87  87     I have reviewed the patient current medications.     Assessment/Plan     Active Hospital Problems    Diagnosis   • **Pneumonia due to infectious organism   • Elevated transaminase level   • Elevated troponin   • Current use of long term anticoagulation   • Leukocytosis   • Generalized weakness     Plan:  1.  Continue to encourage incentive spirometer use.  Duo nebs as needed for shortness of breath.  Feel that the patient would benefit from steroid therapy, will start prednisone at 20 mg twice daily as she does not have IV access.  2.  Today is day 4 of cephalosporin therapy and she did complete 3 days of azithromycin.  She still complains of coughing with any liquid intake.  Speech therapy is noted that they do not notice any overt signs or symptoms of aspiration.  We cannot rule out silent aspiration at this point, so would like to broaden her antibiotic therapy to include anaerobic coverage.  Will change to Augmentin.  Add probiotic.  3.  Continue physical and occupational  therapy  4.  Transaminases are still elevated but stable.  Continue to hold atorvastatin.  She does have a known hepatic cyst.  5.  Noted continued elevation of white blood cell count.  Patient has not been on any outpatient steroid therapy that she is aware of.  I attempted to contact her son about this as well, with no answer.  Her urinalysis on admission was not indicative of infection.  She does not report any diarrhea.  Blood cultures have shown no growth to date.  We will continue to monitor closely.  6.  Labs in a.m.  7.  Bilateral shoulder x-rays    Discharge Planning: I expect the patient to be discharged to home with home health in ? days.    BJORN Chong   06/05/19   12:58 PM     Chart reviewed.  Patient examined.  Agree with assessment and plan.  Discussed with patient and with BENJY MILAN.    Torrie Weber DO  06/05/19  4:17 PM

## 2019-06-05 NOTE — PLAN OF CARE
Problem: Fall Risk (Adult)  Goal: Absence of Fall  Outcome: Ongoing (interventions implemented as appropriate)   06/05/19 0311   Fall Risk (Adult)   Absence of Fall making progress toward outcome       Problem: Patient Care Overview  Goal: Plan of Care Review   06/05/19 0311   Coping/Psychosocial   Plan of Care Reviewed With patient   Plan of Care Review   Progress no change   OTHER   Outcome Summary Pt alert and oriented X 4. No c/o pain. +3 to +4 edema noted in all extremities. BP Systolic continue to stay in the 90s. VSS. Safety maintained. Will continue to monitor.       Problem: Pneumonia (Adult)  Goal: Signs and Symptoms of Listed Potential Problems Will be Absent, Minimized or Managed (Pneumonia)  Outcome: Ongoing (interventions implemented as appropriate)   06/05/19 0311   Goal/Outcome Evaluation   Problems Assessed (Pneumonia) all   Problems Present (Pneumonia) respiratory compromise

## 2019-06-05 NOTE — NURSING NOTE
Patient had a 10 sec. run of SVT, . Patient was ambulating in the hallway with PT. No s/s of distress noted. Will continue to monitor.

## 2019-06-05 NOTE — PLAN OF CARE
Problem: Patient Care Overview  Goal: Plan of Care Review  Outcome: Ongoing (interventions implemented as appropriate)   06/05/19 1041   Coping/Psychosocial   Plan of Care Reviewed With patient   Plan of Care Review   Progress improving   OTHER   Outcome Summary Pt. doing well with therapy. Pt. was CGA for transfers and gait-she was slow, but able. She was able to walk 50 x 2 with one standing rest. She was quite fatigued following activity. Pt. would benefit from therapy following discharge.

## 2019-06-05 NOTE — THERAPY TREATMENT NOTE
Acute Care - Physical Therapy Treatment Note  [facility]     Patient Name: Karen Snell  : 1945  MRN: [id number]  Today's Date: 2019  Onset of Illness/Injury or Date of Surgery: 19  Date of Referral to PT: 19  Referring Physician: Dr. Gardner    Admit Date: 2019    Visit Dx:    ICD-10-CM ICD-9-CM   1. Inability to walk R26.2 719.7   2. Leukocytosis, unspecified type D72.829 288.60   3. Other dysphagia R13.19 787.29   4. Impaired functional mobility, balance, gait, and endurance Z74.09 V49.89   5. Decreased activities of daily living (ADL) R68.89 780.99     Patient Active Problem List   Diagnosis   • Colon polyps   • Epigastric pain   • History of DVT in adulthood   • Altered mental status   • Hernia, umbilical   • Abnormal finding on imaging   • Inability to walk   • Elevated troponin   • Current use of long term anticoagulation   • Leukocytosis   • Pneumonia due to infectious organism   • Generalized weakness   • Elevated transaminase level       Therapy Treatment    Rehabilitation Treatment Summary     Row Name 19 1442 19 1347 19 1128       Treatment Time/Intention    Discipline  physical therapy assistant  -MF  physical therapy assistant  -  occupational therapy assistant  -TS    Document Type  therapy note (daily note)  -  therapy note (daily note)  -[facility]  therapy note (daily note)  -TS    Subjective Information  complains of;fatigue  -  complains of;fatigue;pain  -[facility]  complains of;fatigue  -2    Mode of Treatment  physical therapy  -  physical therapy  -[facility]  --    Patient/Family Observations  --  --  daughter in law present  -TS2    Patient Effort  --  --  good  -TS2    Existing Precautions/Restrictions  fall  -  fall  -2  fall  -TS2    Treatment Considerations/Comments  --  --  pt had just finished shower task with assist of aid  -TS2    Patient Response to Treatment  Upon entering room-pt .was asleep in chair, but leaning forward unsafely. Woke pt.  up and assisted her back to bed. Pt. too fatigued to do any more.   -  --  --    Recorded by [MF] Molly Ybarra, PTA 06/05/19 1454 [MF] Molly Ybarra, PTA 06/05/19 1347  [MF2] Molly Ybarra, PTA 06/05/19 1357 [TS] Zenia Ibarra PINTO/L 06/05/19 1135  [TS2] Zenia Ibarra PINTO/L 06/05/19 1230    Row Name 06/05/19 1041 06/05/19 0816          Treatment Time/Intention    Discipline  physical therapy assistant  -  speech language pathologist  -RALEIGH GARRISON,MB2     Document Type  therapy note (daily note)  -[facility]  therapy note (daily note)  -RALEIGH GARRISON,MB2     Subjective Information  complains of;weakness  -[facility]  no complaints  -RALEIGH GARRISON,MB2     Mode of Treatment  physical therapy  -[facility]  speech-language pathology  -RALEIGH GARRISON,MB2     Patient/Family Observations  --  no family present  -RALEIGH GARRISON,MB2     Patient Effort  --  good  -RALEIGH GARRISON,MB2     Existing Precautions/Restrictions  fall  -[facility]  --     Recorded by [MF] Molly Ybarra, PTA 06/05/19 1041  [MF2] Molly Ybarra, PTA 06/05/19 1101 [MB,KK,MB2] Aroldo Patel CCC-SLP (r) Magy Pavon, Speech Therapy Student (t) Aroldo Patel CCC-SLP (c) 06/05/19 0939     Row Name 06/05/19 1128             Cognitive Assessment/Intervention- PT/OT    Personal Safety Interventions  fall prevention program maintained;gait belt;nonskid shoes/slippers when out of bed  -TS      Recorded by [TS] Zenia Ibarra COTA/L 06/05/19 1230      Row Name 06/05/19 1442 06/05/19 1347 06/05/19 1041       Bed Mobility Assessment/Treatment    Supine-Sit [place] (Bed Mobility)  -- up in chair  -  --  --    Sit-Supine [place] (Bed Mobility)  verbal cues;minimum assist (75% patient effort);moderate assist (50% patient effort)  -MF  --  --    Bed Mobility, Safety Issues  decreased use of legs for bridging/pushing  -MF  --  --    Comment (Bed Mobility)  assisted with LEs  -MF  up in chair  -MF  up in chair  -MF    Recorded by [MF] Molly Ybarra, PTA  06/05/19 1454 [MF] Molly Ybarra, PTA 06/05/19 1357 [MF] Molly Ybarra, PTA 06/05/19 1101    Row Name 06/05/19 1128             Functional Mobility    Functional Mobility- Ind. Level  contact guard assist  -TS      Functional Mobility- Device  rolling walker  -TS      Functional Mobility- Comment  in room, in BR. Pt daughter in law states they have RW at home but are looking into rollator for pt  -TS      Recorded by [TS] Zenia Ibarra COTA/L 06/05/19 1230      Row Name 06/05/19 1128 06/05/19 1041          Transfer Assessment/Treatment    Transfer Assessment/Treatment  sit-stand transfer;stand-sit transfer  -TS  --     Comment (Transfers)  increased assist required from lower chair height. Pt and daughter in law educated on ways to increase height of seat of increased independence and greater safety during transfers  -TS  after ambulation, assisted pt. with walking to shower and stepping into shower for nursing to bathe her.   -MF     Recorded by [TS] Zenia Ibarra COTA/L 06/05/19 1230 [MF] Molly Ybarra, PTA 06/05/19 1120     Row Name 06/05/19 1442             Chair-Bed Transfer    Chair-Bed [place] (Transfers)  verbal cues;contact guard  -      Assistive Device (Chair-Bed Transfers)  walker, front-wheeled  -MF      Recorded by [MF] Molly Ybarra, PTA 06/05/19 1454      Row Name 06/05/19 1442 06/05/19 1347 06/05/19 1128       Sit-Stand Transfer    Sit-Stand [place] (Transfers)  contact guard;minimum assist (75% patient effort)  -  contact guard  -MF  contact guard;minimum assist (75% patient effort)  -TS    Assistive Device (Sit-Stand Transfers)  --  --  walker, front-wheeled  -TS    Recorded by [MF] Molly Ybarra, PTA 06/05/19 1454 [MF] Molly Ybarra, PTA 06/05/19 1357 [TS] Zenia Ibarra COTA/L 06/05/19 1230    Row Name 06/05/19 1041             Sit-Stand Transfer    Sit-Stand [place] (Transfers)  verbal cues;contact guard;minimum  assist (75% patient effort)  -MF      Recorded by [MF] Molly Ybarra, PTA 06/05/19 1101      Row Name 06/05/19 1442 06/05/19 1347 06/05/19 1128       Stand-Sit Transfer    Stand-Sit [place] (Transfers)  contact guard  -MF  contact guard  -MF  contact guard  -TS    Assistive Device (Stand-Sit Transfers)  --  --  walker, front-wheeled  -TS    Recorded by [MF] Molly Ybarra, PTA 06/05/19 1454 [MF] Molly Ybarra, PTA 06/05/19 1357 [TS] Zenia Ibarra COTA/L 06/05/19 1230    Row Name 06/05/19 1041             Stand-Sit Transfer    Stand-Sit [place] (Transfers)  verbal cues;contact guard  -MF      Recorded by [MF] Molly Ybarra, PTA 06/05/19 1101      Row Name 06/05/19 1041             Toilet Transfer    Type (Toilet Transfer)  stand-sit  -MF      [place] Level (Toilet Transfer)  contact guard;verbal cues  -MF      Assistive Device (Toilet Transfer)  grab bars/safety frame shower seat  -MF      Recorded by [MF] Molly Ybarra, PTA 06/05/19 1120      Row Name 06/05/19 1347 06/05/19 1041          Gait/Stairs Assessment/Training    [place] Level (Gait)  contact guard  -MF  verbal cues;contact guard  -MF     Assistive Device (Gait)  walker, front-wheeled  -MF  walker, front-wheeled  -MF     Distance in Feet (Gait)  10  -MF  50' x 2 with one standing rest  -MF     Deviations/Abnormal Patterns (Gait)  stride length decreased;corinne decreased  -MF  stride length decreased;corinne decreased  -MF     Bilateral Gait Deviations  heel strike decreased  -MF  heel strike decreased  -MF     Comment (Gait/Stairs)  Transporter came to take pt. for xx-ray while doing ex's. Pt. walked to transport chair for x-ray  -MF  --     Recorded by [MF] Molly Ybarra, PTA 06/05/19 1357 [MF] Molly Ybarra, PTA 06/05/19 1101     Row Name 06/05/19 1128             ADL Assessment/Intervention    BADL Assessment/Intervention  lower body dressing;grooming  -TS      Recorded by [TS]  Zenia Ibarra COTA/L 06/05/19 1230      Row Name 06/05/19 1128             Lower Body Dressing Assessment/Training    Lower Body Dressing [place] Level  don;undergarment;minimum assist (75% patient effort);verbal cues  -TS      Assistive Devices (Lower Body Dressing)  reacher  -TS      Lower Body Dressing Position  supported sitting;supported standing  -TS      Recorded by [TS] Zenia Ibarra COTA/L 06/05/19 1230      Row Name 06/05/19 1128             Grooming Assessment/Training    [place] Level (Grooming)  oral care regimen;set up SBA  -TS      Grooming Position  supported sitting;supported standing;sink side  -TS      Recorded by [TS] Zenia Ibarra COTA/L 06/05/19 1230      Row Name 06/05/19 1347 06/05/19 1041          Therapeutic Exercise    Lower Extremity Range of Motion (Therapeutic Exercise)  ankle dorsiflexion/plantar flexion, bilateral  -MF  ankle dorsiflexion/plantar flexion, bilateral;hip flexion/extension, bilateral LAQs, assisted with hip flexion  -MF     Exercise Type (Therapeutic Exercise)  AROM (active range of motion)  -MF  AROM (active range of motion);AAROM (active assistive range of motion)  -MF     Position (Therapeutic Exercise)  seated  -  seated  -MF     Sets/Reps (Therapeutic Exercise)  10  -MF  20  -MF     Recorded by [MF] Molly Ybarra, PTA 06/05/19 1357 [MF] Molly Ybarra, PTA 06/05/19 1101     Row Name 06/05/19 1442 06/05/19 1347 06/05/19 1128       Positioning and Restraints    Pre-Treatment Position  sitting in chair/recliner  -  sitting in chair/recliner  -  sitting in chair/recliner  -TS    Post Treatment Position  bed  -  wheelchair  -  chair  -TS    In Bed  fowlers;call light within reach;encouraged to call for assist;side rails up x2;legs elevated  -  --  --    In Chair  --  --  sitting;call light within reach;encouraged to call for assist  -TS    In Wheelchair  --  sitting;with other staff  -  --    Recorded by [MF]  Molly Ybarra, PTA 06/05/19 1454 [MF] Molly Ybarra, \[facility] [place] [facility]\"" 06/05/19 1357 [TS] Zenia Ibarra COTA/L 06/05/19 1230    Row Name 06/05/19 1041             Positioning and Restraints    Pre-Treatment Position  sitting in chair/recliner  -      Post Treatment Position  bathroom  -MF2      Bathroom  sitting;call light within reach;with nsg  -MF2      Recorded by [MF] Molly Ybarra, PTA 06/05/19 1101  [MF2] Molly Ybarra, \[facility] [place] [facility]\"" 06/05/19 1120      Row Name 06/05/19 0816             Pain Assessment    Additional Documentation  Pain Scale: FACES Pre/Post-Treatment (Group)  -MB,KK,MB2      Recorded by [MB,KK,MB2] Aroldo Patel CCC-SLP (r) Magy Pavon, Speech Therapy Student (t) Aroldo Patel CCC-SLP (c) 06/05/19 0939      Row Name 06/05/19 1442 06/05/19 1128 06/05/19 1041       Pain Scale: Numbers Pre/Post-Treatment    Pain Scale: Numbers, Pretreatment  0/10 - no pain  -  0/10 - no pain  -TS  0/10 - no pain  -    Pain Scale: Numbers, Post-Treatment  0/10 - no pain  -MF  0/10 - no pain  -TS  --    Recorded by [MF] Molly Ybarra, PTA 06/05/19 1454 [TS] Zenia Ibarra COTA/L 06/05/19 1230 [MF] Molly Ybarra, PTA 06/05/19 1101    Row Name 06/05/19 1347 06/05/19 0816          Pain Scale: FACES Pre/Post-Treatment    Pain: FACES Scale, Pretreatment  4-->hurts little more  -  0-->no hurt  -MB,KK,MB2     Pain: FACES Scale, Post-Treatment  --  0-->no hurt  -MB,KK,MB2     Pre/Post Treatment Pain Comment  L flank  -  --     Recorded by [MF] Molly Ybarra, PTA 06/05/19 1357 [MB,KK,MB2] Aroldo Patel, CCC-SLP (r) Magy Pavon, Speech Therapy Student (t) Aroldo Patel, CCC-SLP (c) 06/05/19 0939     Row Name 06/05/19 1128             Outcome Summary/Treatment Plan (OT)    Daily Summary of Progress (OT)  progress towards functional goals is fair  -TS      Recorded by [TS] Zenia Ibarra COTA/L 06/05/19 1230      Row Name 06/05/19 08              Outcome Summary/Treatment Plan (SLP)    Daily Summary of Progress (SLP)  progress toward functional goals is good  -RALEIGH GARRISON MB2      Barriers to Overall Progress (SLP)  none  -RALEIGH GARRISON MB2      Plan for Continued Treatment (SLP)  continue to target goals for least restrictive diet  -RALEIGH GARRISON MB2      Anticipated Dischage Disposition  home with assist  -RALEIGH GARRISON MB2      Patient/Family Concerns, Anticipated Discharge Disposition (SLP)  none stated  -RALEIGH GARRISON MB2      Recorded by [RALEIGH GARRISON MB2] Aroldo Patel CCC-SLP (r) Magy Pavon, Speech Therapy Student (t) Aroldo Patel CCC-SLP (c) 06/05/19 0939        User Key  (r) = Recorded By, (t) = Taken By, (c) = Cosigned By    Initials Name Effective Dates Discipline    Aroldo Cloud CCC-SLP 08/02/16 -  SLP    Zenia Pickett PINTO/L 08/02/16 -  OT    Molly Knapp, PTA 08/02/16 -  PT    Magy Coronado, Speech Therapy Student 04/24/19 -  SLP               Rehab Goal Summary     Row Name 06/05/19 0816             Oral Nutrition/Hydration Goal 1 (SLP)    Oral Nutrition/Hydration Goal 1, SLP  LTG: Patient will tolerate LRD with no overt s/s of aspiration.  -MB (r) KK (t) MB (c)      Time Frame (Oral Nutrition/Hydration Goal 1, SLP)  short term goal (STG);by discharge  -MB (r) RALEIGH (t) MB (c)      Barriers (Oral Nutrition/Hydration Goal 1, SLP)  poor appetite  -MB (r) KK (t) MB (c)      Progress/Outcomes (Oral Nutrition/Hydration Goal 1, SLP)  continuing progress toward goal  -MB (r) RALEIGH (t) MB (c)        User Key  (r) = Recorded By, (t) = Taken By, (c) = Cosigned By    Initials Name Provider Type Discipline    Aroldo Cloud CCC-SLP Speech and Language Pathologist SLP    Magy Coronado, Speech Therapy Student Speech Therapy Student SLP          Physical Therapy Education     Title: PT OT SLP Therapies (Done)     Topic: Physical Therapy (Done)     Point: Mobility training (Done)     Learning Progress Summary           Patient  Acceptance, E,TB,D, VU,DU,NR by SAILAJA at 6/3/2019 10:47 AM    Comment:  Education re: purpose of PT and importance of activity; education for improved technique w/ bed mobility, tfers and gait w/ use of rwx.  Education to pace activities due to shortness of breath and fatigue w/ minimal effort.   Family Acceptance, E,TB,D, VU,DU,NR by SAILAJA at 6/3/2019 10:47 AM    Comment:  Education re: purpose of PT and importance of activity; education for improved technique w/ bed mobility, tfers and gait w/ use of rwx.  Education to pace activities due to shortness of breath and fatigue w/ minimal effort.                   Point: Precautions (Done)     Learning Progress Summary           Patient Acceptance, E,TB,D, VU,DU,NR by SAILAJA at 6/3/2019 10:47 AM    Comment:  Education re: purpose of PT and importance of activity; education for improved technique w/ bed mobility, tfers and gait w/ use of rwx.  Education to pace activities due to shortness of breath and fatigue w/ minimal effort.   Family Acceptance, E,TB,D, VU,DU,NR by SAILAJA at 6/3/2019 10:47 AM    Comment:  Education re: purpose of PT and importance of activity; education for improved technique w/ bed mobility, tfers and gait w/ use of rwx.  Education to pace activities due to shortness of breath and fatigue w/ minimal effort.                               User Key     Initials Effective Dates Name Provider Type Discipline     08/02/18 -  Racheal Dorantes, PT Physical Therapist PT                PT Recommendation and Plan     Plan of Care Reviewed With: patient  Progress: improving  Outcome Summary: Pt. doing well with therapy. Pt. was CGA for transfers and gait-she was slow, but able. She was able to walk 50 x 2 with one standing rest. She was quite fatigued following activity. Pt. would benefit from therapy following discharge.   Outcome Measures     Row Name 06/05/19 1200 06/03/19 1000          How much help from another is currently needed...    Putting on and taking off  regular lower body clothing?  3  -TS  2  -CH     Bathing (including washing, rinsing, and drying)  3  -TS  2  -CH     Toileting (which includes using toilet bed pan or urinal)  3  -TS  2  -CH     Putting on and taking off regular upper body clothing  4  -TS  3  -CH     Taking care of personal grooming (such as brushing teeth)  4  -TS  4  -CH     Eating meals  4  -TS  4  -CH     Score  21  -TS  17  -CH        Functional Assessment    Outcome Measure Options  AM-PAC 6 Clicks Daily Activity (OT)  -TS  AM-PAC 6 Clicks Daily Activity (OT)  -CH       User Key  (r) = Recorded By, (t) = Taken By, (c) = Cosigned By    Initials Name Provider Type     Gaviota Beck, OTR/L Occupational Therapist    TS Zenia Ibarra, MILLIE/L Occupational Therapy Assistant         Time Calculation:   PT Charges     Row Name 06/05/19 1454 06/05/19 1357 06/05/19 1122       Time Calculation    Start Time  1442  -MF  1347  -MF  1041  -MF    Stop Time  1451  -MF  1355  -MF  1120  -    Time Calculation (min)  9 min  -MF  8 min  -MF  39 min  -MF    PT Received On  06/05/19  -MF  06/05/19  -  06/05/19  -    PT Goal Re-Cert Due Date  06/13/19  -  06/13/19  -  06/13/19  -       Time Calculation- PT    Total Timed Code Minutes- PT  9 minute(s)  -MF  8 minute(s)  -MF  39 minute(s)  -MF       Timed Charges    54398 - PT Therapeutic Exercise Minutes  --  --  12  -MF    10975 - Gait Training Minutes   --  8  -MF  27  -MF    05345 - PT Therapeutic Activity Minutes  9  -MF  --  --      User Key  (r) = Recorded By, (t) = Taken By, (c) = Cosigned By    Initials Name Provider Type    Molly Knapp PTA Physical Therapy Assistant        Therapy Charges for Today     Code Description Service Date Service Provider Modifiers Qty    54427466311 HC PT THER PROC EA 15 MIN 6/5/2019 Molly Ybarra PTA GP 1    71540185394 HC GAIT TRAINING EA 15 MIN 6/5/2019 Molly Ybarra PTA GP 2    54385210311 HC GAIT TRAINING EA 15 MIN 6/5/2019  Molly Ybarra, PTA GP 1    23682468047 HC PT THERAPEUTIC ACT EA 15 MIN 6/5/2019 Molly Ybarra, JOSEFINA GP 1          PT G-Codes  Outcome Measure Options: AM-PAC 6 Clicks Daily Activity (OT)  Score: 21    Molly Ybarra PTA  6/5/2019

## 2019-06-05 NOTE — PLAN OF CARE
Problem: Patient Care Overview  Goal: Plan of Care Review  Outcome: Ongoing (interventions implemented as appropriate)   06/05/19 0822   Coping/Psychosocial   Plan of Care Reviewed With patient   Plan of Care Review   Progress improving   OTHER   Outcome Summary Skilled swallow tx completed on 6/5/19. Upon entering, pt was sitting on edge of bed. Pt was pleasant and cooperative throughout duration of session. Yesterday (6/4/19), SLP recommended drinking via cup instead of straw to reduce overt s/s of aspiration. SSLP observed no straws in room and pt reports not using straws and only drinking from cup. Pt also stated that they were not having any difficulty drinking from cup and it was easier for them. Trials of thin liquid via cup and regular solids were completed. Pt showed no overt s/s of aspiration with thin liquids. During trials of regular solid with efraín cracker, pt showed no overt s/s of aspiration. Mild residue was present on tongue but was cleared after small sip of thin liquid. Pt was educated on s/s of aspiration. SLP recommends continuing to target swallowing goals for LRD.

## 2019-06-05 NOTE — PLAN OF CARE
Problem: Patient Care Overview  Goal: Plan of Care Review  Outcome: Ongoing (interventions implemented as appropriate)   06/05/19 1231   Coping/Psychosocial   Plan of Care Reviewed With patient   Plan of Care Review   Progress improving   OTHER   Outcome Summary Pt and family educated on DME for home safety and ADL simplification. Pt demonstrated increased fatigue after TB shower task. Pt Jairo/CGA for transfers but requires increased assist when transfering from lower surface. Pt sat at side sink to complete grooming task. Pt plans to discharge home with family assist but would benefit from HH therapy for strengthening and endurance. Continue OT POC

## 2019-06-05 NOTE — THERAPY TREATMENT NOTE
Acute Care - Physical Therapy Treatment Note  [facility]     Patient Name: Karen Snell  : 1945  MRN: [id number]  Today's Date: 2019  Onset of Illness/Injury or Date of Surgery: 19  Date of Referral to PT: 19  Referring Physician: Dr. Gardner    Admit Date: 2019    Visit Dx:    ICD-10-CM ICD-9-CM   1. Inability to walk R26.2 719.7   2. Leukocytosis, unspecified type D72.829 288.60   3. Other dysphagia R13.19 787.29   4. Impaired functional mobility, balance, gait, and endurance Z74.09 V49.89   5. Decreased activities of daily living (ADL) R68.89 780.99     Patient Active Problem List   Diagnosis   • Colon polyps   • Epigastric pain   • History of DVT in adulthood   • Altered mental status   • Hernia, umbilical   • Abnormal finding on imaging   • Inability to walk   • Elevated troponin   • Current use of long term anticoagulation   • Leukocytosis   • Pneumonia due to infectious organism   • Generalized weakness   • Elevated transaminase level       Therapy Treatment    Rehabilitation Treatment Summary     Row Name 19 1347 19 1128 19 1041       Treatment Time/Intention    Discipline  physical therapy assistant  -MF  occupational therapy assistant  -TS  physical therapy assistant  -MF    Document Type  therapy note (daily note)  -2  therapy note (daily note)  -TS  therapy note (daily note)  -2    Subjective Information  complains of;fatigue;pain  -2  complains of;fatigue  -TS2  complains of;weakness  -2    Mode of Treatment  physical therapy  -2  --  physical therapy  -[facility]    Patient/Family Observations  --  daughter in law present  -2  --    Patient Effort  --  good  -TS2  --    Existing Precautions/Restrictions  fall  -2  fall  -2  fall  -2    Treatment Considerations/Comments  --  pt had just finished shower task with assist of aid  -2  --    Recorded by [MF] Molly Ybarra, PTA 19 1347  [MF2] Molly Ybarra, PTA 19 1357 [TS]  Zenia Ibarra COTA/L 06/05/19 1135  [TS2] Zenia Ibarra, PINTO/L 06/05/19 1230 [MF] Molly Ybarra, PTA 06/05/19 1041  [MF2] Molly Ybarra, PTA 06/05/19 1101    Row Name 06/05/19 0816             Treatment Time/Intention    Discipline  speech language pathologist  -RALEIGH GARRISON,MB2      Document Type  therapy note (daily note)  -RALEIGH GARRISONMB2      Subjective Information  no complaints  -RALEIGH GARRISON,MB2      Mode of Treatment  speech-language pathology  -RALEIGH GARRISON,MB2      Patient/Family Observations  no family present  -RALEIGH GARRISON,MB2      Patient Effort  good  -RALEIGH GARRISON,MB2      Recorded by [RALEIGH GARRISON,MB2] Aroldo Patel CCC-SLP (r) Magy Pavon, Speech Therapy Student (t) Aroldo Patel CCC-SLP (c) 06/05/19 0939      Row Name 06/05/19 1128             Cognitive Assessment/Intervention- PT/OT    Personal Safety Interventions  fall prevention program maintained;gait belt;nonskid shoes/slippers when out of bed  -TS      Recorded by [TS] Zenia Ibarra COTA/L 06/05/19 1230      Row Name 06/05/19 1347 06/05/19 1041          Bed Mobility Assessment/Treatment    Comment (Bed Mobility)  up in chair  -MF  up in chair  -MF     Recorded by [MF] Molly Ybarra, PTA 06/05/19 1357 [MF] Molly Ybarra, PTA 06/05/19 1101     Row Name 06/05/19 1128             Functional Mobility    Functional Mobility- Ind. Level  contact guard assist  -TS      Functional Mobility- Device  rolling walker  -TS      Functional Mobility- Comment  in room, in BR. Pt daughter in law states they have RW at home but are looking into rollator for pt  -TS      Recorded by [TS] Zenia Ibarra PINTO/L 06/05/19 1230      Row Name 06/05/19 1128 06/05/19 1041          Transfer Assessment/Treatment    Transfer Assessment/Treatment  sit-stand transfer;stand-sit transfer  -TS  --     Comment (Transfers)  increased assist required from lower chair height. Pt and daughter in law educated on ways to increase height of seat of increased  independence and greater safety during transfers  -TS  after ambulation, assisted pt. with walking to shower and stepping into shower for nursing to bathe her.   -MF     Recorded by [TS] Zenia Ibarra COTA/L 06/05/19 1230 [MF] Molly Ybarra, PTA 06/05/19 1120     Row Name 06/05/19 1347 06/05/19 1128 06/05/19 1041       Sit-Stand Transfer    Sit-Stand [place] (Transfers)  contact guard  -  contact guard;minimum assist (75% patient effort)  -TS  verbal cues;contact guard;minimum assist (75% patient effort)  -    Assistive Device (Sit-Stand Transfers)  --  walker, front-wheeled  -TS  --    Recorded by [MF] Molly Ybarra, PTA 06/05/19 1357 [TS] Zenia Ibarra PINTO/L 06/05/19 1230 [MF] Molly Ybarra, PTA 06/05/19 1101    Row Name 06/05/19 1347 06/05/19 1128 06/05/19 1041       Stand-Sit Transfer    Stand-Sit [place] (Transfers)  contact guard  -  contact guard  -TS  verbal cues;contact guard  -    Assistive Device (Stand-Sit Transfers)  --  walker, front-wheeled  -TS  --    Recorded by [MF] Molly Ybarra, PTA 06/05/19 1357 [TS] Zenia Ibarra, PINTO/L 06/05/19 1230 [MF] Molly Ybarra, PTA 06/05/19 1101    Row Name 06/05/19 1041             Toilet Transfer    Type (Toilet Transfer)  stand-sit  -      [place] Level (Toilet Transfer)  contact guard;verbal cues  -      Assistive Device (Toilet Transfer)  grab bars/safety frame shower seat  -      Recorded by [MF] Molly Ybarra, PTA 06/05/19 1120      Row Name 06/05/19 1347 06/05/19 1041          Gait/Stairs Assessment/Training    [place] Level (Gait)  contact guard  -  verbal cues;contact guard  -     Assistive Device (Gait)  walker, front-wheeled  -MF  walker, front-wheeled  -MF     Distance in Feet (Gait)  10  -MF  50' x 2 with one standing rest  -     Deviations/Abnormal Patterns (Gait)  stride length decreased;corinne decreased  -  stride length decreased;corinne decreased   -MF     Bilateral Gait Deviations  heel strike decreased  -MF  heel strike decreased  -MF     Comment (Gait/Stairs)  Transporter came to take pt. for xx-ray while doing ex's. Pt. walked to transport chair for x-ray  -MF  --     Recorded by [MF] Molly Ybarra, PTA 06/05/19 1357 [MF] Molly Ybarra, PTA 06/05/19 1101     Row Name 06/05/19 1128             ADL Assessment/Intervention    BADL Assessment/Intervention  lower body dressing;grooming  -TS      Recorded by [TS] Zenia Ibarra COTA/L 06/05/19 1230      Row Name 06/05/19 1128             Lower Body Dressing Assessment/Training    Lower Body Dressing [place] Level  don;undergarment;minimum assist (75% patient effort);verbal cues  -TS      Assistive Devices (Lower Body Dressing)  reacher  -TS      Lower Body Dressing Position  supported sitting;supported standing  -TS      Recorded by [TS] Zenia Ibarra COTA/L 06/05/19 1230      Row Name 06/05/19 1128             Grooming Assessment/Training    [place] Level (Grooming)  oral care regimen;set up SBA  -TS      Grooming Position  supported sitting;supported standing;sink side  -TS      Recorded by [TS] Zenia Ibarra COTA/L 06/05/19 1230      Row Name 06/05/19 1347 06/05/19 1041          Therapeutic Exercise    Lower Extremity Range of Motion (Therapeutic Exercise)  ankle dorsiflexion/plantar flexion, bilateral  -MF  ankle dorsiflexion/plantar flexion, bilateral;hip flexion/extension, bilateral LAQs, assisted with hip flexion  -MF     Exercise Type (Therapeutic Exercise)  AROM (active range of motion)  -MF  AROM (active range of motion);AAROM (active assistive range of motion)  -MF     Position (Therapeutic Exercise)  seated  -MF  seated  -MF     Sets/Reps (Therapeutic Exercise)  10  -MF  20  -MF     Recorded by [MF] Molly Ybarra, PTA 06/05/19 1357 [MF] Molly Ybarra, PTA 06/05/19 1101     Row Name 06/05/19 1347 06/05/19 1128 06/05/19 1041       Positioning and  Restraints    Pre-Treatment Position  sitting in chair/recliner  -MF  sitting in chair/recliner  -TS  sitting in chair/recliner  -    Post Treatment Position  wheelchair  -MF  chair  -TS  bathroom  -MF2    In Chair  --  sitting;call light within reach;encouraged to call for assist  -TS  --    In Wheelchair  sitting;with other staff  -  --  --    Bathroom  --  --  sitting;call light within reach;with nsg  -2    Recorded by [MF] Molly Ybarra, PTA 06/05/19 1357 [TS] Zenia Ibarra PINTO/L 06/05/19 1230 [MF] Molly Ybarra, PTA 06/05/19 1101  [MF2] Molly Ybarra, PTA 06/05/19 1120    Row Name 06/05/19 0816             Pain Assessment    Additional Documentation  Pain Scale: FACES Pre/Post-Treatment (Group)  -RALEIGH GARRISON,MB2      Recorded by [MB,KK,MB2] Aroldo Patel CCC-SLP (r) Magy Pavon, Speech Therapy Student (t) Aroldo Patel CCC-SLP (c) 06/05/19 0939      Row Name 06/05/19 1128 06/05/19 1041          Pain Scale: Numbers Pre/Post-Treatment    Pain Scale: Numbers, Pretreatment  0/10 - no pain  -TS  0/10 - no pain  -     Pain Scale: Numbers, Post-Treatment  0/10 - no pain  -TS  --     Recorded by [TS] Zenia Ibarra COTA/L 06/05/19 1230 [MF] Molly Ybarra, PTA 06/05/19 1101     Row Name 06/05/19 1347 06/05/19 0816          Pain Scale: FACES Pre/Post-Treatment    Pain: FACES Scale, Pretreatment  4-->hurts little more  -  0-->no hurt  -MB,KK,MB2     Pain: FACES Scale, Post-Treatment  --  0-->no hurt  -MB,KK,MB2     Pre/Post Treatment Pain Comment  L flank  -  --     Recorded by [MF] Molly Ybarra, PTA 06/05/19 1357 [MB,KK,MB2] Aroldo Patel, CCC-SLP (r) Magy Pavon, Speech Therapy Student (t) Aroldo Patel, CCC-SLP (c) 06/05/19 0939     Row Name 06/05/19 1128             Outcome Summary/Treatment Plan (OT)    Daily Summary of Progress (OT)  progress towards functional goals is fair  -TS      Recorded by [TS] Zenia Ibarra COTA/TAYLOR  06/05/19 1230      Row Name 06/05/19 0816             Outcome Summary/Treatment Plan (SLP)    Daily Summary of Progress (SLP)  progress toward functional goals is good  -RALEIGH GARRISON MB2      Barriers to Overall Progress (SLP)  none  -RALEIGH GARRISON MB2      Plan for Continued Treatment (SLP)  continue to target goals for least restrictive diet  -RALEIGH GARRISON MB2      Anticipated Dischage Disposition  home with assist  -RALEIGH GARRISON MB2      Patient/Family Concerns, Anticipated Discharge Disposition (SLP)  none stated  -RALEIGH GARRISON MB2      Recorded by [RALEIGH GARRISON MB2] Aroldo Patel CCC-BENJAMIN (r) Magy Pavon, Speech Therapy Student (t) Aroldo Patel CCC-SLP (c) 06/05/19 0939        User Key  (r) = Recorded By, (t) = Taken By, (c) = Cosigned By    Initials Name Effective Dates Discipline    Aroldo Cloud CCC-SLP 08/02/16 -  SLP    Zenia Pickett PINTO/L 08/02/16 -  OT    Molly Knapp, PTA 08/02/16 -  PT    Magy Coronado, Speech Therapy Student 04/24/19 -  SLP               Rehab Goal Summary     Row Name 06/05/19 0816             Oral Nutrition/Hydration Goal 1 (SLP)    Oral Nutrition/Hydration Goal 1, SLP  LTG: Patient will tolerate LRD with no overt s/s of aspiration.  -MB (r) KK (t) MB (c)      Time Frame (Oral Nutrition/Hydration Goal 1, SLP)  short term goal (STG);by discharge  -MB (r) RALEIGH (t) MB (c)      Barriers (Oral Nutrition/Hydration Goal 1, SLP)  poor appetite  -MB (r) KK (t) MB (c)      Progress/Outcomes (Oral Nutrition/Hydration Goal 1, SLP)  continuing progress toward goal  -MB (r) KK (t) MB (c)        User Key  (r) = Recorded By, (t) = Taken By, (c) = Cosigned By    Initials Name Provider Type Discipline    Aroldo Cloud CCC-SLP Speech and Language Pathologist SLP    Magy Coronado, Speech Therapy Student Speech Therapy Student SLP          Physical Therapy Education     Title: PT OT SLP Therapies (Done)     Topic: Physical Therapy (Done)     Point: Mobility training (Done)      Learning Progress Summary           Patient Acceptance, E,TB,D, VU,DU,NR by SAILAJA at 6/3/2019 10:47 AM    Comment:  Education re: purpose of PT and importance of activity; education for improved technique w/ bed mobility, tfers and gait w/ use of rwx.  Education to pace activities due to shortness of breath and fatigue w/ minimal effort.   Family Acceptance, E,TB,D, VU,DU,NR by SAILAJA at 6/3/2019 10:47 AM    Comment:  Education re: purpose of PT and importance of activity; education for improved technique w/ bed mobility, tfers and gait w/ use of rwx.  Education to pace activities due to shortness of breath and fatigue w/ minimal effort.                   Point: Precautions (Done)     Learning Progress Summary           Patient Acceptance, E,TB,D, VU,DU,NR by  at 6/3/2019 10:47 AM    Comment:  Education re: purpose of PT and importance of activity; education for improved technique w/ bed mobility, tfers and gait w/ use of rwx.  Education to pace activities due to shortness of breath and fatigue w/ minimal effort.   Family Acceptance, E,TB,D, VU,DU,NR by  at 6/3/2019 10:47 AM    Comment:  Education re: purpose of PT and importance of activity; education for improved technique w/ bed mobility, tfers and gait w/ use of rwx.  Education to pace activities due to shortness of breath and fatigue w/ minimal effort.                               User Key     Initials Effective Dates Name Provider Type Discipline     08/02/18 -  Racheal Dorantes, PT Physical Therapist PT                PT Recommendation and Plan     Plan of Care Reviewed With: patient  Progress: improving  Outcome Summary: Pt. doing well with therapy. Pt. was CGA for transfers and gait-she was slow, but able. She was able to walk 50 x 2 with one standing rest. She was quite fatigued following activity. Pt. would benefit from therapy following discharge.   Outcome Measures     Row Name 06/05/19 1200 06/03/19 1000          How much help from another is currently  needed...    Putting on and taking off regular lower body clothing?  3  -TS  2  -CH     Bathing (including washing, rinsing, and drying)  3  -TS  2  -CH     Toileting (which includes using toilet bed pan or urinal)  3  -TS  2  -CH     Putting on and taking off regular upper body clothing  4  -TS  3  -CH     Taking care of personal grooming (such as brushing teeth)  4  -TS  4  -CH     Eating meals  4  -TS  4  -CH     Score  21  -TS  17  -CH        Functional Assessment    Outcome Measure Options  AM-PAC 6 Clicks Daily Activity (OT)  -TS  AM-PAC 6 Clicks Daily Activity (OT)  -CH       User Key  (r) = Recorded By, (t) = Taken By, (c) = Cosigned By    Initials Name Provider Type     Gaviota Beck, OTR/L Occupational Therapist    TS Zenia Ibarra, PINTO/L Occupational Therapy Assistant         Time Calculation:   PT Charges     Row Name 06/05/19 1357 06/05/19 1122          Time Calculation    Start Time  1347  -MF  1041  -MF     Stop Time  1355  -MF  1120  -MF     Time Calculation (min)  8 min  -MF  39 min  -MF     PT Received On  06/05/19  -MF  06/05/19  -MF     PT Goal Re-Cert Due Date  06/13/19  -MF  06/13/19  -MF        Time Calculation- PT    Total Timed Code Minutes- PT  8 minute(s)  -MF  39 minute(s)  -MF        Timed Charges    79754 - PT Therapeutic Exercise Minutes  --  12  -MF     84827 - Gait Training Minutes   8  -MF  27  -MF       User Key  (r) = Recorded By, (t) = Taken By, (c) = Cosigned By    Initials Name Provider Type     Molly Ybarra PTA Physical Therapy Assistant        Therapy Charges for Today     Code Description Service Date Service Provider Modifiers Qty    80363785828 HC PT THER PROC EA 15 MIN 6/5/2019 Molly Ybarra PTA GP 1    98519047277 HC GAIT TRAINING EA 15 MIN 6/5/2019 Molly Ybarra PTA GP 2    43588836811 HC GAIT TRAINING EA 15 MIN 6/5/2019 Molly Ybarra PTA GP 1          PT G-Codes  Outcome Measure Options: AM-PAC 6 Clicks Daily Activity  (OT)  Score: 21    Molly Ybarra, PTA  6/5/2019

## 2019-06-05 NOTE — PROGRESS NOTES
Continued Stay Note   [facility]     Patient Name: Karen Snell  MRN: [id number]  Today's Date: 6/5/2019    Admit Date: 6/2/2019    Discharge Plan     Row Name 06/05/19 0938       Plan    Plan Comments  Pt still plans on returning home at discharge with LIBRADO and EMILY. Pt is doing well with PT. SW will follow for HH orders to set up at discharge.         Discharge Codes    No documentation.             Natasha Riggs

## 2019-06-05 NOTE — PLAN OF CARE
Problem: Patient Care Overview  Goal: Plan of Care Review  Outcome: Outcome(s) achieved Date Met: 06/05/19

## 2019-06-05 NOTE — THERAPY TREATMENT NOTE
Acute Care - Occupational Therapy Treatment Note  [facility]     Patient Name: Karen Snell  : 1945  MRN: [id number]  Today's Date: 2019  Onset of Illness/Injury or Date of Surgery: 19  Date of Referral to OT: 19  Referring Physician: Dr. Gardner    Admit Date: 2019       ICD-10-CM ICD-9-CM   1. Inability to walk R26.2 719.7   2. Leukocytosis, unspecified type D72.829 288.60   3. Other dysphagia R13.19 787.29   4. Impaired functional mobility, balance, gait, and endurance Z74.09 V49.89   5. Decreased activities of daily living (ADL) R68.89 780.99     Patient Active Problem List   Diagnosis   • Colon polyps   • Epigastric pain   • History of DVT in adulthood   • Altered mental status   • Hernia, umbilical   • Abnormal finding on imaging   • Inability to walk   • Elevated troponin   • Current use of long term anticoagulation   • Leukocytosis   • Pneumonia due to infectious organism   • Generalized weakness   • Elevated transaminase level     Past Medical History:   Diagnosis Date   • Arthritis    • Colon polyp    • DVT (deep venous thrombosis) (CMS/HCC)    • GERD (gastroesophageal reflux disease)    • Hypertension    • PE (pulmonary thromboembolism) (CMS/HCC)    • Sleep apnea      Past Surgical History:   Procedure Laterality Date   • CHOLECYSTECTOMY     • COLONOSCOPY  2013   • COLONOSCOPY N/A 8/10/2017    Procedure: COLONOSCOPY WITH ANESTHESIA;  Surgeon: Jenna Page MD;  Location: [facility] ENDOSCOPY;  Service:    • ENDOSCOPY N/A 8/10/2017    Procedure: ESOPHAGOGASTRODUODENOSCOPY WITH ANESTHESIA;  Surgeon: Jenna Page MD;  Location: [facility] ENDOSCOPY;  Service:    • JOINT REPLACEMENT         Therapy Treatment    Rehabilitation Treatment Summary     Row Name 19 1128 19 1041 19 0816       Treatment Time/Intention    Discipline  occupational therapy assistant  -TS  physical therapy assistant  -MF  speech language pathologist  -MB,KK,MB2    Document Type  therapy note  (daily note)  -TS  therapy note (daily note)  -MF2  therapy note (daily note)  -RALEIGH GARRISON,MB2    Subjective Information  complains of;fatigue  -TS2  complains of;weakness  -MF2  no complaints  -RALEIGH GARRISON,MB2    Mode of Treatment  --  physical therapy  -MF2  speech-language pathology  -RALEIGH GARRISON,MB2    Patient/Family Observations  daughter in law present  -TS2  --  no family present  -RALEIGH GARRISON,MB2    Patient Effort  good  -TS2  --  good  -RALEIGH GARRISON,MB2    Existing Precautions/Restrictions  fall  -TS2  fall  -MF2  --    Treatment Considerations/Comments  pt had just finished shower task with assist of aid  -TS2  --  --    Recorded by [TS] Zenia Ibarra PINTO/L 06/05/19 1135  [TS2] Zenia Ibarra PINTO/L 06/05/19 1230 [MF] Molly Ybarra, PTA 06/05/19 1041  [MF2] Molly Ybarra, PTA 06/05/19 1101 [RALEIGH GARRISON,MB2] Aroldo Patel CCC-SLP (r) Magy Pavon, Speech Therapy Student (t) Aroldo Patel CCC-SLP (c) 06/05/19 0939    Row Name 06/05/19 1128             Cognitive Assessment/Intervention- PT/OT    Personal Safety Interventions  fall prevention program maintained;gait belt;nonskid shoes/slippers when out of bed  -TS      Recorded by [TS] Zenia Ibarra COTA/L 06/05/19 1230      Row Name 06/05/19 1041             Bed Mobility Assessment/Treatment    Comment (Bed Mobility)  up in chair  -      Recorded by [MF] Molly Ybarra, PTA 06/05/19 1101      Row Name 06/05/19 1128             Functional Mobility    Functional Mobility- Ind. Level  contact guard assist  -TS      Functional Mobility- Device  rolling walker  -TS      Functional Mobility- Comment  in room, in BR. Pt daughter in law states they have RW at home but are looking into rollator for pt  -TS      Recorded by [TS] Zenia Ibarra PINTO/L 06/05/19 1230      Row Name 06/05/19 1128 06/05/19 1041          Transfer Assessment/Treatment    Transfer Assessment/Treatment  sit-stand transfer;stand-sit transfer  -TS  --     Comment  (Transfers)  increased assist required from lower chair height. Pt and daughter in law educated on ways to increase height of seat of increased independence and greater safety during transfers  -TS  after ambulation, assisted pt. with walking to shower and stepping into shower for nursing to bathe her.   -MF     Recorded by [TS] Zenia Ibarra COTA/L 06/05/19 1230 [MF] Molly Ybarra, PTA 06/05/19 1120     Row Name 06/05/19 1128 06/05/19 1041          Sit-Stand Transfer    Sit-Stand [place] (Transfers)  contact guard;minimum assist (75% patient effort)  -TS  verbal cues;contact guard;minimum assist (75% patient effort)  -     Assistive Device (Sit-Stand Transfers)  walker, front-wheeled  -TS  --     Recorded by [TS] Zenia Ibarra COTA/L 06/05/19 1230 [MF] Molly Ybarra, PTA 06/05/19 1101     Row Name 06/05/19 1128 06/05/19 1041          Stand-Sit Transfer    Stand-Sit [place]stein (Transfers)  contact guard  -TS  verbal cues;contact guard  -     Assistive Device (Stand-Sit Transfers)  walker, front-wheeled  -TS  --     Recorded by [TS] Zenia Ibarra COTA/L 06/05/19 1230 [MF] Molly Ybarra, PTA 06/05/19 1101     Row Name 06/05/19 1041             Toilet Transfer    Type (Toilet Transfer)  stand-sit  -      Holstein Level (Toilet Transfer)  contact guard;verbal cues  -      Assistive Device (Toilet Transfer)  grab bars/safety frame shower seat  -MF      Recorded by [MF] Molly Ybarra, PTA 06/05/19 1120      Row Name 06/05/19 1041             Gait/Stairs Assessment/Training    Holstein Level (Gait)  verbal cues;contact guard  -      Assistive Device (Gait)  walker, front-wheeled  -MF      Distance in Feet (Gait)  50' x 2 with one standing rest  -MF      Deviations/Abnormal Patterns (Gait)  stride length decreased;corinne decreased  -MF      Bilateral Gait Deviations  heel strike decreased  -MF      Recorded by [MF] Molly Ybarra, PTA 06/05/19  1101      Row Name 06/05/19 1128             ADL Assessment/Intervention    BADL Assessment/Intervention  lower body dressing;grooming  -TS      Recorded by [TS] Zenia Ibarra COTA/L 06/05/19 1230      Row Name 06/05/19 1128             Lower Body Dressing Assessment/Training    Lower Body Dressing [place] Level  don;undergarment;minimum assist (75% patient effort);verbal cues  -TS      Assistive Devices (Lower Body Dressing)  reacher  -TS      Lower Body Dressing Position  supported sitting;supported standing  -TS      Recorded by [TS] Zenia Ibarra COTA/L 06/05/19 1230      Row Name 06/05/19 1128             Grooming Assessment/Training    [place] Level (Grooming)  oral care regimen;set up SBA  -TS      Grooming Position  supported sitting;supported standing;sink side  -TS      Recorded by [TS] Zenia Ibarra COTA/L 06/05/19 1230      Row Name 06/05/19 1041             Therapeutic Exercise    Lower Extremity Range of Motion (Therapeutic Exercise)  ankle dorsiflexion/plantar flexion, bilateral;hip flexion/extension, bilateral LAQs, assisted with hip flexion  -MF      Exercise Type (Therapeutic Exercise)  AROM (active range of motion);AAROM (active assistive range of motion)  -MF      Position (Therapeutic Exercise)  seated  -MF      Sets/Reps (Therapeutic Exercise)  20  -MF      Recorded by [MF] Molly Ybarra, PTA 06/05/19 1101      Row Name 06/05/19 1128 06/05/19 1041          Positioning and Restraints    Pre-Treatment Position  sitting in chair/recliner  -TS  sitting in chair/recliner  -MF     Post Treatment Position  chair  -TS  bathroom  -MF2     In Chair  sitting;call light within reach;encouraged to call for assist  -TS  --     Bathroom  --  sitting;call light within reach;with nsg  -MF2     Recorded by [TS] Zenia Ibarra COTA/L 06/05/19 1230 [MF] Molly Ybarra, PTA 06/05/19 1101  [MF2] Molly Ybarra, PTA 06/05/19 1120     Row Name 06/05/19 0816              Pain Assessment    Additional Documentation  Pain Scale: FACES Pre/Post-Treatment (Group)  -MB,KK,MB2      Recorded by [MB,KK,MB2] Aroldo Patel CCC-SLP (r) Magy Pavon, Speech Therapy Student (t) Aroldo Patel CCC-SLP (c) 06/05/19 0939      Row Name 06/05/19 1128 06/05/19 1041          Pain Scale: Numbers Pre/Post-Treatment    Pain Scale: Numbers, Pretreatment  0/10 - no pain  -TS  0/10 - no pain  -MF     Pain Scale: Numbers, Post-Treatment  0/10 - no pain  -TS  --     Recorded by [TS] Zenia Ibarra PINTO/L 06/05/19 1230 [MF] Molly Ybarra, [facility] 06/05/19 1101     Row Name 06/05/19 0816             Pain Scale: FACES Pre/Post-Treatment    Pain: FACES Scale, Pretreatment  0-->no hurt  -MB,KK,MB2      Pain: FACES Scale, Post-Treatment  0-->no hurt  -MB,KK,MB2      Recorded by [MB,KK,MB2] Aroldo Patel CCC-SLP (r) Magy Pavon, Speech Therapy Student (t) Aroldo Patel CCC-SLP (c) 06/05/19 0939      Row Name 06/05/19 1128             Outcome Summary/Treatment Plan (OT)    Daily Summary of Progress (OT)  progress towards functional goals is fair  -TS      Recorded by [TS] Zenia Ibarra PINTO/L 06/05/19 1230      Row Name 06/05/19 0816             Outcome Summary/Treatment Plan (SLP)    Daily Summary of Progress (SLP)  progress toward functional goals is good  -MB,KK,MB2      Barriers to Overall Progress (SLP)  none  -MB,KK,MB2      Plan for Continued Treatment (SLP)  continue to target goals for least restrictive diet  -MB,KK,MB2      Anticipated Dischage Disposition  home with assist  -MB,KK,MB2      Patient/Family Concerns, Anticipated Discharge Disposition (SLP)  none stated  -MB,KK,MB2      Recorded by [MB,KK,MB2] Aroldo Patel CCC-SLP (r) Magy Pavon, Speech Therapy Student (t) Aroldo Patel, CCC-SLP (c) 06/05/19 5448        User Key  (r) = Recorded By, (t) = Taken By, (c) = Cosigned By    Initials Name Effective Dates Discipline    MB  Aroldo Patel CCC-SLP 08/02/16 -  SLP    Zenia Pcikett, PINTO/L 08/02/16 -  OT    Molly Knapp, PTA 08/02/16 -  PT    Magy Coronado, Speech Therapy Student 04/24/19 -  SLP           Rehab Goal Summary     Row Name 06/05/19 0816             Oral Nutrition/Hydration Goal 1 (SLP)    Oral Nutrition/Hydration Goal 1, SLP  LTG: Patient will tolerate LRD with no overt s/s of aspiration.  -MB (r) KK (t) MB (c)      Time Frame (Oral Nutrition/Hydration Goal 1, SLP)  short term goal (STG);by discharge  -MB (r) KK (t) MB (c)      Barriers (Oral Nutrition/Hydration Goal 1, SLP)  poor appetite  -MB (r) KK (t) MB (c)      Progress/Outcomes (Oral Nutrition/Hydration Goal 1, SLP)  continuing progress toward goal  -MB (r) KK (t) MB (c)        User Key  (r) = Recorded By, (t) = Taken By, (c) = Cosigned By    Initials Name Provider Type Discipline    Aroldo Cloud, CCC-SLP Speech and Language Pathologist SLP    Magy Coronado, Speech Therapy Student Speech Therapy Student SLP            OT Recommendation and Plan  Outcome Summary/Treatment Plan (OT)  Daily Summary of Progress (OT): progress towards functional goals is fair  Daily Summary of Progress (OT): progress towards functional goals is fair  Plan of Care Review  Plan of Care Reviewed With: patient  Plan of Care Reviewed With: patient  Outcome Summary: Pt and family educated on DME for home safety and ADL simplification. Pt demonstrated increased fatigue after TB shower task. Pt Jairo/CGA for transfers but requires increased assist when transfering from lower surface. Pt sat at side sink to complete grooming task. Pt plans to discharge home with family assist but would benefit from HH therapy for strengthening and endurance. Continue OT POC   Outcome Measures     Row Name 06/05/19 1200 06/03/19 1000          How much help from another is currently needed...    Putting on and taking off regular lower body clothing?  3  -TS  2  -CH      Bathing (including washing, rinsing, and drying)  3  -TS  2  -CH     Toileting (which includes using toilet bed pan or urinal)  3  -TS  2  -CH     Putting on and taking off regular upper body clothing  4  -TS  3  -CH     Taking care of personal grooming (such as brushing teeth)  4  -TS  4  -CH     Eating meals  4  -TS  4  -CH     Score  21  -TS  17  -CH        Functional Assessment    Outcome Measure Options  AM-PAC 6 Clicks Daily Activity (OT)  -TS  AM-PAC 6 Clicks Daily Activity (OT)  -CH       User Key  (r) = Recorded By, (t) = Taken By, (c) = Cosigned By    Initials Name Provider Type     Gaviota Beck OTR/L Occupational Therapist    Zenia Pickett COTA/L Occupational Therapy Assistant           Time Calculation:   Time Calculation- OT     Row Name 06/05/19 1238 06/05/19 1122          Time Calculation- OT    OT Start Time  1128  -TS  --     OT Stop Time  1154  -TS  --     OT Time Calculation (min)  26 min  -TS  --     Total Timed Code Minutes- OT  26 minute(s)  -TS  --     OT Received On  06/05/19  -TS  --        Timed Charges    43025 - Gait Training Minutes   --  27  -     33665 - OT Self Care/Mgmt Minutes  26  -TS  --       User Key  (r) = Recorded By, (t) = Taken By, (c) = Cosigned By    Initials Name Provider Type     Zenia Ibarra COTA/L Occupational Therapy Assistant    Molly Knapp, PTA Physical Therapy Assistant        Therapy Charges for Today     Code Description Service Date Service Provider Modifiers Qty    [id number] HC OT SELF CARE/MGMT/TRAIN EA 15 MIN 6/5/2019 Zenia Ibarra COTA/L GO 2               Zenia ANDERSON. BETTY Ibarra  6/5/2019

## 2019-06-05 NOTE — THERAPY TREATMENT NOTE
Acute Care - Speech Language Pathology   Swallow Treatment Note [facility]     Patient Name: Karen Snell  : 1945  MRN: [id number]  Today's Date: 2019  Onset of Illness/Injury or Date of Surgery: 19     Referring Physician: Dr. Gardner      Admit Date: 2019  Skilled swallow tx completed on 19. Upon entering, pt was sitting on edge of bed. Pt was pleasant and cooperative throughout duration of session. Yesterday (19), SLP recommended drinking via cup instead of straw to reduce overt s/s of aspiration. SSLP observed no straws in room and pt reports not using straws and only drinking from cup. Pt also stated that they were not having any difficulty drinking from cup and it was easier for them. Trials of thin liquid via cup and regular solids were completed. Pt showed no overt s/s of aspiration with thin liquids. During trials of regular solid with efraín cracker, pt showed no overt s/s of aspiration. Mild residue was present on tongue but was cleared after small sip of thin liquid. Pt was educated on s/s of aspiration. SLP recommends continuing to target swallowing goals for LRD.   Magy Pavon, Speech Therapy Student    Visit Dx:      ICD-10-CM ICD-9-CM   1. Inability to walk R26.2 719.7   2. Leukocytosis, unspecified type D72.829 288.60   3. Other dysphagia R13.19 787.29   4. Impaired functional mobility, balance, gait, and endurance Z74.09 V49.89   5. Decreased activities of daily living (ADL) R68.89 780.99     Patient Active Problem List   Diagnosis   • Colon polyps   • Epigastric pain   • History of DVT in adulthood   • Altered mental status   • Hernia, umbilical   • Abnormal finding on imaging   • Inability to walk   • Elevated troponin   • Current use of long term anticoagulation   • Leukocytosis   • Pneumonia due to infectious organism   • Generalized weakness   • Elevated transaminase level       Therapy Treatment  Rehabilitation Treatment Summary     Row Name 19 0816              Treatment Time/Intention    Discipline  speech language pathologist  (Pended)   -KK      Document Type  therapy note (daily note)  (Pended)   -KK      Subjective Information  no complaints  (Pended)   -KK      Mode of Treatment  speech-language pathology  (Pended)   -KK      Patient/Family Observations  no family present  (Pended)   -KK      Patient Effort  good  (Pended)   -KK      Recorded by [KK] Magy Pavon Speech Therapy Student 06/05/19 0852      Row Name 06/05/19 0816             Pain Assessment    Additional Documentation  Pain Scale: FACES Pre/Post-Treatment (Group)  (Pended)   -KK      Recorded by [KK] Magy Pavon Speech Therapy Student 06/05/19 0852      Row Name 06/05/19 0816             Pain Scale: FACES Pre/Post-Treatment    Pain: FACES Scale, Pretreatment  0-->no hurt  (Pended)   -KK      Pain: FACES Scale, Post-Treatment  0-->no hurt  (Pended)   -KK      Recorded by [KK] Magy Pavon Speech Therapy Student 06/05/19 0852      Row Name 06/05/19 0816             Outcome Summary/Treatment Plan (SLP)    Daily Summary of Progress (SLP)  progress toward functional goals is good  (Pended)   -KK      Barriers to Overall Progress (SLP)  none  (Pended)   -KK      Plan for Continued Treatment (SLP)  continue to target goals for least restrictive diet  (Pended)   -KK      Anticipated Dischage Disposition  home with assist  (Pended)   -KK      Patient/Family Concerns, Anticipated Discharge Disposition (SLP)  none stated  (Pended)   -KK      Recorded by [KK] Magy Pavon Speech Therapy Student 06/05/19 0852        User Key  (r) = Recorded By, (t) = Taken By, (c) = Cosigned By    Initials Name Effective Dates Discipline    KK Magy Pavon Speech Therapy Student 04/24/19 -  SLP          Outcome Summary  Outcome Summary/Treatment Plan (SLP)  Daily Summary of Progress (SLP): (P) progress toward functional goals is good (06/05/19 0816 : Magy Pavon Speech Therapy  Student)  Barriers to Overall Progress (SLP): (P) none (06/05/19 0816 : Magy Pavon, Speech Therapy Student)  Plan for Continued Treatment (SLP): (P) continue to target goals for least restrictive diet (06/05/19 0816 : Magy Pavon, Speech Therapy Student)  Anticipated Dischage Disposition: (P) home with assist (06/05/19 0816 : Magy Pavon, Speech Therapy Student)  Patient/Family Concerns, Anticipated Discharge Disposition (SLP): (P) none stated (06/05/19 0816 : Magy Pavon, Speech Therapy Student)      SLP GOALS     Row Name 06/05/19 0816 06/04/19 0904 06/03/19 1226       Oral Nutrition/Hydration Goal 1 (SLP)    Oral Nutrition/Hydration Goal 1, SLP  LTG: Patient will tolerate LRD with no overt s/s of aspiration.  (Pended)   -KK  LTG: Patient will tolerate LRD with no overt s/s of aspiration.  -MB  LTG: Patient will tolerate LRD with no overt s/s of aspiration.  -CS (r) GEOFF (t) CS (c)    Time Frame (Oral Nutrition/Hydration Goal 1, SLP)  short term goal (STG);by discharge  (Pended)   -KK  short term goal (STG);by discharge  -MB  short term goal (STG);by discharge  -CS (r) GEOFF (t) CS (c)    Barriers (Oral Nutrition/Hydration Goal 1, SLP)  poor appetite  (Pended)   -KK  poor appetite  -MB  n/a  -CS (r) GEOFF (t) CS (c)    Progress/Outcomes (Oral Nutrition/Hydration Goal 1, SLP)  continuing progress toward goal  (Pended)   -KK  continuing progress toward goal  -MB  continuing progress toward goal  -CS (r) GEOFF (t) CS (c)    Row Name 06/02/19 1045             Oral Nutrition/Hydration Goal 1 (SLP)    Oral Nutrition/Hydration Goal 1, SLP  LTG: Patient will tolerate LRD with no overt s/s of aspiration.  -KW      Time Frame (Oral Nutrition/Hydration Goal 1, SLP)  short term goal (STG);by discharge  -KW      Barriers (Oral Nutrition/Hydration Goal 1, SLP)  n/a  -KW      Progress/Outcomes (Oral Nutrition/Hydration Goal 1, SLP)  goal ongoing  -KW        User Key  (r) = Recorded By, (t) = Taken By, (c) =  Cosigned By    Initials Name Provider Type    Aroldo Cloud, CCC-SLP Speech and Language Pathologist    KW Delfina Reis, MS CCC-SLP Speech and Language Pathologist    Jose Angel Palma, MS CCC-SLP Speech and Language Pathologist    Magy Coronado, Speech Therapy Student Speech Therapy Student    Tanya Kinsey, Speech Therapy Student Speech Therapy Student          EDUCATION  The patient has been educated in the following areas:   Dysphagia (Swallowing Impairment).    SLP Recommendation and Plan  Daily Summary of Progress (SLP): (P) progress toward functional goals is good  Barriers to Overall Progress (SLP): (P) none  Plan for Continued Treatment (SLP): (P) continue to target goals for least restrictive diet  Anticipated Dischage Disposition: (P) home with assist  Patient/Family Concerns, Anticipated Discharge Disposition (SLP): (P) none stated                 Time Calculation:   Time Calculation- SLP     Row Name 06/05/19 0903             Time Calculation- SLP    SLP Start Time  0816  (Pended)   -KK      SLP Stop Time  0846  (Pended)   -      SLP Time Calculation (min)  30 min  (Pended)   -      SLP Received On  06/05/19  (Pended)   -        User Key  (r) = Recorded By, (t) = Taken By, (c) = Cosigned By    Initials Name Provider Type    Magy Coronado, Speech Therapy Student Speech Therapy Student          Therapy Charges for Today     Code Description Service Date Service Provider Modifiers Qty    [id number] HC ST TREATMENT SWALLOW 2 6/5/2019 Magy Pavon, Speech Therapy Student GN 1                 Magy Pavon Speech Therapy Student  6/5/2019

## 2019-06-05 NOTE — THERAPY TREATMENT NOTE
Acute Care - Physical Therapy Treatment Note  [facility]     Patient Name: Karen Snell  : 1945  MRN: [id number]  Today's Date: 2019  Onset of Illness/Injury or Date of Surgery: 19  Date of Referral to PT: 19  Referring Physician: Dr. Gardner    Admit Date: 2019    Visit Dx:    ICD-10-CM ICD-9-CM   1. Inability to walk R26.2 719.7   2. Leukocytosis, unspecified type D72.829 288.60   3. Other dysphagia R13.19 787.29   4. Impaired functional mobility, balance, gait, and endurance Z74.09 V49.89   5. Decreased activities of daily living (ADL) R68.89 780.99     Patient Active Problem List   Diagnosis   • Colon polyps   • Epigastric pain   • History of DVT in adulthood   • Altered mental status   • Hernia, umbilical   • Abnormal finding on imaging   • Inability to walk   • Elevated troponin   • Current use of long term anticoagulation   • Leukocytosis   • Pneumonia due to infectious organism   • Generalized weakness   • Elevated transaminase level       Therapy Treatment    Rehabilitation Treatment Summary     Row Name 19 1041 19 0816          Treatment Time/Intention    Discipline  physical therapy assistant  -  speech language pathologist  -RALEIGH GARRISON MB2     Document Type  therapy note (daily note)  -[facility]  therapy note (daily note)  -RALEIGH GARRISON MB2     Subjective Information  complains of;weakness  -[facility]  no complaints  -RALEIGH GARRISON MB2     Mode of Treatment  physical therapy  -[facility]  speech-language pathology  -RALEIGH GARRISON MB2     Patient/Family Observations  --  no family present  -RALEIGH GARRISON MB2     Patient Effort  --  good  -RALEIGH GARRISON MB2     Existing Precautions/Restrictions  fall  -[facility]  --     Recorded by [] Molly Ybarra, PTA 19 1041  [MF2] Molly Ybarra, PTA 19 1101 [RALEIGH GARRISON,MB2] Aroldo Patel CCC-SLP (r) Magy Pavon, Speech Therapy Student (t) Aroldo Patel CCC-SLP (c) 19 0939     Row Name 19 1041             Bed Mobility Assessment/Treatment     Comment (Bed Mobility)  up in chair  -      Recorded by [MF] Molly Ybarra, PTA 06/05/19 1101      Row Name 06/05/19 1041             Transfer Assessment/Treatment    Comment (Transfers)  after ambulation, assisted pt. with walking to shower and stepping into shower for nursing to bathe her.   -MF      Recorded by [] Molly Ybarra, PTA 06/05/19 1120      Row Name 06/05/19 1041             Sit-Stand Transfer    Sit-Stand [place] (Transfers)  verbal cues;contact guard;minimum assist (75% patient effort)  -      Recorded by [] Molly Ybarra, PTA 06/05/19 1101      Row Name 06/05/19 1041             Stand-Sit Transfer    Stand-Sit [place] (Transfers)  verbal cues;contact guard  -MF      Recorded by [] Molly Ybarra, PTA 06/05/19 1101      Row Name 06/05/19 1041             Toilet Transfer    Type (Toilet Transfer)  stand-sit  -      [place] Level (Toilet Transfer)  contact guard;verbal cues  -      Assistive Device (Toilet Transfer)  grab bars/safety frame shower seat  -MF      Recorded by [] Molly Ybarra, PTA 06/05/19 1120      Row Name 06/05/19 1041             Gait/Stairs Assessment/Training    [place] Level (Gait)  verbal cues;contact guard  -      Assistive Device (Gait)  walker, front-wheeled  -      Distance in Feet (Gait)  50' x 2 with one standing rest  -      Deviations/Abnormal Patterns (Gait)  stride length decreased;corinne decreased  -      Bilateral Gait Deviations  heel strike decreased  -      Recorded by [MF] Molly Ybarra, PTA 06/05/19 1101      Row Name 06/05/19 1041             Therapeutic Exercise    Lower Extremity Range of Motion (Therapeutic Exercise)  ankle dorsiflexion/plantar flexion, bilateral;hip flexion/extension, bilateral LAQs, assisted with hip flexion  -      Exercise Type (Therapeutic Exercise)  AROM (active range of motion);AAROM (active assistive range of motion)  -      Position (Therapeutic  Exercise)  seated  -      Sets/Reps (Therapeutic Exercise)  20  -MF      Recorded by [] Molly Ybarra, \[facility]\"" 06/05/19 1101      Row Name 06/05/19 1041             Positioning and Restraints    Pre-Treatment Position  sitting in chair/recliner  -      Post Treatment Position  bathroom  -MF2      Bathroom  sitting;call light within reach;with nsg  -MF2      Recorded by [] Molly Ybarra, \[facility]\"" 06/05/19 1101  [2] Molly Ybarra, \[facility]\"" 06/05/19 1120      Row Name 06/05/19 0816             Pain Assessment    Additional Documentation  Pain Scale: FACES Pre/Post-Treatment (Group)  -RALEIGH GARRISON,MB2      Recorded by [MB,KK,MB2] Aroldo Patel CCC-SLP (r) Magy Pavon, Speech Therapy Student (t) Aroldo Patel CCC-SLP (c) 06/05/19 0939      Row Name 06/05/19 1041             Pain Scale: Numbers Pre/Post-Treatment    Pain Scale: Numbers, Pretreatment  0/10 - no pain  -      Recorded by [] Molly Ybarra, \[facility]\"" 06/05/19 1101      Row Name 06/05/19 0816             Pain Scale: FACES Pre/Post-Treatment    Pain: FACES Scale, Pretreatment  0-->no hurt  -RALEIGH GARRISON,MB2      Pain: FACES Scale, Post-Treatment  0-->no hurt  -RALEIGH GARRISON,MB2      Recorded by [MB,RALEIGH,MB2] Aroldo Patel CCC-SLP (r) Magy Pavon, Speech Therapy Student (t) Aroldo Patel CCC-SLP (c) 06/05/19 0939      Row Name 06/05/19 0816             Outcome Summary/Treatment Plan (SLP)    Daily Summary of Progress (SLP)  progress toward functional goals is good  -RALEIGH GARRISON,MB2      Barriers to Overall Progress (SLP)  none  -RALEIGH GARRISON,MB2      Plan for Continued Treatment (SLP)  continue to target goals for least restrictive diet  -RALEIGH GARRISON,MB2      Anticipated Dischage Disposition  home with assist  -RALEIGH GARRISON,MB2      Patient/Family Concerns, Anticipated Discharge Disposition (SLP)  none stated  -RALEIGH GARRISON,MB2      Recorded by [MB,KK,MB2] Aroldo Patel, CCC-SLP (r) Magy Pavon, Speech Therapy Student (t) Aroldo Patel, CCC-SLP (c)  06/05/19 0939        User Key  (r) = Recorded By, (t) = Taken By, (c) = Cosigned By    Initials Name Effective Dates Discipline    Aroldo Cloud CCC-SLP 08/02/16 -  SLP    Molly Knapp, PTA 08/02/16 -  PT    Magy Coronado, Speech Therapy Student 04/24/19 -  SLP               Rehab Goal Summary     Row Name 06/05/19 0816             Oral Nutrition/Hydration Goal 1 (SLP)    Oral Nutrition/Hydration Goal 1, SLP  LTG: Patient will tolerate LRD with no overt s/s of aspiration.  -MB (r) KK (t) MB (c)      Time Frame (Oral Nutrition/Hydration Goal 1, SLP)  short term goal (STG);by discharge  -MB (r) KK (t) MB (c)      Barriers (Oral Nutrition/Hydration Goal 1, SLP)  poor appetite  -MB (r) KK (t) MB (c)      Progress/Outcomes (Oral Nutrition/Hydration Goal 1, SLP)  continuing progress toward goal  -MB (r) KK (t) MB (c)        User Key  (r) = Recorded By, (t) = Taken By, (c) = Cosigned By    Initials Name Provider Type Discipline    Aroldo Cloud CCC-SLP Speech and Language Pathologist SLP    Magy Coronado, Speech Therapy Student Speech Therapy Student SLP          Physical Therapy Education     Title: PT OT SLP Therapies (Done)     Topic: Physical Therapy (Done)     Point: Mobility training (Done)     Learning Progress Summary           Patient Acceptance, E,TB,YOLIS, ZULEYKA,JOHN,NR by SAILAJA at 6/3/2019 10:47 AM    Comment:  Education re: purpose of PT and importance of activity; education for improved technique w/ bed mobility, tfers and gait w/ use of rwx.  Education to pace activities due to shortness of breath and fatigue w/ minimal effort.   Family Acceptance, E,TB,D, ZULEYKA,DU,NR by SAILAJA at 6/3/2019 10:47 AM    Comment:  Education re: purpose of PT and importance of activity; education for improved technique w/ bed mobility, tfers and gait w/ use of rwx.  Education to pace activities due to shortness of breath and fatigue w/ minimal effort.                   Point: Precautions (Done)     Learning  Progress Summary           Patient Acceptance, E,TB,D, VU,DU,NR by SAILAJA at 6/3/2019 10:47 AM    Comment:  Education re: purpose of PT and importance of activity; education for improved technique w/ bed mobility, tfers and gait w/ use of rwx.  Education to pace activities due to shortness of breath and fatigue w/ minimal effort.   Family Acceptance, E,TB,D, ZULEYKA,DU,NR by SAILAJA at 6/3/2019 10:47 AM    Comment:  Education re: purpose of PT and importance of activity; education for improved technique w/ bed mobility, tfers and gait w/ use of rwx.  Education to pace activities due to shortness of breath and fatigue w/ minimal effort.                               User Key     Initials Effective Dates Name Provider Type Discipline     08/02/18 -  Racheal Dorantes, PT Physical Therapist PT                PT Recommendation and Plan     Plan of Care Reviewed With: patient  Progress: improving  Outcome Summary: Pt. doing well with therapy. Pt. was CGA for transfers and gait-she was slow, but able. She was able to walk 50 x 2 with one standing rest. She was quite fatigued following activity. Pt. would benefit from therapy following discharge.   Outcome Measures     Row Name 06/03/19 1000             How much help from another is currently needed...    Putting on and taking off regular lower body clothing?  2  -CH      Bathing (including washing, rinsing, and drying)  2  -CH      Toileting (which includes using toilet bed pan or urinal)  2  -CH      Putting on and taking off regular upper body clothing  3  -CH      Taking care of personal grooming (such as brushing teeth)  4  -CH      Eating meals  4  -      Score  17  -         Functional Assessment    Outcome Measure Options  AM-PAC 6 Clicks Daily Activity (OT)  -        User Key  (r) = Recorded By, (t) = Taken By, (c) = Cosigned By    Initials Name Provider Type    CH Gaviota Beck, OTR/L Occupational Therapist         Time Calculation:   PT Charges     Row Name 06/05/19  1122             Time Calculation    Start Time  1041  -      Stop Time  1120  -MF      Time Calculation (min)  39 min  -MF      PT Received On  06/05/19  -      PT Goal Re-Cert Due Date  06/13/19  -         Time Calculation- PT    Total Timed Code Minutes- PT  39 minute(s)  -         Timed Charges    26408 - PT Therapeutic Exercise Minutes  12  -MF      31839 - Gait Training Minutes   27  -MF        User Key  (r) = Recorded By, (t) = Taken By, (c) = Cosigned By    Initials Name Provider Type    Molly Knapp PTA Physical Therapy Assistant        Therapy Charges for Today     Code Description Service Date Service Provider Modifiers Qty    92888061816 HC PT THER PROC EA 15 MIN 6/5/2019 Molly Ybarra PTA GP 1    17883763394 HC GAIT TRAINING EA 15 MIN 6/5/2019 Molly Ybarra PTA GP 2          PT G-Codes  Outcome Measure Options: AM-PAC 6 Clicks Daily Activity (OT)  Score: 17    Molly Ybarra PTA  6/5/2019

## 2019-06-06 NOTE — PLAN OF CARE
Problem: Patient Care Overview  Goal: Plan of Care Review  Outcome: Ongoing (interventions implemented as appropriate)   06/06/19 1006   Coping/Psychosocial   Plan of Care Reviewed With patient;family   Plan of Care Review   Progress no change   OTHER   Outcome Summary Pt completed trials of thin liquids via cup with no overt s/s of aspiration. Her vocal quality remained clear. She reported that she has not been coughing with thin liquids via cup. SLP educated pt/family on silent aspiration and the option of completing VFSS in radiology to further assess swallow function and r/o increased risk of silent aspiration. Pt's family stated the pt does cough/choke somewhat frequently at home and that she would prefer the pt have the VFSS completed. The pt was in agreement with this plan. SLP will order study for today.

## 2019-06-06 NOTE — THERAPY TREATMENT NOTE
Acute Care - Physical Therapy Treatment Note  [facility]     Patient Name: Karen Snell  : 1945  MRN: [id number]  Today's Date: 2019  Onset of Illness/Injury or Date of Surgery: 19  Date of Referral to PT: 19  Referring Physician: Dr. Gardner    Admit Date: 2019    Visit Dx:    ICD-10-CM ICD-9-CM   1. Inability to walk R26.2 719.7   2. Leukocytosis, unspecified type D72.829 288.60   3. Other dysphagia R13.19 787.29   4. Impaired functional mobility, balance, gait, and endurance Z74.09 V49.89   5. Decreased activities of daily living (ADL) R68.89 780.99     Patient Active Problem List   Diagnosis   • Colon polyps   • Epigastric pain   • History of DVT in adulthood   • Altered mental status   • Hernia, umbilical   • Abnormal finding on imaging   • Inability to walk   • Elevated troponin   • Current use of long term anticoagulation   • Leukocytosis   • Pneumonia due to infectious organism   • Generalized weakness   • Elevated transaminase level       Therapy Treatment    Rehabilitation Treatment Summary     Row Name 19 1156 19 0950          Treatment Time/Intention    Discipline  physical therapy assistant  -  speech language pathologist  -MB     Document Type  therapy note (daily note)  -  therapy note (daily note)  -MB     Subjective Information  complains of;weakness;fatigue  -  no complaints  -MB     Mode of Treatment  physical therapy  -  speech-language pathology  -MB     Patient/Family Observations  --  Female family member present  -MB     Patient Effort  --  good  -MB     Existing Precautions/Restrictions  fall  -  --     Recorded by [] Molly Ybarra PTA 19 1350 [MB] Aroldo Patel CCC-SLP 19 1005     Row Name 19 1156             Bed Mobility Assessment/Treatment    Comment (Bed Mobility)  sitting EOB upon entering room   -      Recorded by [] Molly Ybarra PTA 19 1350      Row Name 19 1156              Transfer Assessment/Treatment    Comment (Transfers)  [place] [facility] [place] daughter in law assisted her back to EOB when she was finished. Then PTA walked pt.   -MF      Recorded by [] Molly Ybarra, [facility] 06/06/19 1350      Row Name 06/06/19 1156             Sit-Stand Transfer    Sit-Stand [place] (Transfers)  contact guard;stand by assist  -MF      Recorded by [] Molly Ybarra, [facility] 06/06/19 1350      Row Name 06/06/19 1156             Stand-Sit Transfer    Stand-Sit [place] (Transfers)  contact guard;stand by assist  -MF      Recorded by [] Molly Ybarra, [facility] 06/06/19 1350      Row Name 06/06/19 1156             Toilet Transfer    Type (Toilet Transfer)  stand pivot/stand step  -MF      [place] Level (Toilet Transfer)  contact guard  -      Assistive Device (Toilet Transfer)  commode, bedside without drop arms  -MF      Recorded by [MF] Molly Ybarra, [facility] 06/06/19 1350      Row Name 06/06/19 1156             Gait/Stairs Assessment/Training    [place] Level (Gait)  contact guard  -      Assistive Device (Gait)  walker, front-wheeled  -MF      Distance in Feet (Gait)  30  -MF      Deviations/Abnormal Patterns (Gait)  stride length decreased;corinne decreased  -MF      Bilateral Gait Deviations  forward flexed posture;heel strike decreased  -MF      Comment (Gait/Stairs)  Pt. was apparently more tired and weak this morning and was unable to walk further.   -MF      Recorded by [MF] Molly Ybarra, [facility] 06/06/19 1350      Row Name 06/06/19 1156             Positioning and Restraints    Pre-Treatment Position  in bed  -MF      Post Treatment Position  chair  -MF      In Chair  sitting;call light within reach;encouraged to call for assist  -MF      Recorded by [] Molly Ybarra, [facility] 06/06/19 1350      Row Name 06/06/19 1156             Pain Scale: Numbers Pre/Post-Treatment    Pain Scale: Numbers, Pretreatment  0/10 - no pain  -      Pain Scale:  Numbers, Post-Treatment  0/10 - no pain  -MF      Recorded by [MF] Molly Ybarra, PTA 06/06/19 1350      Row Name 06/06/19 0950             Pain Scale: FACES Pre/Post-Treatment    Pain: FACES Scale, Pretreatment  0-->no hurt  -MB      Recorded by [MB] Aroldo Patel CCC-SLP 06/06/19 1005      Row Name 06/06/19 0800             Respiratory WDL    Rhythm/Pattern, Respiratory  shallow Simultaneous filing. User may not have seen previous data.  -MB,BL      Recorded by [MB,BL] Aroldo Patel CCC-SLP (r) Karly Bowers RN (t) 06/06/19 1005      Row Name 06/06/19 0950             Outcome Summary/Treatment Plan (SLP)    Daily Summary of Progress (SLP)  progress toward functional goals is good  -MB      Barriers to Overall Progress (SLP)  none  -MB      Plan for Continued Treatment (SLP)  VFSS today to r/o silent aspiration  -MB      Anticipated Dischage Disposition  home with assist  -MB      Recorded by [MB] Aroldo Patel CCC-SLP 06/06/19 1005        User Key  (r) = Recorded By, (t) = Taken By, (c) = Cosigned By    Initials Name Effective Dates Discipline    Aroldo Cloud CCC-SLP 08/02/16 -  SLP    Molly Knapp, PTA 08/02/16 -  PT    Karly Blackman RN 07/26/16 -  Nurse               Rehab Goal Summary     Row Name 06/06/19 0950             Oral Nutrition/Hydration Goal 1 (SLP)    Oral Nutrition/Hydration Goal 1, SLP  LTG: Patient will tolerate LRD with no overt s/s of aspiration.  -MB      Time Frame (Oral Nutrition/Hydration Goal 1, SLP)  short term goal (STG);by discharge  -MB      Barriers (Oral Nutrition/Hydration Goal 1, SLP)  none  -MB      Progress/Outcomes (Oral Nutrition/Hydration Goal 1, SLP)  continuing progress toward goal  -MB        User Key  (r) = Recorded By, (t) = Taken By, (c) = Cosigned By    Initials Name Provider Type Discipline    Aroldo Cloud CCC-SLP Speech and Language Pathologist SLP          Physical Therapy Education     Title: PT OT SLP Therapies  (Done)     Topic: Physical Therapy (Done)     Point: Mobility training (Done)     Learning Progress Summary           Patient Acceptance, E,TB,D, VU,DU,NR by SAILAJA at 6/3/2019 10:47 AM    Comment:  Education re: purpose of PT and importance of activity; education for improved technique w/ bed mobility, tfers and gait w/ use of rwx.  Education to pace activities due to shortness of breath and fatigue w/ minimal effort.   Family Acceptance, E,TB,D, VU,DU,NR by SAILAJA at 6/3/2019 10:47 AM    Comment:  Education re: purpose of PT and importance of activity; education for improved technique w/ bed mobility, tfers and gait w/ use of rwx.  Education to pace activities due to shortness of breath and fatigue w/ minimal effort.                   Point: Precautions (Done)     Learning Progress Summary           Patient Acceptance, E,TB,D, VU,DU,NR by SAILAJA at 6/3/2019 10:47 AM    Comment:  Education re: purpose of PT and importance of activity; education for improved technique w/ bed mobility, tfers and gait w/ use of rwx.  Education to pace activities due to shortness of breath and fatigue w/ minimal effort.   Family Acceptance, E,TB,D, VU,DU,NR by SAILAJA at 6/3/2019 10:47 AM    Comment:  Education re: purpose of PT and importance of activity; education for improved technique w/ bed mobility, tfers and gait w/ use of rwx.  Education to pace activities due to shortness of breath and fatigue w/ minimal effort.                               User Key     Initials Effective Dates Name Provider Type Discipline     08/02/18 -  Racheal Dorantes, PT Physical Therapist PT                PT Recommendation and Plan     Plan of Care Reviewed With: patient  Progress: no change  Outcome Summary: Pt. agreeable to therapy. Pt. continues to be CGA for transfers and ambulation. She is only able to walk short distances and fatigues quickly. Will continue to work on strength and endurance.   Outcome Measures     Row Name 06/05/19 1200             How much  help from another is currently needed...    Putting on and taking off regular lower body clothing?  3  -TS      Bathing (including washing, rinsing, and drying)  3  -TS      Toileting (which includes using toilet bed pan or urinal)  3  -TS      Putting on and taking off regular upper body clothing  4  -TS      Taking care of personal grooming (such as brushing teeth)  4  -TS      Eating meals  4  -TS      Score  21  -TS         Functional Assessment    Outcome Measure Options  AM-PAC 6 Clicks Daily Activity (OT)  -TS        User Key  (r) = Recorded By, (t) = Taken By, (c) = Cosigned By    Initials Name Provider Type    TS eZnia Ibarra, MILLIE/L Occupational Therapy Assistant         Time Calculation:   PT Charges     Row Name 06/06/19 1352             Time Calculation    Start Time  1156  -      Stop Time  1219  -      Time Calculation (min)  23 min  -      PT Non-Billable Time (min)  5 min  -      PT Received On  06/06/19  -      PT Goal Re-Cert Due Date  06/13/19  -         Time Calculation- PT    Total Timed Code Minutes- PT  18 minute(s)  -         Timed Charges    01726 - Gait Training Minutes   18  -MF        User Key  (r) = Recorded By, (t) = Taken By, (c) = Cosigned By    Initials Name Provider Type     Molly Ybarra PTA Physical Therapy Assistant        Therapy Charges for Today     Code Description Service Date Service Provider Modifiers Qty    70409294179 HC PT THER PROC EA 15 MIN 6/5/2019 Molly Ybarra, PTA GP 1    47303300188 HC GAIT TRAINING EA 15 MIN 6/5/2019 Molly Ybarra, PTA GP 2    74001805254 HC GAIT TRAINING EA 15 MIN 6/5/2019 Molly Ybarra, PTA GP 1    56751244050 HC PT THERAPEUTIC ACT EA 15 MIN 6/5/2019 Molly Ybarra, PTA GP 1    79403802334 HC GAIT TRAINING EA 15 MIN 6/6/2019 Molly Ybarra, PTA GP 1          PT G-Codes  Outcome Measure Options: AM-PAC 6 Clicks Daily Activity (OT)  Score: 21    Molly Ybarra PTA  6/6/2019

## 2019-06-06 NOTE — THERAPY TREATMENT NOTE
Acute Care - Speech Language Pathology   Swallow Treatment Note [facility]     Patient Name: Karen Snell  : 1945  MRN: [id number]  Today's Date: 2019  Onset of Illness/Injury or Date of Surgery: 19     Referring Physician: Dr. Gardner      Admit Date: 2019  Pt completed trials of thin liquids via cup with no overt s/s of aspiration. Her vocal quality remained clear. She reported that she has not been coughing with thin liquids via cup. SLP educated pt/family on silent aspiration and the option of completing VFSS in radiology to further assess swallow function and r/o increased risk of silent aspiration. Pt's family stated the pt does cough/choke somewhat frequently at home and that she would prefer the pt have the VFSS completed. The pt was in agreement with this plan. SLP will order study for today.  Aroldo Patel, CCC-SLP 2019 10:09 AM    Visit Dx:      ICD-10-CM ICD-9-CM   1. Inability to walk R26.2 719.7   2. Leukocytosis, unspecified type D72.829 288.60   3. Other dysphagia R13.19 787.29   4. Impaired functional mobility, balance, gait, and endurance Z74.09 V49.89   5. Decreased activities of daily living (ADL) R68.89 780.99     Patient Active Problem List   Diagnosis   • Colon polyps   • Epigastric pain   • History of DVT in adulthood   • Altered mental status   • Hernia, umbilical   • Abnormal finding on imaging   • Inability to walk   • Elevated troponin   • Current use of long term anticoagulation   • Leukocytosis   • Pneumonia due to infectious organism   • Generalized weakness   • Elevated transaminase level       Therapy Treatment  Rehabilitation Treatment Summary     Row Name 19 0950             Treatment Time/Intention    Discipline  speech language pathologist  -MB      Document Type  therapy note (daily note)  -MB      Subjective Information  no complaints  -MB      Mode of Treatment  speech-language pathology  -MB      Patient/Family Observations  Female family  member present  -MB      Patient Effort  good  -MB      Recorded by [MB] Aroldo Patel CCC-SLP 06/06/19 1005      Row Name 06/06/19 0950             Pain Scale: FACES Pre/Post-Treatment    Pain: FACES Scale, Pretreatment  0-->no hurt  -MB      Recorded by [MB] Aroldo Patel CCC-SLP 06/06/19 1005      Row Name 06/06/19 0800             Respiratory WDL    Rhythm/Pattern, Respiratory  shallow Simultaneous filing. User may not have seen previous data.  -MB,BL      Recorded by [MB,BL] Aroldo Patel CCC-SLP (r) Karly Bowers RN (t) 06/06/19 1005      Row Name 06/06/19 0950             Outcome Summary/Treatment Plan (SLP)    Daily Summary of Progress (SLP)  progress toward functional goals is good  -MB      Barriers to Overall Progress (SLP)  none  -MB      Plan for Continued Treatment (SLP)  VFSS today to r/o silent aspiration  -MB      Anticipated Dischage Disposition  home with assist  -MB      Recorded by [MB] Aroldo Patel CCC-SLP 06/06/19 1005        User Key  (r) = Recorded By, (t) = Taken By, (c) = Cosigned By    Initials Name Effective Dates Discipline    MB Aroldo Patel CCC-SLP 08/02/16 -  SLP    Karly Blackman RN 07/26/16 -  Nurse          Outcome Summary  Outcome Summary/Treatment Plan (SLP)  Daily Summary of Progress (SLP): progress toward functional goals is good (06/06/19 0950 : Aroldo Patel CCC-SLP)  Barriers to Overall Progress (SLP): none (06/06/19 0950 : Aroldo Patel CCC-SLP)  Plan for Continued Treatment (SLP): VFSS today to r/o silent aspiration (06/06/19 0950 : Aroldo Patel CCC-SLP)  Anticipated Dischage Disposition: home with assist (06/06/19 0950 : Aroldo Patel CCC-SLP)      SLP GOALS     Row Name 06/06/19 0950 06/05/19 0816 06/04/19 0904       Oral Nutrition/Hydration Goal 1 (SLP)    Oral Nutrition/Hydration Goal 1, SLP  LTG: Patient will tolerate LRD with no overt s/s of aspiration.  -MB  LTG: Patient will tolerate LRD with no overt s/s of  aspiration.  -MB (r) KK (t) MB (c)  LTG: Patient will tolerate LRD with no overt s/s of aspiration.  -MB    Time Frame (Oral Nutrition/Hydration Goal 1, SLP)  short term goal (STG);by discharge  -MB  short term goal (STG);by discharge  -MB (r) KK (t) MB (c)  short term goal (STG);by discharge  -MB    Barriers (Oral Nutrition/Hydration Goal 1, SLP)  none  -MB  poor appetite  -MB (r) KK (t) MB (c)  poor appetite  -MB    Progress/Outcomes (Oral Nutrition/Hydration Goal 1, SLP)  continuing progress toward goal  -MB  continuing progress toward goal  -MB (r) KK (t) MB (c)  continuing progress toward goal  -MB    Row Name 06/03/19 1226             Oral Nutrition/Hydration Goal 1 (SLP)    Oral Nutrition/Hydration Goal 1, SLP  LTG: Patient will tolerate LRD with no overt s/s of aspiration.  -CS (r) GEOFF (t) CS (c)      Time Frame (Oral Nutrition/Hydration Goal 1, SLP)  short term goal (STG);by discharge  -CS (r) GEOFF (t) CS (c)      Barriers (Oral Nutrition/Hydration Goal 1, SLP)  n/a  -CS (r) GEOFF (t) CS (c)      Progress/Outcomes (Oral Nutrition/Hydration Goal 1, SLP)  continuing progress toward goal  -CS (r) GEOFF (t) CS (c)        User Key  (r) = Recorded By, (t) = Taken By, (c) = Cosigned By    Initials Name Provider Type    Aroldo Cloud, CCC-SLP Speech and Language Pathologist    Jose Angel Palma, MS CCC-SLP Speech and Language Pathologist    Magy Coronado, Speech Therapy Student Speech Therapy Student    Tanya Kinsey, Speech Therapy Student Speech Therapy Student          EDUCATION  The patient has been educated in the following areas:   Dysphagia (Swallowing Impairment).    SLP Recommendation and Plan  Daily Summary of Progress (SLP): progress toward functional goals is good  Barriers to Overall Progress (SLP): none  Plan for Continued Treatment (SLP): VFSS today to r/o silent aspiration  Anticipated Dischage Disposition: home with assist                    Time Calculation:   Time Calculation- SLP      Row Name 06/06/19 1008             Time Calculation- SLP    SLP Start Time  0950  -MB      SLP Stop Time  0958  -MB      SLP Time Calculation (min)  8 min  -MB      SLP Received On  06/06/19  -MB        User Key  (r) = Recorded By, (t) = Taken By, (c) = Cosigned By    Initials Name Provider Type    Aroldo Cloud CCC-SLP Speech and Language Pathologist          Therapy Charges for Today     Code Description Service Date Service Provider Modifiers Qty    [id number]  ST TREATMENT SWALLOW 1 6/6/2019 Aroldo Patel CCC-SLP GN 1                 RICK Eduardo  6/6/2019

## 2019-06-06 NOTE — MBS/VFSS/FEES
Acute Care - Speech Language Pathology   Swallow VFSS in Radiology [facility]     Patient Name: Karen Snell  : 1945  MRN: [id number]  Today's Date: 2019  Onset of Illness/Injury or Date of Surgery: 19     Referring Physician: Dr. Gardner      Admit Date: 2019     VFSS completed. Pt was presented the following consistencies in the stated order: honey thick, nectar thick (straw), nectar thick (cup), thin (straw), pudding thick, mechanical soft, and thin (cup). Decreased bolus formation was observed with all presented trials. Decreased base of tongue strength was observed with all presented trials. A delay in swallow initation was noted with thin and mechanical soft. Essentially no laryngeal elevation, yet functional epiglottic inversion. Piecemeal swallows when trials were consumed via cup. 1x instance of trace penetration during a multiple swallow following the nectar thick trial via straw. Trace penetration with each of the thin liquid trials, without definite aspiration. Each instance of laryngeal penetration was observed to be silent. Prolonged mastication with the mechanical soft trial; therefore, the regular solid was not presented. Post swallow residue was felt to be mild to moderate lingually, as well as in  the vallecula. RECOMMENDATIONS: Change to mechanical soft diet consistency to allow for improved ability to breakdown the bolus in a more time efficient manner, continue with thin liquids; meds whole with thin liquids; RN to monitor for increased congestion; Ok for liquid consumption via cup or straw (as ST previously recommended to avoid utilization of straw). ST to continue to monitor PRN. Thanks! Letitia Redding CCC-SLP 2019 4:51 PM    Visit Dx:     ICD-10-CM ICD-9-CM   1. Inability to walk R26.2 719.7   2. Leukocytosis, unspecified type D72.829 288.60   3. Other dysphagia R13.19 787.29   4. Impaired functional mobility, balance, gait, and endurance Z74.09 V49.89   5.  Decreased activities of daily living (ADL) R68.89 780.99     Patient Active Problem List   Diagnosis   • Colon polyps   • Epigastric pain   • History of DVT in adulthood   • Altered mental status   • Hernia, umbilical   • Abnormal finding on imaging   • Inability to walk   • Elevated troponin   • Current use of long term anticoagulation   • Leukocytosis   • Pneumonia due to infectious organism   • Generalized weakness   • Elevated transaminase level     Past Medical History:   Diagnosis Date   • Arthritis    • Colon polyp    • DVT (deep venous thrombosis) (CMS/HCC)    • GERD (gastroesophageal reflux disease)    • Hypertension    • PE (pulmonary thromboembolism) (CMS/HCC)    • Sleep apnea      Past Surgical History:   Procedure Laterality Date   • CHOLECYSTECTOMY     • COLONOSCOPY  09/24/2013   • COLONOSCOPY N/A 8/10/2017    Procedure: COLONOSCOPY WITH ANESTHESIA;  Surgeon: Jenna Page MD;  Location: [facility] ENDOSCOPY;  Service:    • ENDOSCOPY N/A 8/10/2017    Procedure: ESOPHAGOGASTRODUODENOSCOPY WITH ANESTHESIA;  Surgeon: Jenna Page MD;  Location: [facility] ENDOSCOPY;  Service:    • JOINT REPLACEMENT          SWALLOW EVALUATION (last 72 hours)      SLP Adult Swallow Evaluation     Row Name 06/06/19 1530                   Rehab Evaluation    Document Type  evaluation  -TM        Subjective Information  no complaints  -TM        Patient Observations  alert;cooperative  -TM        Patient/Family Observations  Pt attended the study independently.   -TM        Patient Effort  adequate  -TM           General Information    Patient Profile Reviewed  yes  -TM        Pertinent History Of Current Problem  CXR 06/03/2019 Minimal hazy infiltrate at the right lung base. Pt and family reported coughing with thin liquids.   -TM        Current Method of Nutrition  regular textures;thin liquids  -TM        Precautions/Limitations, Vision  WFL with corrective lenses  -TM        Precautions/Limitations, Hearing  WFL  -TM         Prior Level of Function-Communication  WFL  -TM        Prior Level of Function-Swallowing  no diet consistency restrictions  -TM        Plans/Goals Discussed with  patient  -TM        Barriers to Rehab  none identified  -TM        Patient's Goals for Discharge  patient did not state  -TM           Pain Assessment    Additional Documentation  Pain Scale: Numbers Pre/Post-Treatment (Group)  -TM           Pain Scale: Numbers Pre/Post-Treatment    Pain Scale: Numbers, Post-Treatment  8/10  -TM        Pain Location - Side  Left  -TM        Pain Location  back  -TM        Pain Intervention(s)  Repositioned  -TM           Oral Motor and Function    Dentition Assessment  missing teeth  -TM        Secretion Management  WNL/WFL  -TM        Mucosal Quality  moist, healthy  -TM        Volitional Swallow  WFL  -TM        Volitional Cough  WFL  -TM           Oral Musculature and Cranial Nerve Assessment    Oral Motor General Assessment  WFL  -TM           General Eating/Swallowing Observations    Respiratory Support Currently in Use  room air  -TM        Positioning During Eating  upright 90 degree;upright in chair  -TM           MBS/VFSS    Utensils Used  spoon;cup;straw  -TM        Consistencies Trialed  pudding thick;honey-thick liquids;nectar/syrup-thick liquids;soft textures  -TM           MBS/VFSS Interpretation    Oral Prep Phase  impaired oral phase of swallowing  -TM        Oral Transit Phase  impaired  -TM        Oral Residue  WFL  -TM        VFSS Summary  VFSS completed. Pt was presented the following consistencies in the stated order: honey thick, nectar thick (straw), nectar thick (cup), thin (straw), pudding thick, mechanical soft, and thin (cup). Decreased bolus formation was observed with all presented trials. Decreased base of tongue strength was observed with all presented trials. A delay in swallow initation was noted with thin and mechanical soft. Essentially no laryngeal elevation, yet functional epiglottic  inversion. Piecemeal swallows when trials were consumed via cup. 1x instance of trace penetration during a multiple swallow following the nectar thick trial via straw. Trace penetration with each of the thin liquid trials, without definite aspiration. Each instance of laryngeal penetration was observed to be silent. Prolonged mastication with the mechanical soft trial; therefore, the regular solid was not presented. Post swallow residue was felt to be mild to moderate lingually, as well as in  the vallecula.   -TM           Oral Preparatory Phase    Oral Preparatory Phase  prolonged manipulation  -TM        Prolonged Manipulation  mechanical soft  -TM           Oral Transit Phase    Impaired Oral Transit Phase  premature spillage of liquids into pharynx  -TM        Premature Spillage of Liquids into Pharynx  all consistencies tested  -TM           Initiation of Pharyngeal Swallow    Initiation of Pharyngeal Swallow  bolus in valleculae  -TM        Pharyngeal Phase  impaired pharyngeal phase of swallowing  -TM        Penetration During the Swallow  nectar-thick liquids;thin liquids  -TM        Response to Penetration  no response  -TM        Pharyngeal Residue  base of tongue;valleculae;laryngeal vestibule  -TM        Attempted Compensatory Maneuvers  multiple swallows  -TM           Clinical Impression    SLP Swallowing Diagnosis  mild;functional oral phase;pharyngeal dysfunction  -TM        Functional Impact  risk of aspiration/pneumonia  -TM        Rehab Potential/Prognosis, Swallowing  good, to achieve stated therapy goals  -TM        Swallow Criteria for Skilled Therapeutic Interventions Met  demonstrates skilled criteria  -TM           Recommendations    Therapy Frequency (Swallow)  PRN  -TM        Predicted Duration Therapy Intervention (Days)  until discharge  -TM        SLP Diet Recommendation  soft textures;thin liquids  -TM        Recommended Precautions and Strategies  small bites of food and sips of  liquid;upright posture during/after eating  -TM        SLP Rec. for Method of Medication Administration  meds whole;with thin liquids  -TM        Monitor for Signs of Aspiration  notify SLP if any concerns  -TM        Anticipated Dischage Disposition  home with assist  -TM           Swallow Goals (SLP)    Oral Nutrition/Hydration Goal Selection (SLP)  oral nutrition/hydration, SLP goal 1  -TM           Oral Nutrition/Hydration Goal 1 (SLP)    Oral Nutrition/Hydration Goal 1, SLP  LTG: Patient will tolerate LRD with no overt s/s of aspiration.  -TM        Time Frame (Oral Nutrition/Hydration Goal 1, SLP)  short term goal (STG);by discharge  -TM        Barriers (Oral Nutrition/Hydration Goal 1, SLP)  none  -TM        Progress/Outcomes (Oral Nutrition/Hydration Goal 1, SLP)  goal ongoing  -TM          User Key  (r) = Recorded By, (t) = Taken By, (c) = Cosigned By    Initials Name Effective Dates    TM Letitia Redding CCC-SLP 08/02/16 -           EDUCATION  The patient has been educated in the following areas:   Dysphagia (Swallowing Impairment).    SLP Recommendation and Plan  SLP Swallowing Diagnosis: mild, functional oral phase, pharyngeal dysfunction  SLP Diet Recommendation: soft textures, thin liquids  Recommended Precautions and Strategies: small bites of food and sips of liquid, upright posture during/after eating  SLP Rec. for Method of Medication Administration: meds whole, with thin liquids     Monitor for Signs of Aspiration: notify SLP if any concerns     Swallow Criteria for Skilled Therapeutic Interventions Met: demonstrates skilled criteria  Anticipated Dischage Disposition: home with assist  Rehab Potential/Prognosis, Swallowing: good, to achieve stated therapy goals  Therapy Frequency (Swallow): PRN  Predicted Duration Therapy Intervention (Days): until discharge       Plan of Care Reviewed With: patient  Plan of Care Review  Plan of Care Reviewed With: patient  Progress: (Eval)  Outcome Summary: VFSS  completed. Pt was presented the following consistencies in the stated order: honey thick, nectar thick (straw), nectar thick (cup), thin (straw), pudding thick, mechanical soft, and thin (cup). Decreased bolus formation was observed with all presented trials. Decreased base of tongue strength was observed with all presented trials. A delay in swallow initation was noted with thin and mechanical soft. Essentially no laryngeal elevation, yet functional epiglottic inversion. Piecemeal swallows when trials were consumed via cup. 1x instance of trace penetration during a multiple swallow following the nectar thick trial via straw. Trace penetration with each of the thin liquid trials, without definite aspiration. Each instance of laryngeal penetration was observed to be silent. Prolonged mastication with the mechanical soft trial; therefore, the regular solid was not presented. Post swallow residue was felt to be mild to moderate lingually, as well as in  the vallecula. RECOMMENDATIONS: Change to mechanical soft diet consistency to allow for improved ability to breakdown the bolus in a more time efficient manner, continue with thin liquids; meds whole with thin liquids; RN to monitor for increased congestion; Ok for liquid consumption via cup or straw (as ST previously recommended to avoid utilization of straw). ST to continue to monitor PRN. Thanks!     SLP GOALS     Row Name 06/06/19 1530 06/06/19 0950 06/05/19 0816       Oral Nutrition/Hydration Goal 1 (SLP)    Oral Nutrition/Hydration Goal 1, SLP  LTG: Patient will tolerate LRD with no overt s/s of aspiration.  -TM  LTG: Patient will tolerate LRD with no overt s/s of aspiration.  -MB  LTG: Patient will tolerate LRD with no overt s/s of aspiration.  -MB (r) KK (t) MB (c)    Time Frame (Oral Nutrition/Hydration Goal 1, SLP)  short term goal (STG);by discharge  -TM  short term goal (STG);by discharge  -MB  short term goal (STG);by discharge  -MB (r) KK (t) MB (c)     Barriers (Oral Nutrition/Hydration Goal 1, SLP)  none  -TM  none  -MB  poor appetite  -MB (r) KK (t) MB (c)    Progress/Outcomes (Oral Nutrition/Hydration Goal 1, SLP)  goal ongoing  -TM  continuing progress toward goal  -MB  continuing progress toward goal  -MB (r) KK (t) MB (c)    Row Name 06/04/19 0904             Oral Nutrition/Hydration Goal 1 (SLP)    Oral Nutrition/Hydration Goal 1, SLP  LTG: Patient will tolerate LRD with no overt s/s of aspiration.  -MB      Time Frame (Oral Nutrition/Hydration Goal 1, SLP)  short term goal (STG);by discharge  -MB      Barriers (Oral Nutrition/Hydration Goal 1, SLP)  poor appetite  -MB      Progress/Outcomes (Oral Nutrition/Hydration Goal 1, SLP)  continuing progress toward goal  -MB        User Key  (r) = Recorded By, (t) = Taken By, (c) = Cosigned By    Initials Name Provider Type    Aroldo Cloud CCC-SLP Speech and Language Pathologist     Letitia Redding CCC-SLP Speech and Language Pathologist    KK Magy Pavon, Speech Therapy Student Speech Therapy Student             Time Calculation:   Time Calculation- SLP     Row Name 06/06/19 1649 06/06/19 1008          Time Calculation- SLP    SLP Start Time  1530  -TM  0950  -MB     SLP Stop Time  1653  -TM  0958  -MB     SLP Time Calculation (min)  83 min  -TM  8 min  -MB     SLP Received On  06/06/19  -TM  06/06/19  -MB     SLP Goal Re-Cert Due Date  06/16/19  -TM  --       User Key  (r) = Recorded By, (t) = Taken By, (c) = Cosigned By    Initials Name Provider Type    Aroldo Cloud CCC-SLP Speech and Language Pathologist    Letitia Tatum CCC-SLP Speech and Language Pathologist          Therapy Charges for Today     Code Description Service Date Service Provider Modifiers Qty    71270428600 HC ST MOTION FLUORO EVAL SWALLOW 6 6/6/2019 Letitia Redding CCC-BENJAMIN GN 1               Letitia A. Redding, CCC-SLP  6/6/2019

## 2019-06-06 NOTE — PLAN OF CARE
Problem: Patient Care Overview  Goal: Plan of Care Review  Outcome: Ongoing (interventions implemented as appropriate)   06/06/19 8110   Coping/Psychosocial   Plan of Care Reviewed With patient   Plan of Care Review   Progress no change   OTHER   Outcome Summary Pt. agreeable to therapy. Pt. continues to be CGA for transfers and ambulation. She is only able to walk short distances and fatigues quickly. Will continue to work on strength and endurance.

## 2019-06-06 NOTE — PLAN OF CARE
Problem: Patient Care Overview  Goal: Plan of Care Review  Outcome: Ongoing (interventions implemented as appropriate)   06/06/19 1519   Coping/Psychosocial   Plan of Care Reviewed With patient   OTHER   Outcome Summary Pt. progressing good with her ue exs, no issues or problems from exs! Pain from previous issues!

## 2019-06-06 NOTE — PLAN OF CARE
Problem: Fall Risk (Adult)  Goal: Absence of Fall  Outcome: Ongoing (interventions implemented as appropriate)   06/06/19 0324   Fall Risk (Adult)   Absence of Fall making progress toward outcome       Problem: Patient Care Overview  Goal: Plan of Care Review   06/06/19 0324   Coping/Psychosocial   Plan of Care Reviewed With patient   Plan of Care Review   Progress improving   OTHER   Outcome Summary No c/o pain. Up to bedside commode. VSS. Pt ROM limited because of osteoarthritis in shoulders, and generalized/ dependent edema. Pt is incontinent at times. Safety maintained. Will continue to monitor.       Problem: Pneumonia (Adult)  Goal: Signs and Symptoms of Listed Potential Problems Will be Absent, Minimized or Managed (Pneumonia)  Outcome: Ongoing (interventions implemented as appropriate)   06/06/19 0324   Goal/Outcome Evaluation   Problems Assessed (Pneumonia) all   Problems Present (Pneumonia) respiratory compromise

## 2019-06-06 NOTE — PLAN OF CARE
Problem: Patient Care Overview  Goal: Plan of Care Review  Outcome: Ongoing (interventions implemented as appropriate)   06/06/19 8321   Coping/Psychosocial   Plan of Care Reviewed With patient   Plan of Care Review   Progress (Eval)   OTHER   Outcome Summary VFSS completed. Pt was presented the following consistencies in the stated order: honey thick, nectar thick (straw), nectar thick (cup), thin (straw), pudding thick, mechanical soft, and thin (cup). Decreased bolus formation was observed with all presented trials. Decreased base of tongue strength was observed with all presented trials. A delay in swallow initation was noted with thin and mechanical soft. Essentially no laryngeal elevation, yet functional epiglottic inversion. Piecemeal swallows when trials were consumed via cup. 1x instance of trace penetration during a multiple swallow following the nectar thick trial via straw. Trace penetration with each of the thin liquid trials, without definite aspiration. Each instance of laryngeal penetration was observed to be silent. Prolonged mastication with the mechanical soft trial; therefore, the regular solid was not presented. Post swallow residue was felt to be mild to moderate lingually, as well as in the vallecula. RECOMMENDATIONS: Change to mechanical soft diet consistency to allow for improved ability to breakdown the bolus in a more time efficient manner, continue with thin liquids; meds whole with thin liquids; RN to monitor for increased congestion; Ok for liquid consumption via cup or straw (as ST previously recommended to avoid utilization of straw). ST to continue to monitor PRN. Thanks!

## 2019-06-06 NOTE — PROGRESS NOTES
[facility] Medicine Services  INPATIENT PROGRESS NOTE    Length of Stay: 4  Date of Admission: 6/2/2019  Primary Care Physician: Eamon Elizalde APRN    Subjective   Chief Complaint: Follow-up left rib pain  HPI   The patient is sitting up in the chair eating lunch.  She seems very sad and withdrawn today.  She tells me she feels okay today, is just still having quite a bit of pain to the left posterior rib area.  She does get short of breath with activity.  She denies chest pain.  She denies abdominal pain, nausea, vomiting, or diarrhea.  She states she is still just weak and tired.    Review of Systems   All pertinent negatives and positives are as above. All other systems have been reviewed and are negative unless otherwise stated.     Objective    Temp:  [97.2 °F (36.2 °C)-98.4 °F (36.9 °C)] 97.7 °F (36.5 °C)  Heart Rate:  [62-89] 89  Resp:  [16-18] 16  BP: ()/(45-78) 125/78  Physical Exam  Constitutional: She is oriented to person, place, and time. She appears well-developed and well-nourished. No distress.   Obese body habitus   HENT:   Head: Normocephalic and atraumatic.   Neck: Normal range of motion. Neck supple. No JVD present. No tracheal deviation present.   Cardiovascular: Normal rate, regular rhythm, normal heart sounds and intact distal pulses. Exam reveals no gallop and no friction rub.   Sinus 81-92  Pulmonary/Chest: Effort normal. She has no wheezes. She has no rales.   Diminished breath sounds   Abdominal: Soft. Bowel sounds are normal. She exhibits no distension. There is tenderness- left posterior rib area. There is no guarding.   Musculoskeletal: She exhibits edema (non-pitting moderate edema BLE) and tenderness (BLE).   Neurological: She is alert and oriented to person, place, and time. No cranial nerve deficit.   Skin: Skin is warm and dry. No rash noted. No erythema.   Psychiatric: She has a normal mood and affect. Her behavior is  normal. Judgment and thought content normal.   Vitals reviewed    Results Review:  I have reviewed the labs, radiology results, and diagnostic studies.    Laboratory Data:   Results from last 7 days   Lab Units 06/06/19  0640 06/05/19  0553 06/04/19  0535   WBC 10*3/mm3 18.48* 18.46* 17.69*   HEMOGLOBIN g/dL 9.6* 10.0* 9.7*   HEMATOCRIT % 28.7* 29.3* 29.1*   PLATELETS 10*3/mm3 300 323 298      Results from last 7 days   Lab Units 06/06/19  0640 06/05/19  0553 06/04/19  0535 06/03/19  0754   SODIUM mmol/L 135 137 137 136  136   POTASSIUM mmol/L 4.2 4.3 4.1 4.3  4.3   CHLORIDE mmol/L 107 109 109 109  109   CO2 mmol/L 24.0 24.0 24.0 24.0  24.0   BUN mg/dL 25* 27* 25* 26*  26*   CREATININE mg/dL 0.98 0.97 0.98 0.95  0.95   CALCIUM mg/dL 7.9* 8.0* 8.0* 7.9*  7.9*   BILIRUBIN mg/dL  --  0.5 0.5 0.6   ALK PHOS U/L  --  97 93 96   ALT (SGPT) U/L  --  222* 206* 198*   AST (SGOT) U/L  --  323* 320* 283*   GLUCOSE mg/dL 105* 94 90 87  87     Radiology Data:   Imaging Results (last 24 hours)     Procedure Component Value Units Date/Time    XR Shoulder 2+ View Bilateral [[id number]] Collected:  06/05/19 1631     Updated:  06/05/19 1635    Narrative:       EXAMINATION: XR SHOULDER 2+ VW BILATERAL-     6/5/2019 1:56 PM CDT     HISTORY: Shoulder pain; R26.2-Difficulty in walking, not elsewhere  classified; D72.829-Elevated white blood cell count, unspecified;  R13.19-Other dysphagia; Z74.09-Other reduced mobility; R68.89-Other  general symptoms and signs     Bilateral shoulders, 4 views.     OsteoArthritic spurring at each AC joint and each humeral head.     No fracture or dislocation.     Visualized upper ribs are intact.     No AC joint separation.     Summary:  1. Fairly symmetric osteoarthritic spurring at both AC joints and both  shoulder joints.  This report was finalized on 06/05/2019 16:32 by Dr. Julián Garcia MD.        I have reviewed the patient current medications.     Assessment/Plan     Active Hospital Problems     Diagnosis   • **Pneumonia due to infectious organism   • Elevated transaminase level   • Elevated troponin   • Current use of long term anticoagulation   • Leukocytosis   • Generalized weakness     Plan:  1.  Patient continues to complain of pain in the left posterior rib area.  I have spoken with the radiologist who has reviewed her chest CT from 6/2 and chest x-ray from 6/3 and does not see any acute abnormalities consistent with the patient's complaints.  Continue Lidoderm patches, and will add Ultram as needed for pain.  Aqua K pad.  2.  Continue antibiotic therapy with Augmentin, day 2.  Speech therapy to perform VFSS today.  3.  Continue oral steroid therapy  4.  Patient must use incentive spirometer, discussed with nursing as she is going to require much encouragement.  Duo nebs as needed for shortness of breath.  5.  Could consider repeating her echocardiogram as she is still quite fatigued and has made very little improvement in terms of her shortness of breath.  6.  Continue physical and occupational therapy, activity as tolerated  7.  Lateral shoulder x-rays yesterday negative for any acute abnormalities  8.  Labs in a.m.    Discharge Planning: I expect the patient to be discharged to home with home health in 1-2 days.    BJORN Chong   06/06/19   1:40 PM     Chart reviewed.   Patient examined  Agree with assessment and plan   Discussed with patient and JUNIOR Weber DO  06/06/19  3:51 PM

## 2019-06-06 NOTE — THERAPY TREATMENT NOTE
Acute Care - Physical Therapy Treatment Note  [facility]     Patient Name: Karen Snell  : 1945  MRN: [id number]  Today's Date: 2019  Onset of Illness/Injury or Date of Surgery: 19  Date of Referral to PT: 19  Referring Physician: Dr. Gardner    Admit Date: 2019    Visit Dx:    ICD-10-CM ICD-9-CM   1. Inability to walk R26.2 719.7   2. Leukocytosis, unspecified type D72.829 288.60   3. Other dysphagia R13.19 787.29   4. Impaired functional mobility, balance, gait, and endurance Z74.09 V49.89   5. Decreased activities of daily living (ADL) R68.89 780.99     Patient Active Problem List   Diagnosis   • Colon polyps   • Epigastric pain   • History of DVT in adulthood   • Altered mental status   • Hernia, umbilical   • Abnormal finding on imaging   • Inability to walk   • Elevated troponin   • Current use of long term anticoagulation   • Leukocytosis   • Pneumonia due to infectious organism   • Generalized weakness   • Elevated transaminase level       Therapy Treatment    Rehabilitation Treatment Summary     Row Name 19 1501 19 1303 19 1156       Treatment Time/Intention    Discipline  physical therapy assistant  -LG  occupational therapy assistant  -  physical therapy assistant  -    Document Type  therapy note (daily note)  -  therapy note (daily note)  -  therapy note (daily note)  -    Subjective Information  complains of;weakness;fatigue  -  complains of;weakness;fatigue;pain  -  complains of;weakness;fatigue  -    Mode of Treatment  individual therapy;physical therapy  -LG  occupational therapy  -  physical therapy  -    Patient/Family Observations  no family present  -LG  --  --    Patient Effort  --  adequate  -  --    Comment  Pt just getting ready to go for swallow study assited pt from bed to transfer chair.   -LG  --  --    Existing Precautions/Restrictions  --  fall  -  fall  -MF    Recorded by [LG] Goran Williamson PTA 19 1526 [CJ]  Jose A Molina, PINTO/L 06/06/19 1510 [MF] Molly Ybarra., PTA 06/06/19 1350    Row Name 06/06/19 0950             Treatment Time/Intention    Discipline  speech language pathologist  -MB      Document Type  therapy note (daily note)  -MB      Subjective Information  no complaints  -MB      Mode of Treatment  speech-language pathology  -MB      Patient/Family Observations  Female family member present  -MB      Patient Effort  good  -MB      Recorded by [MB] Aroldo Patel CCC-SLP 06/06/19 1005      Row Name 06/06/19 1501 06/06/19 1303 06/06/19 1156       Bed Mobility Assessment/Treatment    Comment (Bed Mobility)  sitting EOB  -LG  up in chair!  -  sitting EOB upon entering room   -    Recorded by [LG] Goran Williamson, PTA 06/06/19 1526 [CJ] Jose A Molina, PINTO/L 06/06/19 1510 [MF] Molly Ybarra., PTA 06/06/19 1350    Row Name 06/06/19 1501 06/06/19 1156          Transfer Assessment/Treatment    Comment (Transfers)  Had pt back up to swallow study chair and step up backwards onto foot rest, ,then stepped up up on foot rest with ther foot.   -LG  Assisted pt. to BSC and her daughter in law assisted her back to EOB when she was finished. Then PTA walked pt.   -     Recorded by [LG] Goran Williamson, PTA 06/06/19 1526 [MF] Molly Ybarra., PTA 06/06/19 1350     Row Name 06/06/19 1501 06/06/19 1156          Sit-Stand Transfer    Sit-Stand [place] (Transfers)  verbal cues;contact guard  -  contact guard;stand by assist  -     Assistive Device (Sit-Stand Transfers)  walker, front-wheeled  -LG  --     Recorded by [LG] Goran Williamson, PTA 06/06/19 1526 [MF] Molly Ybarra., PTA 06/06/19 1350     Row Name 06/06/19 1156             Stand-Sit Transfer    Stand-Sit [place] (Transfers)  contact guard;stand by assist  -      Recorded by [MF] Molly Ybarra, PTA 06/06/19 1350      Row Name 06/06/19 1156             Toilet Transfer    Type (Toilet Transfer)  stand pivot/stand  step  -MF      [place] Level (Toilet Transfer)  contact guard  -MF      Assistive Device (Toilet Transfer)  commode, bedside without drop arms  -MF      Recorded by [MF] Molly Ybarra, PTA 06/06/19 1350      Row Name 06/06/19 1501 06/06/19 1156          Gait/Stairs Assessment/Training    Gait/Stairs Assessment/Training  distance ambulated  -LG  --     [place] Level (Gait)  contact guard  -LG  contact guard  -MF     Assistive Device (Gait)  walker, front-wheeled  -LG  walker, front-wheeled  -MF     Distance in Feet (Gait)  4  -LG  30  -MF     Pattern (Gait)  step-through  -LG  --     Deviations/Abnormal Patterns (Gait)  --  stride length decreased;corinne decreased  -MF     Bilateral Gait Deviations  forward flexed posture  -LG  forward flexed posture;heel strike decreased  -MF     Comment (Gait/Stairs)  --  Pt. was apparently more tired and weak this morning and was unable to walk further.   -MF     Recorded by [LG] Goran Williamson, PTA 06/06/19 1526 [MF] Molly Ybarra, PTA 06/06/19 1350     Row Name 06/06/19 1303             Motor Skills Assessment/Interventions    Additional Documentation  Therapeutic Exercise (Group)  -CJ      Recorded by [CJ] Jose A Molina COTA/L 06/06/19 1510      Row Name 06/06/19 1303             Therapeutic Exercise    Upper Extremity (Therapeutic Exercise)  bicep curl, bilateral;wand exercises  -      Upper Extremity Range of Motion (Therapeutic Exercise)  elbow flexion/extension, bilateral;wrist flexion/extension, bilateral  -CJ      Weight/Resistance (Therapeutic Exercise)  2 pounds;3 pounds;yellow  -CJ      Exercise Type (Therapeutic Exercise)  AROM (active range of motion)  -CJ      Position (Therapeutic Exercise)  seated  -CJ      Sets/Reps (Therapeutic Exercise)  2 sets x 15 reps!  -CJ      Recorded by [CJ] Jose A Molina COTA/L 06/06/19 1510      Row Name 06/06/19 1501 06/06/19 1303 06/06/19 1156       Positioning and Restraints    Pre-Treatment  Position  in bed  -LG  sitting in chair/recliner  -CJ  in bed  -MF    Post Treatment Position  other  -LG  chair  -CJ  chair  -MF    In Chair  --  sitting;call light within reach;encouraged to call for assist  -CJ  sitting;call light within reach;encouraged to call for assist  -MF    Other Position  with other staff In swallow study chair with transport  -LG  --  --    Recorded by [LG] Goran Williamson, [facility] 06/06/19 1526 [CJ] Jose A Molina PINTO/TAYLOR 06/06/19 1510 [MF] Molly Ybarra, PTA 06/06/19 1350    Row Name 06/06/19 1303             Pain Assessment    Additional Documentation  Pain Scale 2: Word Pre/Post-Treatment (Group)  -CJ      Recorded by [CJ] Jose A Molina PINTO/L 06/06/19 1510      Row Name 06/06/19 1303 06/06/19 1156          Pain Scale: Numbers Pre/Post-Treatment    Pain Scale: Numbers, Pretreatment  3/10  -CJ  0/10 - no pain  -     Pain Scale: Numbers, Post-Treatment  3/10  -CJ  0/10 - no pain  -     Pain Location - Side  Left  -CJ  --     Pain Location - Orientation  upper  -CJ  --     Pain Location  back  -CJ  --     Pain Intervention(s)  Repositioned;Rest  -CJ  --     Recorded by [CJ] Jose A Molina PINTO/L 06/06/19 1510 [MF] Molly Ybarra, PTA 06/06/19 1350     Row Name 06/06/19 0950             Pain Scale: FACES Pre/Post-Treatment    Pain: FACES Scale, Pretreatment  0-->no hurt  -MB      Recorded by [MB] Aroldo Patel CCC-SLP 06/06/19 1005      Row Name 06/06/19 0800             Respiratory WDL    Rhythm/Pattern, Respiratory  shallow Simultaneous filing. User may not have seen previous data.  -MB,BL      Recorded by [MB,BL] Aroldo Patel CCC-SLP (r) Karly Bowers RN (t) 06/06/19 1005      Row Name 06/06/19 1303             Outcome Summary/Treatment Plan (OT)    Daily Summary of Progress (OT)  progress toward functional goals is good  -      Barriers to Overall Progress (OT)  weakness/pain!  -CJ      Plan for Continued Treatment (OT)  continue with ot poc!   -CJ      Recorded by [CJ] Jose A Molina, PINTO/L 06/06/19 1510      Row Name 06/06/19 0950             Outcome Summary/Treatment Plan (SLP)    Daily Summary of Progress (SLP)  progress toward functional goals is good  -MB      Barriers to Overall Progress (SLP)  none  -MB      Plan for Continued Treatment (SLP)  VFSS today to r/o silent aspiration  -MB      Anticipated Dischage Disposition  home with assist  -MB      Recorded by [MB] Aroldo Patel CCC-SLP 06/06/19 1005        User Key  (r) = Recorded By, (t) = Taken By, (c) = Cosigned By    Initials Name Effective Dates Discipline    Aroldo Cloud CCC-SLP 08/02/16 -  SLP    LG Goran Williamson, PTA 08/02/16 -  PT    Jose A Hernandez, PINTO/L 08/02/16 -  OT    Molly Knapp, PTA 08/02/16 -  PT    Karly Blackman RN 07/26/16 -  Nurse               Rehab Goal Summary     Row Name 06/06/19 0950             Oral Nutrition/Hydration Goal 1 (SLP)    Oral Nutrition/Hydration Goal 1, SLP  LTG: Patient will tolerate LRD with no overt s/s of aspiration.  -MB      Time Frame (Oral Nutrition/Hydration Goal 1, SLP)  short term goal (STG);by discharge  -MB      Barriers (Oral Nutrition/Hydration Goal 1, SLP)  none  -MB      Progress/Outcomes (Oral Nutrition/Hydration Goal 1, SLP)  continuing progress toward goal  -MB        User Key  (r) = Recorded By, (t) = Taken By, (c) = Cosigned By    Initials Name Provider Type Discipline    Aroldo Cloud CCC-SLP Speech and Language Pathologist SLP          Physical Therapy Education     Title: PT OT SLP Therapies (In Progress)     Topic: Physical Therapy (Done)     Point: Mobility training (Done)     Learning Progress Summary           Patient Acceptance, E,TB,D, VU,DU,NR by SAILAJA at 6/3/2019 10:47 AM    Comment:  Education re: purpose of PT and importance of activity; education for improved technique w/ bed mobility, tfers and gait w/ use of rwx.  Education to pace activities due to shortness of breath and  fatigue w/ minimal effort.   Family Acceptance, E,TB,D, VU,DU,NR by SAILAJA at 6/3/2019 10:47 AM    Comment:  Education re: purpose of PT and importance of activity; education for improved technique w/ bed mobility, tfers and gait w/ use of rwx.  Education to pace activities due to shortness of breath and fatigue w/ minimal effort.                   Point: Precautions (Done)     Learning Progress Summary           Patient Acceptance, E,TB,D, VU,DU,NR by SAILAJA at 6/3/2019 10:47 AM    Comment:  Education re: purpose of PT and importance of activity; education for improved technique w/ bed mobility, tfers and gait w/ use of rwx.  Education to pace activities due to shortness of breath and fatigue w/ minimal effort.   Family Acceptance, E,TB,D, ZULEYKA,JOHN,NR by SAILAJA at 6/3/2019 10:47 AM    Comment:  Education re: purpose of PT and importance of activity; education for improved technique w/ bed mobility, tfers and gait w/ use of rwx.  Education to pace activities due to shortness of breath and fatigue w/ minimal effort.                               User Key     Initials Effective Dates Name Provider Type Discipline     08/02/18 -  Racheal Dorantes, PT Physical Therapist PT                PT Recommendation and Plan        Outcome Measures     Row Name 06/06/19 1303 06/05/19 1200          How much help from another is currently needed...    Putting on and taking off regular lower body clothing?  3  -  3  -TS     Bathing (including washing, rinsing, and drying)  3  -  3  -TS     Toileting (which includes using toilet bed pan or urinal)  3  -  3  -TS     Putting on and taking off regular upper body clothing  4  -  4  -TS     Taking care of personal grooming (such as brushing teeth)  4  -  4  -TS     Eating meals  4  -  4  -TS     Score  21  -  21  -TS        Functional Assessment    Outcome Measure Options  AM-PAC 6 Clicks Daily Activity (OT)  -CJ  AM-PAC 6 Clicks Daily Activity (OT)  -TS       User Key  (r) = Recorded By,  (t) = Taken By, (c) = Cosigned By    Initials Name Provider Type     Jose A Molina, PINTO/L Occupational Therapy Assistant    TS Zenia Ibarra, PINTO/L Occupational Therapy Assistant         Time Calculation:   PT Charges     Row Name 06/06/19 1501 06/06/19 1352          Time Calculation    Start Time  1501  -LG  1156  -     Stop Time  1527  -LG  1219  -MF     Time Calculation (min)  26 min  -LG  23 min  -MF     PT Non-Billable Time (min)  --  5 min  -MF     PT Received On  06/06/19  -LG  06/06/19  -     PT Goal Re-Cert Due Date  06/13/19  -LG  06/13/19  -        Time Calculation- PT    Total Timed Code Minutes- PT  26 minute(s)  -LG  18 minute(s)  -        Timed Charges    87334 - Gait Training Minutes   --  18  -MF       User Key  (r) = Recorded By, (t) = Taken By, (c) = Cosigned By    Initials Name Provider Type     Goran Williamson, JOSEFINA Physical Therapy Assistant     Molly Ybarra, JOSEFINA Physical Therapy Assistant        Therapy Charges for Today     Code Description Service Date Service Provider Modifiers Qty    [id number] HC PT THERAPEUTIC ACT EA 15 MIN 6/6/2019 Goran Williamson, JOSEFINA GP 2          PT G-Codes  Outcome Measure Options: AM-PAC 6 Clicks Daily Activity (OT)  Score: 21    Goran Williamson PTA  6/6/2019

## 2019-06-07 NOTE — DISCHARGE SUMMARY
[facility] Medicine Services  DISCHARGE SUMMARY       Date of Admission: 6/2/2019  Date of Discharge:  6/7/2019  Primary Care Physician: Eamon Elizalde APRN    Presenting Problem/History of Present Illness:  Generalized weakness     Final Discharge Diagnoses:  Active Hospital Problems    Diagnosis   • **Pneumonia due to infectious organism   • Elevated transaminase level   • Elevated troponin   • Current use of long term anticoagulation   • Leukocytosis   • Generalized weakness     Consults: None    Procedures Performed: None    Pertinent Test Results:   Lab Results (all)     Procedure Component Value Units Date/Time    Manual Differential [[id number]]  (Abnormal) Collected:  06/07/19 0655    Specimen:  Blood Updated:  06/07/19 0727     Neutrophil % 79.0 %      Lymphocyte % 6.0 %      Monocyte % 6.0 %      Eosinophil % 1.0 %      Bands %  5.0 %      Metamyelocyte % 2.0 %      Myelocyte % 1.0 %      Neutrophils Absolute 18.96 10*3/mm3      Lymphocytes Absolute 1.35 10*3/mm3      Monocytes Absolute 1.35 10*3/mm3      Eosinophils Absolute 0.23 10*3/mm3      RBC Morphology Normal     WBC Morphology Normal     Platelet Morphology Normal    CBC Auto Differential [[id number]]  (Abnormal) Collected:  06/07/19 0655    Specimen:  Blood Updated:  06/07/19 0727     WBC 22.57 10*3/mm3      RBC 3.97 10*6/mm3      Hemoglobin 10.2 g/dL      Hematocrit 30.5 %      MCV 76.8 fL      MCH 25.7 pg      MCHC 33.4 g/dL      RDW 14.0 %      RDW-SD 39.0 fl      MPV 9.7 fL      Platelets 354 10*3/mm3     Comprehensive Metabolic Panel [[id number]]  (Abnormal) Collected:  06/07/19 0655    Specimen:  Blood Updated:  06/07/19 0716     Glucose 110 mg/dL      BUN 25 mg/dL      Creatinine 0.83 mg/dL      Sodium 134 mmol/L      Potassium 4.4 mmol/L      Chloride 108 mmol/L      CO2 23.0 mmol/L      Calcium 8.2 mg/dL      Total Protein 5.7 g/dL      Albumin 2.50 g/dL      ALT (SGPT) 243 U/L      AST (SGOT) 276 U/L       Alkaline Phosphatase 93 U/L      Total Bilirubin 0.4 mg/dL      eGFR   Amer 82 mL/min/1.73      Globulin 3.2 gm/dL      A/G Ratio 0.8 g/dL      BUN/Creatinine Ratio 30.1     Anion Gap 3.0 mmol/L     Blood Culture - Blood, Arm, Right [[id number]] Collected:  06/02/19 0230    Specimen:  Blood from Arm, Right Updated:  06/07/19 0315     Blood Culture No growth at 5 days    Blood Culture - Blood, Arm, Left [[id number]] Collected:  06/02/19 0240    Specimen:  Blood from Arm, Left Updated:  06/07/19 0315     Blood Culture No growth at 5 days    CBC Auto Differential [[id number]]  (Abnormal) Collected:  06/06/19 0640    Specimen:  Blood Updated:  06/06/19 0758     WBC 18.48 10*3/mm3      RBC 3.74 10*6/mm3      Hemoglobin 9.6 g/dL      Hematocrit 28.7 %      MCV 76.7 fL      MCH 25.7 pg      MCHC 33.4 g/dL      RDW 14.2 %      RDW-SD 39.5 fl      MPV 9.9 fL      Platelets 300 10*3/mm3     Manual Differential [[id number]]  (Abnormal) Collected:  06/06/19 0640    Specimen:  Blood Updated:  06/06/19 0758     Neutrophil % 80.0 %      Lymphocyte % 11.0 %      Monocyte % 2.0 %      Eosinophil % 2.0 %      Bands %  4.0 %      Myelocyte % 1.0 %      Neutrophils Absolute 15.52 10*3/mm3      Lymphocytes Absolute 2.03 10*3/mm3      Monocytes Absolute 0.37 10*3/mm3      Eosinophils Absolute 0.37 10*3/mm3      Microcytes Slight/1+     Poikilocytes Slight/1+     WBC Morphology Normal     Platelet Morphology Normal    Basic Metabolic Panel [[id number]]  (Abnormal) Collected:  06/06/19 0640    Specimen:  Blood Updated:  06/06/19 0741     Glucose 105 mg/dL      BUN 25 mg/dL      Creatinine 0.98 mg/dL      Sodium 135 mmol/L      Potassium 4.2 mmol/L      Chloride 107 mmol/L      CO2 24.0 mmol/L      Calcium 7.9 mg/dL      eGFR  African Amer 67 mL/min/1.73      BUN/Creatinine Ratio 25.5     Anion Gap 4.0 mmol/L     CBC Auto Differential [[id number]]  (Abnormal) Collected:  06/05/19 0553    Specimen:  Blood Updated:  06/05/19 0643     WBC  18.46 10*3/mm3      RBC 3.81 10*6/mm3      Hemoglobin 10.0 g/dL      Hematocrit 29.3 %      MCV 76.9 fL      MCH 26.2 pg      MCHC 34.1 g/dL      RDW 14.1 %      RDW-SD 39.3 fl      MPV 9.7 fL      Platelets 323 10*3/mm3     Manual Differential [[id number]]  (Abnormal) Collected:  06/05/19 0553    Specimen:  Blood Updated:  06/05/19 0643     Neutrophil % 89.0 %      Lymphocyte % 2.0 %      Monocyte % 2.0 %      Eosinophil % 2.0 %      Bands %  1.0 %      Myelocyte % 2.0 %      Atypical Lymphocyte % 2.0 %      Neutrophils Absolute 16.61 10*3/mm3      Lymphocytes Absolute 0.37 10*3/mm3      Monocytes Absolute 0.37 10*3/mm3      Eosinophils Absolute 0.37 10*3/mm3      Anisocytosis Slight/1+     Microcytes Slight/1+     Target Cells Slight/1+     WBC Morphology Normal     Platelet Morphology Normal    Comprehensive Metabolic Panel [[id number]]  (Abnormal) Collected:  06/05/19 0553    Specimen:  Blood Updated:  06/05/19 0630     Glucose 94 mg/dL      BUN 27 mg/dL      Creatinine 0.97 mg/dL      Sodium 137 mmol/L      Potassium 4.3 mmol/L      Chloride 109 mmol/L      CO2 24.0 mmol/L      Calcium 8.0 mg/dL      Total Protein 4.8 g/dL      Albumin 2.10 g/dL      ALT (SGPT) 222 U/L      AST (SGOT) 323 U/L      Alkaline Phosphatase 97 U/L      Total Bilirubin 0.5 mg/dL      eGFR  African Amer 68 mL/min/1.73      Globulin 2.7 gm/dL      A/G Ratio 0.8 g/dL      BUN/Creatinine Ratio 27.8     Anion Gap 4.0 mmol/L     CBC Auto Differential [[id number]]  (Abnormal) Collected:  06/04/19 0535    Specimen:  Blood Updated:  06/04/19 0624     WBC 17.69 10*3/mm3      RBC 3.69 10*6/mm3      Hemoglobin 9.7 g/dL      Hematocrit 29.1 %      MCV 78.9 fL      MCH 26.3 pg      MCHC 33.3 g/dL      RDW 13.9 %      RDW-SD 39.8 fl      MPV 10.1 fL      Platelets 298 10*3/mm3      nRBC 0.0 /100 WBC     Manual Differential [[id number]]  (Abnormal) Collected:  06/04/19 0535    Specimen:  Blood Updated:  06/04/19 0624     Neutrophil % 78.0 %       Lymphocyte % 12.0 %      Monocyte % 3.0 %      Basophil % 1.0 %      Bands %  2.0 %      Metamyelocyte % 2.0 %      Myelocyte % 1.0 %      Atypical Lymphocyte % 1.0 %      Neutrophils Absolute 14.15 10*3/mm3      Lymphocytes Absolute 2.12 10*3/mm3      Monocytes Absolute 0.53 10*3/mm3      Basophils Absolute 0.18 10*3/mm3      RBC Morphology Normal     WBC Morphology Normal     Platelet Morphology Normal    Comprehensive Metabolic Panel [[id number]]  (Abnormal) Collected:  06/04/19 0535    Specimen:  Blood Updated:  06/04/19 0619     Glucose 90 mg/dL      BUN 25 mg/dL      Creatinine 0.98 mg/dL      Sodium 137 mmol/L      Potassium 4.1 mmol/L      Chloride 109 mmol/L      CO2 24.0 mmol/L      Calcium 8.0 mg/dL      Total Protein 4.9 g/dL      Albumin 2.10 g/dL      ALT (SGPT) 206 U/L      AST (SGOT) 320 U/L      Alkaline Phosphatase 93 U/L      Total Bilirubin 0.5 mg/dL      eGFR  African Amer 67 mL/min/1.73      Globulin 2.8 gm/dL      A/G Ratio 0.8 g/dL      BUN/Creatinine Ratio 25.5     Anion Gap 4.0 mmol/L     S. Pneumo Ag Urine or CSF - Urine, Urine, Catheter [[id number]]  (Normal) Collected:  06/02/19 0356    Specimen:  Urine, Catheter Updated:  06/03/19 0954     Strep Pneumo Ag Negative    Legionella Antigen, Urine - Urine, Urine, Catheter [[id number]]  (Normal) Collected:  06/02/19 0356    Specimen:  Urine, Catheter Updated:  06/03/19 0953     LEGIONELLA ANTIGEN, URINE Negative    Comprehensive Metabolic Panel [[id number]]  (Abnormal) Collected:  06/03/19 0754    Specimen:  Blood Updated:  06/03/19 0926     Glucose 87 mg/dL      BUN 26 mg/dL      Creatinine 0.95 mg/dL      Sodium 136 mmol/L      Potassium 4.3 mmol/L      Chloride 109 mmol/L      CO2 24.0 mmol/L      Calcium 7.9 mg/dL      Total Protein 5.1 g/dL      Albumin 2.40 g/dL      ALT (SGPT) 198 U/L      AST (SGOT) 283 U/L      Alkaline Phosphatase 96 U/L      Total Bilirubin 0.6 mg/dL      eGFR  African Amer 70 mL/min/1.73      Globulin 2.7 gm/dL       A/G Ratio 0.9 g/dL      BUN/Creatinine Ratio 27.4     Anion Gap 3.0 mmol/L     CBC Auto Differential [[id number]]  (Abnormal) Collected:  06/03/19 0754    Specimen:  Blood Updated:  06/03/19 0917     WBC 17.02 10*3/mm3      RBC 3.89 10*6/mm3      Hemoglobin 10.2 g/dL      Hematocrit 31.0 %      MCV 79.7 fL      MCH 26.2 pg      MCHC 32.9 g/dL      RDW 13.9 %      RDW-SD 40.1 fl      MPV 10.2 fL      Platelets 308 10*3/mm3     Manual Differential [[id number]]  (Abnormal) Collected:  06/03/19 0754    Specimen:  Blood Updated:  06/03/19 0917     Neutrophil % 81.0 %      Lymphocyte % 5.0 %      Monocyte % 4.0 %      Bands %  2.0 %      Metamyelocyte % 4.0 %      Myelocyte % 1.0 %      Promyelocyte % 1.0 %      Atypical Lymphocyte % 2.0 %      Neutrophils Absolute 14.13 10*3/mm3      Lymphocytes Absolute 0.85 10*3/mm3      Monocytes Absolute 0.68 10*3/mm3      Hypochromia Slight/1+     WBC Morphology Normal     Platelet Morphology Normal    Basic Metabolic Panel [[id number]]  (Abnormal) Collected:  06/03/19 0754    Specimen:  Blood Updated:  06/03/19 0849     Glucose 87 mg/dL      BUN 26 mg/dL      Creatinine 0.95 mg/dL      Sodium 136 mmol/L      Potassium 4.3 mmol/L      Chloride 109 mmol/L      CO2 24.0 mmol/L      Calcium 7.9 mg/dL      eGFR  African Amer 70 mL/min/1.73      BUN/Creatinine Ratio 27.4     Anion Gap 3.0 mmol/L     Troponin [[id number]]  (Abnormal) Collected:  06/02/19 1848    Specimen:  Blood Updated:  06/02/19 1918     Troponin I 0.101 ng/mL     Troponin [[id number]]  (Abnormal) Collected:  06/02/19 1129    Specimen:  Blood Updated:  06/02/19 1157     Troponin I 0.092 ng/mL     Troponin [[id number]]  (Abnormal) Collected:  06/02/19 0705    Specimen:  Blood Updated:  06/02/19 0742     Troponin I 0.109 ng/mL     Urinalysis With Culture If Indicated - Urine, Catheter [[id number]]  (Abnormal) Collected:  06/02/19 0356    Specimen:  Urine, Catheter Updated:  06/02/19 0431     Color, UA Yellow     Appearance, UA  Cloudy     pH, UA <=5.0     Specific Gravity, UA 1.015     Glucose, UA Negative     Ketones, UA Negative     Bilirubin, UA Negative     Blood, UA Large (3+)     Protein, UA Trace     Leuk Esterase, UA Negative     Nitrite, UA Negative     Urobilinogen, UA 0.2 E.U./dL    Urinalysis, Microscopic Only - Urine, Catheter [[id number]]  (Abnormal) Collected:  06/02/19 0356    Specimen:  Urine, Catheter Updated:  06/02/19 0431     RBC, UA 0-2 /HPF      WBC, UA None Seen /HPF      Bacteria, UA None Seen /HPF      Squamous Epithelial Cells, UA 3-6 /HPF      Hyaline Casts, UA None Seen /LPF      Granular Casts, UA 0-2 /LPF      Amorphous Crystals, UA Small/1+ /HPF      Methodology Automated Microscopy    TSH [[id number]]  (Normal) Collected:  06/02/19 0230    Specimen:  Blood from Arm, Right Updated:  06/02/19 0359     TSH 3.820 mIU/mL     BNP [[id number]]  (Normal) Collected:  06/02/19 0252    Specimen:  Blood Updated:  06/02/19 0331     proBNP 665.0 pg/mL     Troponin [[id number]]  (Abnormal) Collected:  06/02/19 0252    Specimen:  Blood Updated:  06/02/19 0331     Troponin I 0.112 ng/mL     Comprehensive Metabolic Panel [[id number]]  (Abnormal) Collected:  06/02/19 0252    Specimen:  Blood Updated:  06/02/19 0319     Glucose 117 mg/dL      BUN 33 mg/dL      Creatinine 1.07 mg/dL      Sodium 135 mmol/L      Potassium 4.2 mmol/L      Chloride 107 mmol/L      CO2 23.0 mmol/L      Calcium 8.2 mg/dL      Total Protein 5.9 g/dL      Albumin 2.90 g/dL      ALT (SGPT) 229 U/L      AST (SGOT) 350 U/L      Alkaline Phosphatase 104 U/L      Total Bilirubin 0.6 mg/dL      eGFR   Amer 61 mL/min/1.73      Globulin 3.0 gm/dL      A/G Ratio 1.0 g/dL      BUN/Creatinine Ratio 30.8     Anion Gap 5.0 mmol/L     Magnesium [[id number]]  (Normal) Collected:  06/02/19 0252    Specimen:  Blood Updated:  06/02/19 0319     Magnesium 1.7 mg/dL     D-dimer, Quantitative [[id number]]  (Abnormal) Collected:  06/02/19 0252    Specimen:  Blood Updated:   06/02/19 0318     D-Dimer, Quantitative 1.17 mg/L (FEU)     CBC Auto Differential [[id number]]  (Abnormal) Collected:  06/02/19 0252    Specimen:  Blood Updated:  06/02/19 0308     WBC 18.72 10*3/mm3      RBC 3.95 10*6/mm3      Hemoglobin 10.2 g/dL      Hematocrit 30.3 %      MCV 76.7 fL      MCH 25.8 pg      MCHC 33.7 g/dL      RDW 13.7 %      RDW-SD 38.4 fl      MPV 10.0 fL      Platelets 321 10*3/mm3      Neutrophil % 81.5 %      Lymphocyte % 7.6 %      Monocyte % 3.5 %      Eosinophil % 3.0 %      Basophil % 0.3 %      Immature Grans % 4.1 %      Neutrophils, Absolute 15.26 10*3/mm3      Lymphocytes, Absolute 1.42 10*3/mm3      Monocytes, Absolute 0.66 10*3/mm3      Eosinophils, Absolute 0.56 10*3/mm3      Basophils, Absolute 0.06 10*3/mm3      Immature Grans, Absolute 0.76 10*3/mm3      nRBC 0.0 /100 WBC     POC Glucose Once [[id number]]  (Normal) Collected:  06/02/19 0211    Specimen:  Blood Updated:  06/02/19 0223     Glucose 113 mg/dL      Comment: : [id number] Rizzo JamiMeter ID: [id number]           Imaging Results (all)     Procedure Component Value Units Date/Time    FL Video Swallow With Speech [[id number]] Collected:  06/06/19 1654     Updated:  06/07/19 0749    Narrative:          HISTORY  74 years old with dysphagia; R26.2-Difficulty in walking, not elsewhere  classified; D72.829-Elevated white blood cell count, unspecified;  R13.19-Other dysphagia; Z74.09-Other reduced mobility; R68.89-Other  general symptoms and signs.     REFERENCE STUDY.  None.     FINDINGS.  The patient swallowed multiple consistencies of barium showing  penetration with nectar thick and thin consistencies. No definite  aspiration.     Please see speech pathology report for further details.     Total images 1. Fluoroscopic time 1.5 minutes.  This report was finalized on 06/07/2019 07:46 by Dr Rip Gutierrez, .    XR Shoulder 2+ View Bilateral [[id number]] Collected:  06/05/19 1631     Updated:  06/05/19 1635    Narrative:        EXAMINATION: XR SHOULDER 2+ VW BILATERAL-     6/5/2019 1:56 PM CDT     HISTORY: Shoulder pain; R26.2-Difficulty in walking, not elsewhere  classified; D72.829-Elevated white blood cell count, unspecified;  R13.19-Other dysphagia; Z74.09-Other reduced mobility; R68.89-Other  general symptoms and signs     Bilateral shoulders, 4 views.     OsteoArthritic spurring at each AC joint and each humeral head.     No fracture or dislocation.     Visualized upper ribs are intact.     No AC joint separation.     Summary:  1. Fairly symmetric osteoarthritic spurring at both AC joints and both  shoulder joints.  This report was finalized on 06/05/2019 16:32 by Dr. Julián Garcia MD.    US Venous Doppler Lower Extremity Bilateral (duplex) [[id number]] Collected:  06/03/19 1809     Updated:  06/03/19 1813    Narrative:       History: Swelling       Impression:       Impression: There is no evidence of deep venous thrombosis or  superficial thrombophlebitis of right or left lower extremities.     Comments: Bilateral lower extremity venous duplex exam was performed  using color Doppler flow, Doppler waveform analysis, and grayscale  imaging, with and without compression. There is no evidence of deep  venous thrombosis in the common femoral, superficial femoral, and  popliteal veins bilaterally. The tibial veins were not visualized  bilaterally due to body habitus. No thrombus is identified in the  saphenofemoral junctions and greater saphenous veins bilaterally.         This report was finalized on 06/03/2019 18:09 by Dr. Eriberto Leonard MD.    XR Chest PA & Lateral [[id number]] Collected:  06/03/19 1108     Updated:  06/03/19 1113    Narrative:       EXAMINATION:  XR CHEST PA AND LATERAL-  6/3/2019 10:54 AM CDT     HISTORY: Shortness of air. R26.2-Difficulty in walking, not elsewhere  classified; D72.829-Elevated white blood cell count, unspecified;  R13.19-Other dysphagia; Z74.09-Other reduced mobility; R68.89-Other  general  symptoms and signs.     COMPARISON: 06/02/2019.     TECHNIQUE: 2 views were obtained.     FINDINGS:  There is hazy density in the right lung base. There is mild  blunting of the right costophrenic angle. The left lung is essentially  clear. There is cardiomegaly. There are degenerative changes of the  spine and shoulders.       Impression:       1. Minimal hazy infiltrate at the right lung base. Mild blunting of the  right costophrenic angle may represent minimal fluid.  2. Cardiomegaly.         This report was finalized on 06/03/2019 11:10 by Dr. Terry Rivera MD.    XR Toe 2+ View Left [[id number]] Collected:  06/02/19 1113     Updated:  06/02/19 1117    Narrative:       EXAMINATION: XR TOE 2+ VW LEFT-     6/2/2019 10:40 AM CDT     HISTORY: wound; R26.2-Difficulty in walking, not elsewhere classified;  D72.829-Elevated white blood cell count, unspecified     Right toes, 2 views.     A discrete soft tissue defect is not seen.  There is no foreign body.     The bones are osteopenic.     There is focal erosion at the first metatarsal head which may be related  to gout or other erosive arthritis.     There is also osteoarthritic related joint disease.     No acute fracture is seen.     Summary:  1. First metatarsal head bony erosion.  2. No foreign body or fracture.  This report was finalized on 06/02/2019 11:14 by Dr. Julián Garcia MD.    XR Chest 1 View [[id number]] Collected:  06/02/19 0840     Updated:  06/02/19 0843    Narrative:       EXAMINATION: XR CHEST 1 VW-     6/2/2019 2:41 AM CDT     HISTORY: Short of breath.     1 view chest x-ray compared with 02/01/2019.     Findings heart size.  Normal mediastinum.     Full expanded lungs with chronic interstitial disease and a few  granulomas.     No focal infiltrate, pneumothorax, or heart failure.     Summary:  1. Chronic lung changes.     This report was finalized on 06/02/2019 08:40 by Dr. Julián Garcia MD.    CT Chest Without Contrast [[id number]] Collected:   06/02/19 0814     Updated:  06/02/19 0821    Narrative:       EXAMINATION: CT CHEST WO CONTRAST-      6/2/2019 6:08 AM CDT     HISTORY: COPD exacerbation, complicated; R26.2-Difficulty in walking,  not elsewhere classified; D72.829-Elevated white blood cell count,  unspecified     In order to have a CT radiation dose as low as reasonably achievable  Automated Exposure Control was utilized for adjustment of the mA and/or  KV according to patient size.     DLP in mGycm= 265.     Noncontrast chest CT compared with 08/08/2016.     A preliminary [facility] report was faxed to the Emergency Department  immediately after this study was interpreted at 7:28 AM.     Cardiomegaly. Trace amount pericardial fluid.  No thoracic aortic aneurysm.  No mediastinal mass.     Patchy bibasilar infiltrate compatible with developing pneumonia. No  dense consolidation or pleural fluid.  No pneumothorax or heart failure.     Extensive degenerative thoracic spurring.  Stable benign-appearing 2 cm low-density lesion within the left hepatic  lobe.  Incidental mid esophageal fluid noted.     Summary:  1. Bibasilar infiltrate compatible with developing pneumonia.                                   This report was finalized on 06/02/2019 08:18 by Dr. Julián Garcia MD.    CT Head Without Contrast [[id number]] Collected:  06/02/19 0813     Updated:  06/02/19 0817    Narrative:       EXAMINATION: CT HEAD WO CONTRAST-      6/2/2019 6:08 AM CDT     HISTORY: Stroke; R26.2-Difficulty in walking, not elsewhere classified;  D72.829-Elevated white blood cell count, unspecified     In order to have a CT radiation dose as low as reasonably achievable  Automated Exposure Control was utilized for adjustment of the mA and/or  KV according to patient size.     DLP in mGycm= 540.     Noncontrast head CT compared with 05/31/2027.     A preliminary [facility] report was faxed to the Emergency Department  immediately after this study was interpreted at 7:19 AM.     Axial,  sagittal, and coronal noncontrast CT imaging of the head.     The visualized paranasal sinuses are clear.     The brain and ventricles have an age appropriate appearance.   There is no hemorrhage or mass-effect.   No acute infarction is seen.     No calvarial abnormality.       Impression:       1. No acute intracranial abnormality is seen.                                         This report was finalized on 06/02/2019 08:14 by Dr. Julián Garcia MD.        Chief Complaint on Day of Discharge: Weakness    History of Present Illness on Day of Discharge: Patient is sitting up in the chair with son at bedside.  She reports she feels about the same today, may be a little better.  She would like to go home today.    Hospital Course:  Ms. Snell is a 74-year-old  female who follows [facility] for primary care.  She has a past medical history significant for pulmonary embolus, gastroesophageal reflux disease, and hypertension.  She presented to the [facility] emergency department on 6/2/2019 with complaints of generalized weakness.  Her family also noted that she had been more short of breath than usual.  In the emergency department, troponin was very slightly elevated at 0.11.  BUN was 33 and creatinine 1.07.  D-dimer was elevated at 1.17.  White blood cell count was 18,000.  Analysis was not indicative of infection.  CT of the chest showed a bibasilar infiltrate compatible with developing pneumonia.  The patient was admitted to the hospitalist service for further evaluation and management.    The patient was originally placed on Rocephin and azithromycin for pneumonia.  She did receive 3 days of azithromycin and cephalosporin therapy.  Strep and Legionella antigens were negative.  Blood cultures have remained with no growth to date.  As she had been having complaints of difficulty swallowing and coughing with liquids, it was felt best to change her to Augmentin therapy for anaerobic  "coverage in the event she had aspirated.  She will need to complete a total of 7 days of antibiotic therapy.  She was seen in consultation by speech therapy.  No overt signs or symptoms of aspiration were noted upon their evaluations.  She did go for video fluoroscopy swallowing study.  No definite aspiration noted but she did have penetration with thin and nectar thick consistencies.    The patient's troponin value demonstrated a flat trend.  No further cardiac work-up has been planned at this time as she just recently had a stress echocardiogram in February which was interpreted as low risk for ischemia.    The patient had complaints of pleuritic type chest pain to the left posterior ribs.  There is no bony abnormalities noted on CT scans.  She is been given a tapering dosage of prednisone to assist with this, as well as Ultram and she has been advised to obtain over-the-counter lidocaine patches.    The patient has worked with physical and occupational therapy who have recommended home health services.  She is appropriate for discharge home today.  She will be ordered a hospital bed and bedside commode due to her severe lower extremity edema and impaired mobility.  She will need to follow-up with her primary care provider in 1 week.  As her white blood cell count has remained elevated (may be partially related to steroid therapy), as well as transaminases, we will have home health check a CMP and CBC without differential on Monday with results to her primary care provider.    Condition on Discharge:  Stable    Physical Exam on Discharge:  /53 (BP Location: Left arm, Patient Position: Sitting)   Pulse 77   Temp 97.8 °F (36.6 °C) (Oral)   Resp 16   Ht 170.2 cm (67\")   Wt 112 kg (246 lb 7 oz)   SpO2 99%   BMI 38.60 kg/m²   Physical Exam  Constitutional: She is oriented to person, place, and time. She appears well-developed and well-nourished. No distress.   Obese body habitus   HENT: "   Head: Normocephalic and atraumatic.   Neck: Normal range of motion. Neck supple. No JVD present. No tracheal deviation present.   Cardiovascular: Normal rate, regular rhythm, normal heart sounds and intact distal pulses. Exam reveals no gallop and no friction rub.   Sinus 82-92  Pulmonary/Chest: Effort normal. She has no wheezes. She has no rales.   Diminished breath sounds   Abdominal: Soft. Bowel sounds are normal. She exhibits no distension. There is tenderness- left posterior rib area. There is no guarding.   Musculoskeletal: She exhibits edema (non-pitting moderate edema BLE) and tenderness (BLE).   Neurological: She is alert and oriented to person, place, and time. No cranial nerve deficit.   Skin: Skin is warm and dry. No rash noted. No erythema.   Psychiatric: She has a normal mood and affect. Her behavior is normal. Judgment and thought content normal.   Vitals reviewed    Discharge Disposition:  Home-Health Care [facility] [place]    Discharge Medications:     Discharge Medications      New Medications      Instructions Start Date   amoxicillin-clavulanate 875-125 MG per tablet  Commonly known as:  AUGMENTIN   1 tablet, Oral, Every 12 Hours Scheduled      predniSONE 10 MG tablet  Commonly known as:  DELTASONE   Take 2 tablets by mouth Daily for 3 days, THEN 1 tablet Daily for 3 days.   Start Date:  6/7/2019     traMADol 50 MG tablet  Commonly known as:  ULTRAM   50 mg, Oral, Every 6 Hours PRN         Continue These Medications      Instructions Start Date   buPROPion  MG 24 hr tablet  Commonly known as:  WELLBUTRIN XL   300 mg, Oral, Daily      citalopram 20 MG tablet  Commonly known as:  CeleXA   10 mg, Oral, Daily      donepezil 10 MG tablet  Commonly known as:  ARICEPT   10 mg, Oral, Nightly      pantoprazole 40 MG EC tablet  Commonly known as:  PROTONIX   40 mg, Oral, Daily      rivaroxaban 20 MG tablet  Commonly known as:  XARELTO   20 mg, Oral, Daily           Discharge Diet:   Diet Instructions     Diet:  Soft Texture; Thin Liquids, No Restrictions; Chopped      Discharge Diet:  Soft Texture    Fluid Consistency:  Thin Liquids, No Restrictions    Soft Options:  Chopped        Activity at Discharge:   Activity Instructions     Up WIth Assist          Discharge Care Plan/Instructions:   1.  Return for any acute or worsening symptoms  2.  Home health services for skilled nursing, physical/occupational/speech therapies  3.  Durable medical equipment with hospital bed and bedside commode  4.  Home health to draw a CBC without differential and CMP on Monday with results to PCP    Follow-up Appointments:   1.  Primary care provider in 1 week    Test Results Pending at Discharge: None    BJORN Chong  06/07/19  4:14 PM    Time: 45 minutes    Chart reviewed  Patient examined  Agree with assessment and plan  Discussed with family, patient and JUNIOR Weber DO  06/07/19  6:33 PM

## 2019-06-07 NOTE — PLAN OF CARE
Problem: Patient Care Overview  Goal: Plan of Care Review  Outcome: Ongoing (interventions implemented as appropriate)   06/07/19 8734   Coping/Psychosocial   Plan of Care Reviewed With patient;family   OTHER   Outcome Summary Pt. progressing good with her adl tasks of bathing/dressing! No c/o's or issues with tx!

## 2019-06-07 NOTE — THERAPY TREATMENT NOTE
Acute Care - Physical Therapy Treatment Note  [facility]     Patient Name: Karen Snell  : 1945  MRN: [id number]  Today's Date: 2019  Onset of Illness/Injury or Date of Surgery: 19  Date of Referral to PT: 19  Referring Physician: Dr. Gardner    Admit Date: 2019    Visit Dx:    ICD-10-CM ICD-9-CM   1. Inability to walk R26.2 719.7   2. Leukocytosis, unspecified type D72.829 288.60   3. Other dysphagia R13.19 787.29   4. Impaired functional mobility, balance, gait, and endurance Z74.09 V49.89   5. Decreased activities of daily living (ADL) R68.89 780.99     Patient Active Problem List   Diagnosis   • Colon polyps   • Epigastric pain   • History of DVT in adulthood   • Altered mental status   • Hernia, umbilical   • Abnormal finding on imaging   • Inability to walk   • Elevated troponin   • Current use of long term anticoagulation   • Leukocytosis   • Pneumonia due to infectious organism   • Generalized weakness   • Elevated transaminase level       Therapy Treatment    Rehabilitation Treatment Summary     Row Name 19 1302 19 1227          Treatment Time/Intention    Discipline  physical therapy assistant  -LG  speech language pathologist  -MM     Document Type  therapy note (daily note)  -LG  therapy note (daily note)  -MM     Subjective Information  complains of;weakness;fatigue  -LG2  no complaints  -MM     Mode of Treatment  individual therapy;physical therapy  -LG2  individual therapy;speech-language pathology  -MM     Patient/Family Observations  visitor present  -LG2  Family member present.   -MM     Care Plan Review  --  evaluation/treatment results reviewed;care plan/treatment goals reviewed;risks/benefits reviewed;patient/other agree to care plan  -MM     Care Plan Review, Other Participant(s)  --  family  -MM     Therapy Frequency (Swallow)  --  PRN  -MM     Patient Effort  adequate  -LG2  good  -MM     Existing Precautions/Restrictions  fall  -LG2  --     Recorded by  [LG] Goran Williamson, PTA 06/07/19 1303  [LG2] Goran Williamson, [facility] [place] 06/07/19 1324 [MM] Indira Bullard MS CCC-SLP 06/07/19 1235     Row Name 06/07/19 1302             Bed Mobility Assessment/Treatment    Comment (Bed Mobility)  sitting up in chair  -LG      Recorded by [LG] Goran Williamson, PTA 06/07/19 1324      Row Name 06/07/19 1302             Sit-Stand Transfer    Sit-Stand Freer (Transfers)  verbal cues;contact guard;minimum assist (75% patient effort)  -LG      Assistive Device (Sit-Stand Transfers)  walker, front-wheeled  -LG      Recorded by [LG] Goran Williamson, [facility] [place] 06/07/19 1324      Row Name 06/07/19 1302             Stand-Sit Transfer    Stand-Sit Freer (Transfers)  verbal cues;contact guard  -LG      Assistive Device (Stand-Sit Transfers)  walker, front-wheeled  -LG      Recorded by [LG] Goran Williamson, [facility] [place] 06/07/19 1324      Row Name 06/07/19 1302             Gait/Stairs Assessment/Training    Gait/Stairs Assessment/Training  gait/ambulation independence;gait/ambulation assistive device;distance ambulated  -LG      Freer Level (Gait)  verbal cues;contact guard  -LG      Assistive Device (Gait)  walker, front-wheeled  -LG      Distance in Feet (Gait)  25', standing rest, 25' standing rest, 25' standing rest 25' back to room  -LG      Pattern (Gait)  step-through  -LG      Deviations/Abnormal Patterns (Gait)  bilateral deviations;base of support, wide  -LG      Bilateral Gait Deviations  forward flexed posture  -LG      Comment (Gait/Stairs)  Pt fatigues very quickly  -LG      Recorded by [LG] Goran Williamson, PTA 06/07/19 1327      Row Name 06/07/19 1302             Therapeutic Exercise    Comment (Therapeutic Exercise)  B LE AROM x 2 sets of 10 reps  -LG      Recorded by [LG] Goran Williamson, PTA 06/07/19 1327      Row Name 06/07/19 1227             Pain Assessment    Additional Documentation  Pain Scale: Numbers Pre/Post-Treatment (Group)  -MM      Recorded by [MM] Indira Bullard,  MS CCC-SLP 06/07/19 1235      Row Name 06/07/19 1227             Pain Scale: Numbers Pre/Post-Treatment    Pain Scale: Numbers, Pretreatment  0/10 - no pain  -MM      Pain Scale: Numbers, Post-Treatment  0/10 - no pain  -MM      Recorded by [MM] Indira Bullard MS CCC-SLP 06/07/19 1235      Row Name 06/07/19 1227             Outcome Summary/Treatment Plan (SLP)    Daily Summary of Progress (SLP)  progress toward functional goals as expected  -MM      Barriers to Overall Progress (SLP)  none  -MM      Plan for Continued Treatment (SLP)  Continue mech soft and thin liquids.   -MM      Anticipated Dischage Disposition  home with assist  -MM      Recorded by [MM] Indira Bullard MS CCC-SLP 06/07/19 1235        User Key  (r) = Recorded By, (t) = Taken By, (c) = Cosigned By    Initials Name Effective Dates Discipline    LG Goran Williamson R, PTA 08/02/16 -  PT    MM Indira Bullard, MS CCC-SLP 05/24/19 -  SLP               Rehab Goal Summary     Row Name 06/07/19 1227             Swallow Goals (SLP)    Oral Nutrition/Hydration Goal Selection (SLP)  oral nutrition/hydration, SLP goal 1  -MM         Oral Nutrition/Hydration Goal 1 (SLP)    Oral Nutrition/Hydration Goal 1, SLP  LTG: Patient will tolerate LRD with no overt s/s of aspiration.  -MM      Time Frame (Oral Nutrition/Hydration Goal 1, SLP)  short term goal (STG);by discharge  -MM      Barriers (Oral Nutrition/Hydration Goal 1, SLP)  none  -MM      Progress/Outcomes (Oral Nutrition/Hydration Goal 1, SLP)  continuing progress toward goal  -MM        User Key  (r) = Recorded By, (t) = Taken By, (c) = Cosigned By    Initials Name Provider Type Discipline    Indira Fried, MS CCC-SLP Speech and Language Pathologist SLP          Physical Therapy Education     Title: PT OT SLP Therapies (In Progress)     Topic: Physical Therapy (Done)     Point: Mobility training (Done)     Learning Progress Summary           Patient Acceptance, E,TB,D, VU,DU,NR by SAILAJA  at 6/3/2019 10:47 AM    Comment:  Education re: purpose of PT and importance of activity; education for improved technique w/ bed mobility, tfers and gait w/ use of rwx.  Education to pace activities due to shortness of breath and fatigue w/ minimal effort.   Family Acceptance, E,TB,D, VU,DU,NR by SAILAJA at 6/3/2019 10:47 AM    Comment:  Education re: purpose of PT and importance of activity; education for improved technique w/ bed mobility, tfers and gait w/ use of rwx.  Education to pace activities due to shortness of breath and fatigue w/ minimal effort.                   Point: Precautions (Done)     Learning Progress Summary           Patient Acceptance, E,TB,D, VU,DU,NR by SAILAJA at 6/3/2019 10:47 AM    Comment:  Education re: purpose of PT and importance of activity; education for improved technique w/ bed mobility, tfers and gait w/ use of rwx.  Education to pace activities due to shortness of breath and fatigue w/ minimal effort.   Family Acceptance, E,TB,D, VU,DU,NR by SAILAJA at 6/3/2019 10:47 AM    Comment:  Education re: purpose of PT and importance of activity; education for improved technique w/ bed mobility, tfers and gait w/ use of rwx.  Education to pace activities due to shortness of breath and fatigue w/ minimal effort.                               User Key     Initials Effective Dates Name Provider Type Discipline     08/02/18 -  Racheal Dorantes, PT Physical Therapist PT                PT Recommendation and Plan     Plan of Care Reviewed With: patient  Progress: improving  Outcome Summary: Pt sitting up in bedside chair, requested brief before walk.  Pt Min A Sit to Stand with RW for UE Support. Pt Ambulated 25' with RW and CGA then needed a standing rest break. additional 25' then another standing rest break. Pt fatigues easily with ambulation. Returned 25' x 2 back to room. Pt would greatly benefit from Rehab at minimum I'd suggest HH with PT to improve strength and conditioning.   Outcome Measures      Row Name 06/06/19 1303 06/05/19 1200          How much help from another is currently needed...    Putting on and taking off regular lower body clothing?  3  -  3  -TS     Bathing (including washing, rinsing, and drying)  3  -  3  -TS     Toileting (which includes using toilet bed pan or urinal)  3  -  3  -TS     Putting on and taking off regular upper body clothing  4  -  4  -TS     Taking care of personal grooming (such as brushing teeth)  4  -  4  -TS     Eating meals  4  -  4  -TS     Score  21  -  21  -TS        Functional Assessment    Outcome Measure Options  AM-PAC 6 Clicks Daily Activity (OT)  -  AM-PAC 6 Clicks Daily Activity (OT)  -TS       User Key  (r) = Recorded By, (t) = Taken By, (c) = Cosigned By    Initials Name Provider Type     Jose A Molina PINTO/L Occupational Therapy Assistant    Zenia Pickett COTA/L Occupational Therapy Assistant         Time Calculation:   PT Charges     Row Name 06/07/19 1302             Time Calculation    Start Time  1302  -      Stop Time  1331  -      Time Calculation (min)  29 min  -LG      PT Received On  06/07/19  -      PT Goal Re-Cert Due Date  06/13/19  -         Time Calculation- PT    Total Timed Code Minutes- PT  29 minute(s)  -LG        User Key  (r) = Recorded By, (t) = Taken By, (c) = Cosigned By    Initials Name Provider Type    LG Goran Williamson PTA Physical Therapy Assistant        Therapy Charges for Today     Code Description Service Date Service Provider Modifiers Qty    31525982812 HC PT THERAPEUTIC ACT EA 15 MIN 6/6/2019 Goran Williamson, PTA GP 2    20849491855 HC GAIT TRAINING EA 15 MIN 6/7/2019 Goran Williamson PTA GP 1    68858788992 HC PT THER PROC EA 15 MIN 6/7/2019 Goran Williamson, PTA GP 1          PT G-Codes  Outcome Measure Options: AM-PAC 6 Clicks Daily Activity (OT)  AM-PAC 6 Clicks Score: 14  Score: 21    Goran Williamson PTA  6/7/2019

## 2019-06-07 NOTE — DISCHARGE PLACEMENT REQUEST
"Mary Molina [phone]  Pt is going home today. They would like it delivered to her home tomorrow.     Karen Snell (74 y.o. Female)     Date of Birth Social Security Number Address Home Phone MRN    1945  [address] [place] [id number] [phone] [id number]    Judaism Marital Status          Other Single       Admission Date Admission Type Admitting Provider Attending Provider Department, Room/Bed    6/2/19 Emergency Torrie Weber DO Horn, Frances Marie, DO [facility] 4C, 462/1    Discharge Date Discharge Disposition Discharge Destination         Home-Health Care [facility]              Attending Provider:  Torrie Weber DO    Allergies:  No Known Allergies    Isolation:  None   Infection:  None   Code Status:  CPR    Ht:  170.2 cm (67\")   Wt:  112 kg (246 lb 7 oz)    Admission Cmt:  None   Principal Problem:  Pneumonia due to infectious organism [J18.9]                 Active Insurance as of 6/2/2019     Primary Coverage     Payor Plan Insurance Group Employer/Plan Group    MEDICARE MEDICARE A & B      Payor Plan Address Payor Plan Phone Number Payor Plan Fax Number Effective Dates    [address] [phone]  1/1/1996 - None Entered    [facility] [id number]       Subscriber Name Subscriber Birth Date Member ID       KAREN SNELL 1945 [id number]           Secondary Coverage     Payor Plan Insurance Group Employer/Plan Group    [facility] [place] [facility] [place]      Payor Plan Address Payor Plan Phone Number Payor Plan Fax Number Effective Dates    [address] [address]   1/1/2014 - None Entered    [place] [address]       Subscriber Name Subscriber Birth Date Member ID       KAREN SNELL 1945 [id number]                 Emergency Contacts      (Rel.) Home Phone Work Phone Mobile Phone    Leland Dickerson (Son) [phone] -- --        [address] [address]  Dept. Phone:  [phone]  Dept. Fax:   " "Date Ordered: 2019         Patient:  Karen Snell MRN:  [id number]   [address]  [place] [address] :  1945  SSN:    Phone: [phone] Sex:  F     Weight: 112 kg (246 lb 7 oz)         Ht Readings from Last 1 Encounters:   19 170.2 cm (67\")         Hospital Bed  (Order ID: [id number])    Diagnosis:  Impaired functional mobility, balance, gait, and endurance (Z74.09 [ICD-10-CM] V49.89 [ICD-9-CM])  Peripheral edema (R60.9 [ICD-10-CM] 782.3 [ICD-9-CM])  Generalized weakness (R53.1 [ICD-10-CM] 780.79 [ICD-9-CM])   Quantity:  1     Equipment:  Hospital Bed, Semi-Electirc w/ Mattress & w/ Rails  Length of Need (99 Months = Lifetime): 99 Months = Lifetime        Authorizing Provider's Phone: [phone]   Authorizing Provider:Zohreh Belcher APRN  Authorizing Provider's NPI: [id number]  Order Entered By: Zohreh Belcher APRN 2019  4:14 PM     Electronically signed by: Zohreh Belcher APRN 2019  4:14 PM               History & Physical      Dominga Gardner MD at 2019  6:06 AM              [facility] Medicine Services  HISTORY AND PHYSICAL    Date of Admission: 2019  Primary Care Physician: Eamon Elizalde APRN    Subjective     Chief Complaint: generalized weakness    History of Present Illness  Patient is a 74-year-old -American female with known past history of bilateral PE, GERD, hypertension who is brought to the ER by her son for complaints of generalized weakness and just not seeming herself.  He states he is having to help her in and out of the bed which is not normal for her and states that she has been having some mild confusion.  Patient denies to me any symptoms of chest pain however does state that she has some shortness of breath with activity which is fairly normal for her however may be slightly worse than usual.  She states she has some diarrhea after eating meals but resolves after that.  She " has some ongoing left-sided abdominal discomfort however she has not had a colonoscopy and EGD to evaluate this with no real findings.  She denies having any dysuria or fever.  She has been taking her blood thinner daily for diagnosis of PE about 1 year ago.  In the ER patient has a mildly elevated troponin.  Her blood pressure is marginal however it is been taking on her forearm at this time.  CT head and CT chest are being ordered and pending at this time.        Review of Systems     Otherwise complete ROS reviewed and negative except as mentioned in the HPI.    Past Medical History:   Past Medical History:   Diagnosis Date   • Arthritis    • Colon polyp    • DVT (deep venous thrombosis) (CMS/HCC)    • GERD (gastroesophageal reflux disease)    • Hypertension    • PE (pulmonary thromboembolism) (CMS/HCC)    • Sleep apnea      Past Surgical History:  Past Surgical History:   Procedure Laterality Date   • CHOLECYSTECTOMY     • COLONOSCOPY  09/24/2013   • COLONOSCOPY N/A 8/10/2017    Procedure: COLONOSCOPY WITH ANESTHESIA;  Surgeon: Jenna Page MD;  Location: [facility] ENDOSCOPY;  Service:    • ENDOSCOPY N/A 8/10/2017    Procedure: ESOPHAGOGASTRODUODENOSCOPY WITH ANESTHESIA;  Surgeon: Jenna Page MD;  Location: [facility] ENDOSCOPY;  Service:    • JOINT REPLACEMENT       Social History:  reports that she has quit smoking. She has never used smokeless tobacco. She reports that she does not drink alcohol or use drugs.    Family History: family history includes No Known Problems in her brother, daughter, father, maternal aunt, maternal grandmother, mother, paternal aunt, paternal grandmother, sister, and son.       Allergies:  No Known Allergies  Medications:  Prior to Admission medications    Medication Sig Start Date End Date Taking? Authorizing Provider   buPROPion XL (WELLBUTRIN XL) 300 MG 24 hr tablet Take 300 mg by mouth Daily.    Provider, MD Marixa   citalopram (CeleXA) 20 MG tablet Take 20 mg by mouth  "Daily.    Marixa Jansen MD   donepezil (ARICEPT) 10 MG tablet Take 10 mg by mouth Every Night.    Marixa Jansen MD   pravastatin (PRAVACHOL) 40 MG tablet Take 40 mg by mouth Daily.    Marixa Jansen MD   rivaroxaban (XARELTO) 20 MG tablet Take 20 mg by mouth Daily.    Marixa Jansen MD     Objective     Vital Signs: BP 95/51   Pulse 91   Temp 98.2 °F (36.8 °C)   Resp 20   Ht 170.2 cm (67\")   Wt 109 kg (240 lb)   SpO2 98%   BMI 37.59 kg/m²    Physical Exam   Constitutional: She is oriented to person, place, and time. She appears well-developed and well-nourished.   HENT:   Head: Normocephalic and atraumatic.   Eyes: Conjunctivae and EOM are normal. Pupils are equal, round, and reactive to light.   Neck: Neck supple. No JVD present. No thyromegaly present.   Cardiovascular: Normal rate, regular rhythm, normal heart sounds and intact distal pulses. Exam reveals no gallop and no friction rub.   No murmur heard.  Pulmonary/Chest: Effort normal and breath sounds normal. No respiratory distress. She has no wheezes. She has no rales. She exhibits no tenderness.   Abdominal: Soft. Bowel sounds are normal. She exhibits no distension. There is no tenderness. There is no rebound and no guarding.   Musculoskeletal: Normal range of motion. She exhibits edema. She exhibits no tenderness or deformity.   Left great toe with some mild tenderness, discoloration, some bogginess   Lymphadenopathy:     She has no cervical adenopathy.   Neurological: She is alert and oriented to person, place, and time. She displays normal reflexes. No cranial nerve deficit. She exhibits normal muscle tone.   Skin: Skin is warm and dry. No rash noted.   Psychiatric: She has a normal mood and affect. Her behavior is normal. Judgment and thought content normal.           Results Reviewed:  Lab Results (last 24 hours)     Procedure Component Value Units Date/Time    Urinalysis With Culture If Indicated - Urine, Catheter " [[id number]]  (Abnormal) Collected:  06/02/19 0356    Specimen:  Urine, Catheter Updated:  06/02/19 0431     Color, UA Yellow     Appearance, UA Cloudy     pH, UA <=5.0     Specific Gravity, UA 1.015     Glucose, UA Negative     Ketones, UA Negative     Bilirubin, UA Negative     Blood, UA Large (3+)     Protein, UA Trace     Leuk Esterase, UA Negative     Nitrite, UA Negative     Urobilinogen, UA 0.2 E.U./dL    Urinalysis, Microscopic Only - Urine, Catheter [[id number]]  (Abnormal) Collected:  06/02/19 0356    Specimen:  Urine, Catheter Updated:  06/02/19 0431     RBC, UA 0-2 /HPF      WBC, UA None Seen /HPF      Bacteria, UA None Seen /HPF      Squamous Epithelial Cells, UA 3-6 /HPF      Hyaline Casts, UA None Seen /LPF      Granular Casts, UA 0-2 /LPF      Amorphous Crystals, UA Small/1+ /HPF      Methodology Automated Microscopy    TSH [[id number]]  (Normal) Collected:  06/02/19 0230    Specimen:  Blood from Arm, Right Updated:  06/02/19 0359     TSH 3.820 mIU/mL     BNP [[id number]]  (Normal) Collected:  06/02/19 0252    Specimen:  Blood Updated:  06/02/19 0331     proBNP 665.0 pg/mL     Troponin [[id number]]  (Abnormal) Collected:  06/02/19 0252    Specimen:  Blood Updated:  06/02/19 0331     Troponin I 0.112 ng/mL     Comprehensive Metabolic Panel [[id number]]  (Abnormal) Collected:  06/02/19 0252    Specimen:  Blood Updated:  06/02/19 0319     Glucose 117 mg/dL      BUN 33 mg/dL      Creatinine 1.07 mg/dL      Sodium 135 mmol/L      Potassium 4.2 mmol/L      Chloride 107 mmol/L      CO2 23.0 mmol/L      Calcium 8.2 mg/dL      Total Protein 5.9 g/dL      Albumin 2.90 g/dL      ALT (SGPT) 229 U/L      AST (SGOT) 350 U/L      Alkaline Phosphatase 104 U/L      Total Bilirubin 0.6 mg/dL      eGFR   Amer 61 mL/min/1.73      Globulin 3.0 gm/dL      A/G Ratio 1.0 g/dL      BUN/Creatinine Ratio 30.8     Anion Gap 5.0 mmol/L     Narrative:       GFR Normal >60  Chronic Kidney Disease <60  Kidney Failure <15     Magnesium [[id number]]  (Normal) Collected:  06/02/19 0252    Specimen:  Blood Updated:  06/02/19 0319     Magnesium 1.7 mg/dL     D-dimer, Quantitative [[id number]]  (Abnormal) Collected:  06/02/19 0252    Specimen:  Blood Updated:  06/02/19 0318     D-Dimer, Quantitative 1.17 mg/L (FEU)     Narrative:       Reference Range is 0-0.50 mg/L FEU. However, results <0.50 mg/L FEU tends to rule out DVT or PE. Results >0.50 mg/L FEU are not useful in predicting absence or presence of DVT or PE.    Lactic Acid, Plasma [[id number]]  (Normal) Collected:  06/02/19 0252    Specimen:  Blood Updated:  06/02/19 0317     Lactate 1.4 mmol/L     CBC & Differential [[id number]] Collected:  06/02/19 0252    Specimen:  Blood Updated:  06/02/19 0308    Narrative:       The following orders were created for panel order CBC & Differential.  Procedure                               Abnormality         Status                     ---------                               -----------         ------                     CBC Auto Differential[[id number]]        Abnormal            Final result                 Please view results for these tests on the individual orders.    CBC Auto Differential [[id number]]  (Abnormal) Collected:  06/02/19 0252    Specimen:  Blood Updated:  06/02/19 0308     WBC 18.72 10*3/mm3      RBC 3.95 10*6/mm3      Hemoglobin 10.2 g/dL      Hematocrit 30.3 %      MCV 76.7 fL      MCH 25.8 pg      MCHC 33.7 g/dL      RDW 13.7 %      RDW-SD 38.4 fl      MPV 10.0 fL      Platelets 321 10*3/mm3      Neutrophil % 81.5 %      Lymphocyte % 7.6 %      Monocyte % 3.5 %      Eosinophil % 3.0 %      Basophil % 0.3 %      Immature Grans % 4.1 %      Neutrophils, Absolute 15.26 10*3/mm3      Lymphocytes, Absolute 1.42 10*3/mm3      Monocytes, Absolute 0.66 10*3/mm3      Eosinophils, Absolute 0.56 10*3/mm3      Basophils, Absolute 0.06 10*3/mm3      Immature Grans, Absolute 0.76 10*3/mm3      nRBC 0.0 /100 WBC     Blood Culture - Blood, Arm,  Right [[id number]] Collected:  06/02/19 0230    Specimen:  Blood from Arm, Right Updated:  06/02/19 0307    Blood Culture - Blood, Arm, Left [[id number]] Collected:  06/02/19 0240    Specimen:  Blood from Arm, Left Updated:  06/02/19 0307    POC Glucose Once [[id number]]  (Normal) Collected:  06/02/19 0211    Specimen:  Blood Updated:  06/02/19 0223     Glucose 113 mg/dL      Comment: : [id number] JamiMeter ID: [id number]           Imaging Results (last 24 hours)     Procedure Component Value Units Date/Time    XR Chest 1 View [[id number]] Updated:  06/02/19 0244        I have personally reviewed and interpreted the radiology studies and ECG obtained at time of admission.     Assessment / Plan     Assessment:   Active Hospital Problems    Diagnosis   • Inability to walk   • Elevated troponin   • Current use of long term anticoagulation   • Leukocytosis         Plan:    1. Check CT chest  2. CT head ordered by ER  3. Serial troponins. Pt with low risk for ischemia stress feb 2019  4. PT/OT consult  5. Add abx if ct chest concerning or any new sx to suggest infection.  6. Xray left great toe      Code Status: full     I discussed the patient's findings and my recommendations with the pt and son    Estimated length of stay 2 days    Dominga Gardner MD   06/02/19   6:15 AM            Electronically signed by Dominga Gardner MD at 6/2/2019  6:27 AM

## 2019-06-07 NOTE — THERAPY TREATMENT NOTE
Acute Care - Speech Language Pathology   Swallow Treatment Note [facility]     Patient Name: Karen Snell  : 1945  MRN: [id number]  Today's Date: 2019  Onset of Illness/Injury or Date of Surgery: 19     Referring Physician: Dr. Gardner      Admit Date: 2019  Meal observation completed. Patient was sitting in chair with family at bedside. She completed trials of lasagna with functional rotary chew. She tells me that she takes her time and this was observed during her meal today. She completed thin coke from edge of cup with her meal. 1x mild throat clear noted during meal. Voice remained clear throughout. SLP educated patient RE: proper positioning during PO, sitting upright after PO, slow rate, and small sips/bites. SLP also educated Re: increased importance for good oral care to decrease risk of complications if PO is aspirated. During VFSS thin trials were penetrated but no definite aspiration was observed. Patient is OK to continue mechanical soft diet with thin liquids following standard swallow precautions and oral care. SLP will follow PRN.   Indira Bullard MS CCC-SLP 2019 12:45 PM    Visit Dx:      ICD-10-CM ICD-9-CM   1. Inability to walk R26.2 719.7   2. Leukocytosis, unspecified type D72.829 288.60   3. Other dysphagia R13.19 787.29   4. Impaired functional mobility, balance, gait, and endurance Z74.09 V49.89   5. Decreased activities of daily living (ADL) R68.89 780.99     Patient Active Problem List   Diagnosis   • Colon polyps   • Epigastric pain   • History of DVT in adulthood   • Altered mental status   • Hernia, umbilical   • Abnormal finding on imaging   • Inability to walk   • Elevated troponin   • Current use of long term anticoagulation   • Leukocytosis   • Pneumonia due to infectious organism   • Generalized weakness   • Elevated transaminase level       Therapy Treatment  Rehabilitation Treatment Summary     Row Name 19 1227             Treatment  Time/Intention    Discipline  speech language pathologist  -MM      Document Type  therapy note (daily note)  -MM      Subjective Information  no complaints  -MM      Mode of Treatment  individual therapy;speech-language pathology  -MM      Patient/Family Observations  Family member present.   -MM      Care Plan Review  evaluation/treatment results reviewed;care plan/treatment goals reviewed;risks/benefits reviewed;patient/other agree to care plan  -MM      Care Plan Review, Other Participant(s)  family  -MM      Therapy Frequency (Swallow)  PRN  -MM      Patient Effort  good  -MM      Recorded by [MM] Indira Bullard MS CCC-SLP 06/07/19 1235      Row Name 06/07/19 1227             Pain Assessment    Additional Documentation  Pain Scale: Numbers Pre/Post-Treatment (Group)  -MM      Recorded by [MM] Indira Bullard MS CCC-SLP 06/07/19 1235      Row Name 06/07/19 1227             Pain Scale: Numbers Pre/Post-Treatment    Pain Scale: Numbers, Pretreatment  0/10 - no pain  -MM      Pain Scale: Numbers, Post-Treatment  0/10 - no pain  -MM      Recorded by [MM] Indira Bullard MS CCC-SLP 06/07/19 1235      Row Name 06/07/19 1227             Outcome Summary/Treatment Plan (SLP)    Daily Summary of Progress (SLP)  progress toward functional goals as expected  -MM      Barriers to Overall Progress (SLP)  none  -MM      Plan for Continued Treatment (SLP)  Continue mech soft and thin liquids.   -MM      Anticipated Dischage Disposition  home with assist  -MM      Recorded by [MM] Indira Bullard MS CCC-SLP 06/07/19 1235        User Key  (r) = Recorded By, (t) = Taken By, (c) = Cosigned By    Initials Name Effective Dates Discipline    MM Indira Bullard MS CCC-SLP 05/24/19 -  SLP          Outcome Summary  Outcome Summary/Treatment Plan (SLP)  Daily Summary of Progress (SLP): progress toward functional goals as expected (06/07/19 1227 : Indira Bullard MS CCC-SLP)  Barriers to Overall Progress  (SLP): none (06/07/19 1227 : Indira Bullard, MS CCC-SLP)  Plan for Continued Treatment (SLP): Continue [facility] soft and thin liquids.  (06/07/19 1227 : Indira Bullard, MS CCC-SLP)  Anticipated Dischage Disposition: home with assist (06/07/19 1227 : Indira Bullard, MS CCC-SLP)      SLP GOALS     Row Name 06/07/19 1227 06/06/19 1530 06/06/19 0950       Oral Nutrition/Hydration Goal 1 (SLP)    Oral Nutrition/Hydration Goal 1, SLP  LTG: Patient will tolerate LRD with no overt s/s of aspiration.  -MM  LTG: Patient will tolerate LRD with no overt s/s of aspiration.  -TM  LTG: Patient will tolerate LRD with no overt s/s of aspiration.  -MB    Time Frame (Oral Nutrition/Hydration Goal 1, SLP)  short term goal (STG);by discharge  -MM  short term goal (STG);by discharge  -TM  short term goal (STG);by discharge  -MB    Barriers (Oral Nutrition/Hydration Goal 1, SLP)  none  -MM  none  -TM  none  -MB    Progress/Outcomes (Oral Nutrition/Hydration Goal 1, SLP)  continuing progress toward goal  -MM  goal ongoing  -TM  continuing progress toward goal  -MB    Row Name 06/05/19 0816             Oral Nutrition/Hydration Goal 1 (SLP)    Oral Nutrition/Hydration Goal 1, SLP  LTG: Patient will tolerate LRD with no overt s/s of aspiration.  -MB (r) KK (t) MB (c)      Time Frame (Oral Nutrition/Hydration Goal 1, SLP)  short term goal (STG);by discharge  -MB (r) KK (t) MB (c)      Barriers (Oral Nutrition/Hydration Goal 1, SLP)  poor appetite  -MB (r) KK (t) MB (c)      Progress/Outcomes (Oral Nutrition/Hydration Goal 1, SLP)  continuing progress toward goal  -MB (r) KK (t) MB (c)        User Key  (r) = Recorded By, (t) = Taken By, (c) = Cosigned By    Initials Name Provider Type    Aroldo Cloud, CCC-SLP Speech and Language Pathologist    TM Letitia Redding, CCC-SLP Speech and Language Pathologist    MM Indira Bullard, MS CCC-SLP Speech and Language Pathologist    Magy Coronado, Speech Therapy Student Speech  Therapy Student          EDUCATION  The patient has been educated in the following areas:   Dysphagia (Swallowing Impairment) Oral Care/Hydration.    SLP Recommendation and Plan  Daily Summary of Progress (SLP): progress toward functional goals as expected  Barriers to Overall Progress (SLP): none  Plan for Continued Treatment (SLP): Continue mech soft and thin liquids.   Anticipated Dischage Disposition: home with assist                    Time Calculation:   Time Calculation- SLP     Row Name 06/07/19 1244             Time Calculation- SLP    SLP Start Time  1227  -MM      SLP Stop Time  1250  -MM      SLP Time Calculation (min)  23 min  -MM      SLP Received On  06/07/19  -MM        User Key  (r) = Recorded By, (t) = Taken By, (c) = Cosigned By    Initials Name Provider Type    Indira Fried MS CCC-SLP Speech and Language Pathologist          Therapy Charges for Today     Code Description Service Date Service Provider Modifiers Qty    [id number]  ST TREATMENT SWALLOW 2 6/7/2019 Indira Bullard MS CCC-SLP GN 1                 Indira Bullard MS CCC-SLP  6/7/2019

## 2019-06-07 NOTE — DISCHARGE PLACEMENT REQUEST
"[address] [address]  Dept. Phone:  [phone]  Dept. Fax:   Date Ordered: 2019         Patient:  Karen Snell MRN:  [id number]   [address]  [facility] [address] :  1945  SSN:    Phone: [phone] Sex:  F     Weight: 112 kg (246 lb 7 oz)         Ht Readings from Last 1 Encounters:   19 170.2 cm (67\")         Commode Chair          (Order ID: [id number])    Diagnosis:  Impaired functional mobility, balance, gait, and endurance (Z74.09 [ICD-10-CM] V49.89 [ICD-9-CM])  Decreased activities of daily living (ADL) (R68.89 [ICD-10-CM] 780.99 [ICD-9-CM])   Quantity:  1     Equipment:  Commode Chair w/ Detachable Arms  Length of Need (99 Months = Lifetime): 99 Months = Lifetime        Authorizing Provider's Phone: [phone]   Authorizing Provider:Zohreh Belcher APRN  Authorizing Provider's NPI: [id number]  Order Entered By: Zohreh Belcher APRN 2019  4:28 PM     Electronically signed by: Zohreh Belcher APRN 2019  4:28 PM              "

## 2019-06-07 NOTE — PLAN OF CARE
Problem: Patient Care Overview  Goal: Plan of Care Review  Outcome: Ongoing (interventions implemented as appropriate)   06/07/19 0406 06/07/19 1302   Coping/Psychosocial   Plan of Care Reviewed With --  patient   Coping/Psychosocial   Patient Agreement with Plan of Care agrees --    Plan of Care Review   Progress --  improving   OTHER   Outcome Summary --  Pt sitting up in bedside chair, requested brief before walk. Pt Min A Sit to Stand with RW for UE Support. Pt Ambulated 25' with RW and CGA then needed a standing rest break. additional 25' then another standing rest break. Pt fatigues easily with ambulation. Returned 25' x 2 back to room. Pt would greatly benefit from Rehab at minimum I'd suggest HH with PT to improve strength and conditioning.

## 2019-06-07 NOTE — PROGRESS NOTES
Continued Stay Note  [facility]     Patient Name: Karen Snell  MRN: [id number]  Today's Date: 6/7/2019    Admit Date: 6/2/2019    Discharge Plan     Row Name 06/07/19 1633       Plan    Plan  Home with [facility] and [facility]    Patient/Family in Agreement with Plan  yes    Final Note  Pt is being d/c'ed home today. She has orders for home health and already [facility]. Referral called to [facility] [phone]. Also contacted [facility] with [facility] at [phone] and faxing her the d/c summary and AVS at [phone]. Pt also has orders for a hospital bed and bsc. Spoke with pt's son, Leland, about options. They will go with [facility]. Referral called to [facility] [phone] and faxed to them at [phone]. Plan is for them to deliver tomorrow per family's request.         Discharge Codes    No documentation.       Expected Discharge Date and Time     Expected Discharge Date Expected Discharge Time    Jun 7, 2019             LEVAR Miles

## 2019-06-07 NOTE — PLAN OF CARE
Problem: Patient Care Overview  Goal: Plan of Care Review  Outcome: Ongoing (interventions implemented as appropriate)  No noticeable change from PNA standpoint - still on room air - in regards to overall deconditioning / weakness also no appreciable change - up x1 to BSC is taxing for her no acute distress will cont to monitor

## 2019-06-07 NOTE — PLAN OF CARE
Problem: Patient Care Overview  Goal: Plan of Care Review  Outcome: Ongoing (interventions implemented as appropriate)   06/07/19 3570   Coping/Psychosocial   Plan of Care Reviewed With patient   Plan of Care Review   Progress no change   OTHER   Outcome Summary Initial RDN eval. Pt reports fair appetite; states it's decreased when she doesn't feel good. Did interview for prefs, advised her of alt selections, and encouraged intake. She continues on [facility] soft diet as recommended per SLP and RDN added boost plus BID. Will continue to follow.

## 2019-06-07 NOTE — PLAN OF CARE
Problem: Patient Care Overview  Goal: Plan of Care Review  Outcome: Ongoing (interventions implemented as appropriate)   06/07/19 3156   Coping/Psychosocial   Plan of Care Reviewed With patient;family   Plan of Care Review   Progress improving   OTHER   Outcome Summary Meal observation completed. Patient was sitting in chair with family at bedside. She completed trials of lasagna with functional rotary chew. She tells me that she takes her time and this was observed during her meal today. She completed thin coke from edge of cup with her meal.1 x mild throat clear. Voice remained unchanged throughout meal.  SLP educated patient RE: proper positioning during PO, sitting upright after PO, slow rate, and small sips/bites. SLP also educated Re: increased importance for good oral care to decrease risk of complications if PO is aspirated. During VFSS thin trials were penetrated but no definite aspiration was observed. Patient is OK to continue mechanical soft diet with thin liquids following standard swallow precautions and oral care. SLP will follow PRN.

## 2019-06-07 NOTE — PROGRESS NOTES
Continued Stay Note  EMILY Peralta     Patient Name: Karen Snell  MRN: [id number]  Today's Date: 6/7/2019    Admit Date: 6/2/2019    Discharge Plan     Row Name 06/07/19 1059       Plan    Plan Comments  Pt still plans on discharging home with son at discharge with ABELD and KATHARINE. Pt is still doing well with PT. SW will follow for HH orders at discharge.         Discharge Codes    No documentation.             Natasha Riggs

## 2019-06-08 NOTE — OUTREACH NOTE
Prep Survey      Responses   Facility patient discharged from?  [facility]   Is patient eligible?  Yes   Discharge diagnosis  Pneumonia d/t infectious organism, elevated transaminase level, elevated troponin, long term anticoagulation, leukocytosisi, generalized weakness   Does the patient have one of the following disease processes/diagnoses(primary or secondary)?  COPD/Pneumonia   Does the patient have Home health ordered?  Yes   What is the Home health agency?   [facility]   Is there a DME ordered?  Yes   What DME was ordered?  Hospital bed and BSC from [facility]   Prep survey completed?  Yes          Kusum Amador RN

## 2019-06-08 NOTE — THERAPY DISCHARGE NOTE
Acute Care - Physical Therapy Discharge Summary  [facility]       Patient Name: Karen Snell  : 1945  MRN: [id number]    Today's Date: 2019  Onset of Illness/Injury or Date of Surgery: 19    Date of Referral to PT: 19  Referring Physician: Dr. Gardner      Admit Date: 2019      PT Recommendation and Plan    Visit Dx:    ICD-10-CM ICD-9-CM   1. Inability to walk R26.2 719.7   2. Leukocytosis, unspecified type D72.829 288.60   3. Other dysphagia R13.19 787.29   4. Impaired functional mobility, balance, gait, and endurance Z74.09 V49.89   5. Decreased activities of daily living (ADL) R68.89 780.99   6. Peripheral edema R60.9 782.3   7. Pneumonia of both lungs due to infectious organism, unspecified part of lung J18.9 483.8   8. Generalized weakness R53.1 780.79   9. Elevated transaminase level R74.0 790.4       Outcome Measures     Row Name 19 1025 19 1303 19 1200       How much help from another is currently needed...    Putting on and taking off regular lower body clothing?  3  -  3  -  3  -TS    Bathing (including washing, rinsing, and drying)  3  -  3  -  3  -TS    Toileting (which includes using toilet bed pan or urinal)  3  -  3  -  3  -TS    Putting on and taking off regular upper body clothing  4  -  4  -  4  -TS    Taking care of personal grooming (such as brushing teeth)  4  -  4  -CJ  4  -TS    Eating meals  4  -  4  -  4  -TS    Score  21  -  21  -  21  -TS       Functional Assessment    Outcome Measure Options  AM-PAC 6 Clicks Daily Activity (OT)  -  AM-PAC 6 Clicks Daily Activity (OT)  -  AM-PAC 6 Clicks Daily Activity (OT)  -TS      User Key  (r) = Recorded By, (t) = Taken By, (c) = Cosigned By    Initials Name Provider Type     Jose A Molina COTA/L Occupational Therapy Assistant    TS Zenia Ibarra COTA/L Occupational Therapy Assistant              Rehab Goal Summary     Row Name 19 0726             Bed  Mobility Goal 1 (PT)    Activity/Assistive Device (Bed Mobility Goal 1, PT)  bed mobility activities, all  -MF      Dixie Level/Cues Needed (Bed Mobility Goal 1, PT)  conditional independence  -MF      Time Frame (Bed Mobility Goal 1, PT)  long term goal (LTG);10 days  -MF      Progress/Outcomes (Bed Mobility Goal 1, PT)  goal not met  -MF         Transfer Goal 1 (PT)    Activity/Assistive Device (Transfer Goal 1, PT)  sit-to-stand/stand-to-sit;bed-to-chair/chair-to-bed;walker, rolling  -MF      Dixie Level/Cues Needed (Transfer Goal 1, PT)  standby assist  -MF      Time Frame (Transfer Goal 1, PT)  long term goal (LTG);10 days  -MF      Progress/Outcome (Transfer Goal 1, PT)  goal partially met  -MF         Gait Training Goal 1 (PT)    Activity/Assistive Device (Gait Training Goal 1, PT)  gait (walking locomotion);assistive device use;decrease fall risk;forward stepping;backward stepping;improve balance and speed;increase endurance/gait distance;increase energy conservation;normalize weight shifts;walker, rolling  -MF      Dixie Level (Gait Training Goal 1, PT)  standby assist  -MF      Distance (Gait Goal 1, PT)  50 - 75 ft w/ less than or equal to 1 standing rest  -MF      Time Frame (Gait Training Goal 1, PT)  long term goal (LTG);10 days  -MF      Progress/Outcome (Gait Training Goal 1, PT)  goal not met  -MF         Patient Education Goal (PT)    Activity (Patient Education Goal, PT)  activities to assist w/ edema mgmt  -MF      Dixie/Cues/Accuracy (Memory Goal 2, PT)  demonstrates adequately;independent;verbalizes understanding  -MF      Time Frame (Patient Education Goal, PT)  long term goal (LTG);10 days  -MF      Progress/Outcome (Patient Education Goal, PT)  goal not met  -MF        User Key  (r) = Recorded By, (t) = Taken By, (c) = Cosigned By    Initials Name Provider Type Discipline    Molly Knapp, PTA Physical Therapy Assistant PT              PT Discharge  Summary  Anticipated Discharge Disposition (PT): home with home health  Reason for Discharge: Discharge from facility  Outcomes Achieved: Unable to make functional progress toward goals at this time  Discharge Destination: Home with home health      Molly Ybarra, PTA   6/8/2019

## 2019-06-10 NOTE — THERAPY DISCHARGE NOTE
Acute Care - Speech Language Pathology Discharge Summary  [facility]       Patient Name: Karen Snell  : 1945  MRN: [id number]    Today's Date: 6/10/2019  Onset of Illness/Injury or Date of Surgery: 19       Referring Physician: Dr. Gardner        Admit Date: 2019      SLP Recommendation and Plan  Mechanical soft solids and thin liquids    Visit Dx:    ICD-10-CM ICD-9-CM   1. Inability to walk R26.2 719.7   2. Leukocytosis, unspecified type D72.829 288.60   3. Other dysphagia R13.19 787.29   4. Impaired functional mobility, balance, gait, and endurance Z74.09 V49.89   5. Decreased activities of daily living (ADL) R68.89 780.99   6. Peripheral edema R60.9 782.3   7. Pneumonia of both lungs due to infectious organism, unspecified part of lung J18.9 483.8   8. Generalized weakness R53.1 780.79   9. Elevated transaminase level R74.0 790.4               SLP GOALS     Row Name 06/10/19 1500             Oral Nutrition/Hydration Goal 1 (SLP)    Oral Nutrition/Hydration Goal 1, SLP  LTG: Patient will tolerate LRD with no overt s/s of aspiration.  -MB      Time Frame (Oral Nutrition/Hydration Goal 1, SLP)  short term goal (STG);by discharge  -MB      Progress/Outcomes (Oral Nutrition/Hydration Goal 1, SLP)  goal partially met  -MB        User Key  (r) = Recorded By, (t) = Taken By, (c) = Cosigned By    Initials Name Provider Type    Aroldo Cloud CCC-SLP Speech and Language Pathologist                  SLP Discharge Summary  Anticipated Dischage Disposition: home with assist  Reason for Discharge: discharge from this facility  Progress Toward Achieving Short/long Term Goals: goals partially met within established timelines  Discharge Destination: home w/ home health      Aroldo Patel CCC-SLP  6/10/2019

## 2019-06-10 NOTE — DISCHARGE PLACEMENT REQUEST
"[facility]  MERRILL GATES RN CM [id number]      Karen Snell (74 y.o. Female)     Date of Birth Social Security Number Address Home Phone MRN    1945  [address] [facility] [phone] [id number]    [place] Marital Status          Other Single       Admission Date Admission Type Admitting Provider Attending Provider Department, Room/Bed    6/2/19 Emergency Torrie Weber DO  [facility] 4C, 462/1    Discharge Date Discharge Disposition Discharge Destination        [facility]              Attending Provider:  (none)   Allergies:  No Known Allergies    Isolation:  None   Infection:  None   Code Status:  Prior    Ht:  170.2 cm (67\")   Wt:  112 kg (246 lb 7 oz)    Admission Cmt:  None   Principal Problem:  Pneumonia due to infectious organism [J18.9]                 Active Insurance as of 6/2/2019     Primary Coverage     Payor Plan Insurance Group Employer/Plan Group    MEDICARE MEDICARE A & B      Payor Plan Address Payor Plan Phone Number Payor Plan Fax Number Effective Dates    [address] [phone]  1/1/1996 - None Entered    [facility] [id number]       Subscriber Name Subscriber Birth Date Member ID       KAREN SNELL 1945 [id number]           Secondary Coverage     Payor Plan Insurance Group Employer/Plan Group    [facility] [place] [facility]      Payor Plan Address Payor Plan Phone Number Payor Plan Fax Number Effective Dates    [address] [address]   1/1/2014 - None Entered    [facility] [place] [address]       Subscriber Name Subscriber Birth Date Member ID       KAREN SNELL YOLIS 1945 [id number]                 Emergency Contacts      (Rel.) Home Phone Work Phone Mobile Phone    Leland Dickerson (Son) [phone] -- --               Discharge Summary      Torrie Weber DO at 6/7/2019  4:14 PM              [facility]  DISCHARGE SUMMARY "       Date of Admission: 6/2/2019  Date of Discharge:  6/7/2019  Primary Care Physician: Eamon Elizalde APRN    Presenting Problem/History of Present Illness:  Generalized weakness     Final Discharge Diagnoses:  Active Hospital Problems    Diagnosis   • **Pneumonia due to infectious organism   • Elevated transaminase level   • Elevated troponin   • Current use of long term anticoagulation   • Leukocytosis   • Generalized weakness     Consults: None    Procedures Performed: None    Pertinent Test Results:   Lab Results (all)     Procedure Component Value Units Date/Time    Manual Differential [[id number]]  (Abnormal) Collected:  06/07/19 0655    Specimen:  Blood Updated:  06/07/19 0727     Neutrophil % 79.0 %      Lymphocyte % 6.0 %      Monocyte % 6.0 %      Eosinophil % 1.0 %      Bands %  5.0 %      Metamyelocyte % 2.0 %      Myelocyte % 1.0 %      Neutrophils Absolute 18.96 10*3/mm3      Lymphocytes Absolute 1.35 10*3/mm3      Monocytes Absolute 1.35 10*3/mm3      Eosinophils Absolute 0.23 10*3/mm3      RBC Morphology Normal     WBC Morphology Normal     Platelet Morphology Normal    CBC Auto Differential [[id number]]  (Abnormal) Collected:  06/07/19 0655    Specimen:  Blood Updated:  06/07/19 0727     WBC 22.57 10*3/mm3      RBC 3.97 10*6/mm3      Hemoglobin 10.2 g/dL      Hematocrit 30.5 %      MCV 76.8 fL      MCH 25.7 pg      MCHC 33.4 g/dL      RDW 14.0 %      RDW-SD 39.0 fl      MPV 9.7 fL      Platelets 354 10*3/mm3     Comprehensive Metabolic Panel [[id number]]  (Abnormal) Collected:  06/07/19 0655    Specimen:  Blood Updated:  06/07/19 0716     Glucose 110 mg/dL      BUN 25 mg/dL      Creatinine 0.83 mg/dL      Sodium 134 mmol/L      Potassium 4.4 mmol/L      Chloride 108 mmol/L      CO2 23.0 mmol/L      Calcium 8.2 mg/dL      Total Protein 5.7 g/dL      Albumin 2.50 g/dL      ALT (SGPT) 243 U/L      AST (SGOT) 276 U/L      Alkaline Phosphatase 93 U/L      Total Bilirubin 0.4 mg/dL      eGFR    Amer 82 mL/min/1.73      Globulin 3.2 gm/dL      A/G Ratio 0.8 g/dL      BUN/Creatinine Ratio 30.1     Anion Gap 3.0 mmol/L     Blood Culture - Blood, Arm, Right [[id number]] Collected:  06/02/19 0230    Specimen:  Blood from Arm, Right Updated:  06/07/19 0315     Blood Culture No growth at 5 days    Blood Culture - Blood, Arm, Left [[id number]] Collected:  06/02/19 0240    Specimen:  Blood from Arm, Left Updated:  06/07/19 0315     Blood Culture No growth at 5 days    CBC Auto Differential [[id number]]  (Abnormal) Collected:  06/06/19 0640    Specimen:  Blood Updated:  06/06/19 0758     WBC 18.48 10*3/mm3      RBC 3.74 10*6/mm3      Hemoglobin 9.6 g/dL      Hematocrit 28.7 %      MCV 76.7 fL      MCH 25.7 pg      MCHC 33.4 g/dL      RDW 14.2 %      RDW-SD 39.5 fl      MPV 9.9 fL      Platelets 300 10*3/mm3     Manual Differential [[id number]]  (Abnormal) Collected:  06/06/19 0640    Specimen:  Blood Updated:  06/06/19 0758     Neutrophil % 80.0 %      Lymphocyte % 11.0 %      Monocyte % 2.0 %      Eosinophil % 2.0 %      Bands %  4.0 %      Myelocyte % 1.0 %      Neutrophils Absolute 15.52 10*3/mm3      Lymphocytes Absolute 2.03 10*3/mm3      Monocytes Absolute 0.37 10*3/mm3      Eosinophils Absolute 0.37 10*3/mm3      Microcytes Slight/1+     Poikilocytes Slight/1+     WBC Morphology Normal     Platelet Morphology Normal    Basic Metabolic Panel [[id number]]  (Abnormal) Collected:  06/06/19 0640    Specimen:  Blood Updated:  06/06/19 0741     Glucose 105 mg/dL      BUN 25 mg/dL      Creatinine 0.98 mg/dL      Sodium 135 mmol/L      Potassium 4.2 mmol/L      Chloride 107 mmol/L      CO2 24.0 mmol/L      Calcium 7.9 mg/dL      eGFR  African Amer 67 mL/min/1.73      BUN/Creatinine Ratio 25.5     Anion Gap 4.0 mmol/L     CBC Auto Differential [[id number]]  (Abnormal) Collected:  06/05/19 0553    Specimen:  Blood Updated:  06/05/19 0643     WBC 18.46 10*3/mm3      RBC 3.81 10*6/mm3      Hemoglobin 10.0 g/dL      Hematocrit  29.3 %      MCV 76.9 fL      MCH 26.2 pg      MCHC 34.1 g/dL      RDW 14.1 %      RDW-SD 39.3 fl      MPV 9.7 fL      Platelets 323 10*3/mm3     Manual Differential [[id number]]  (Abnormal) Collected:  06/05/19 0553    Specimen:  Blood Updated:  06/05/19 0643     Neutrophil % 89.0 %      Lymphocyte % 2.0 %      Monocyte % 2.0 %      Eosinophil % 2.0 %      Bands %  1.0 %      Myelocyte % 2.0 %      Atypical Lymphocyte % 2.0 %      Neutrophils Absolute 16.61 10*3/mm3      Lymphocytes Absolute 0.37 10*3/mm3      Monocytes Absolute 0.37 10*3/mm3      Eosinophils Absolute 0.37 10*3/mm3      Anisocytosis Slight/1+     Microcytes Slight/1+     Target Cells Slight/1+     WBC Morphology Normal     Platelet Morphology Normal    Comprehensive Metabolic Panel [[id number]]  (Abnormal) Collected:  06/05/19 0553    Specimen:  Blood Updated:  06/05/19 0630     Glucose 94 mg/dL      BUN 27 mg/dL      Creatinine 0.97 mg/dL      Sodium 137 mmol/L      Potassium 4.3 mmol/L      Chloride 109 mmol/L      CO2 24.0 mmol/L      Calcium 8.0 mg/dL      Total Protein 4.8 g/dL      Albumin 2.10 g/dL      ALT (SGPT) 222 U/L      AST (SGOT) 323 U/L      Alkaline Phosphatase 97 U/L      Total Bilirubin 0.5 mg/dL      eGFR  African Amer 68 mL/min/1.73      Globulin 2.7 gm/dL      A/G Ratio 0.8 g/dL      BUN/Creatinine Ratio 27.8     Anion Gap 4.0 mmol/L     CBC Auto Differential [[id number]]  (Abnormal) Collected:  06/04/19 0535    Specimen:  Blood Updated:  06/04/19 0624     WBC 17.69 10*3/mm3      RBC 3.69 10*6/mm3      Hemoglobin 9.7 g/dL      Hematocrit 29.1 %      MCV 78.9 fL      MCH 26.3 pg      MCHC 33.3 g/dL      RDW 13.9 %      RDW-SD 39.8 fl      MPV 10.1 fL      Platelets 298 10*3/mm3      nRBC 0.0 /100 WBC     Manual Differential [[id number]]  (Abnormal) Collected:  06/04/19 0535    Specimen:  Blood Updated:  06/04/19 0624     Neutrophil % 78.0 %      Lymphocyte % 12.0 %      Monocyte % 3.0 %      Basophil % 1.0 %      Bands %  2.0 %       Metamyelocyte % 2.0 %      Myelocyte % 1.0 %      Atypical Lymphocyte % 1.0 %      Neutrophils Absolute 14.15 10*3/mm3      Lymphocytes Absolute 2.12 10*3/mm3      Monocytes Absolute 0.53 10*3/mm3      Basophils Absolute 0.18 10*3/mm3      RBC Morphology Normal     WBC Morphology Normal     Platelet Morphology Normal    Comprehensive Metabolic Panel [[id number]]  (Abnormal) Collected:  06/04/19 0535    Specimen:  Blood Updated:  06/04/19 0619     Glucose 90 mg/dL      BUN 25 mg/dL      Creatinine 0.98 mg/dL      Sodium 137 mmol/L      Potassium 4.1 mmol/L      Chloride 109 mmol/L      CO2 24.0 mmol/L      Calcium 8.0 mg/dL      Total Protein 4.9 g/dL      Albumin 2.10 g/dL      ALT (SGPT) 206 U/L      AST (SGOT) 320 U/L      Alkaline Phosphatase 93 U/L      Total Bilirubin 0.5 mg/dL      eGFR  African Amer 67 mL/min/1.73      Globulin 2.8 gm/dL      A/G Ratio 0.8 g/dL      BUN/Creatinine Ratio 25.5     Anion Gap 4.0 mmol/L     S. Pneumo Ag Urine or CSF - Urine, Urine, Catheter [[id number]]  (Normal) Collected:  06/02/19 0356    Specimen:  Urine, Catheter Updated:  06/03/19 0954     Strep Pneumo Ag Negative    Legionella Antigen, Urine - Urine, Urine, Catheter [[id number]]  (Normal) Collected:  06/02/19 0356    Specimen:  Urine, Catheter Updated:  06/03/19 0953     LEGIONELLA ANTIGEN, URINE Negative    Comprehensive Metabolic Panel [[id number]]  (Abnormal) Collected:  06/03/19 0754    Specimen:  Blood Updated:  06/03/19 0926     Glucose 87 mg/dL      BUN 26 mg/dL      Creatinine 0.95 mg/dL      Sodium 136 mmol/L      Potassium 4.3 mmol/L      Chloride 109 mmol/L      CO2 24.0 mmol/L      Calcium 7.9 mg/dL      Total Protein 5.1 g/dL      Albumin 2.40 g/dL      ALT (SGPT) 198 U/L      AST (SGOT) 283 U/L      Alkaline Phosphatase 96 U/L      Total Bilirubin 0.6 mg/dL      eGFR  African Amer 70 mL/min/1.73      Globulin 2.7 gm/dL      A/G Ratio 0.9 g/dL      BUN/Creatinine Ratio 27.4     Anion Gap 3.0 mmol/L     CBC Auto  Differential [[id number]]  (Abnormal) Collected:  06/03/19 0754    Specimen:  Blood Updated:  06/03/19 0917     WBC 17.02 10*3/mm3      RBC 3.89 10*6/mm3      Hemoglobin 10.2 g/dL      Hematocrit 31.0 %      MCV 79.7 fL      MCH 26.2 pg      MCHC 32.9 g/dL      RDW 13.9 %      RDW-SD 40.1 fl      MPV 10.2 fL      Platelets 308 10*3/mm3     Manual Differential [[id number]]  (Abnormal) Collected:  06/03/19 0754    Specimen:  Blood Updated:  06/03/19 0917     Neutrophil % 81.0 %      Lymphocyte % 5.0 %      Monocyte % 4.0 %      Bands %  2.0 %      Metamyelocyte % 4.0 %      Myelocyte % 1.0 %      Promyelocyte % 1.0 %      Atypical Lymphocyte % 2.0 %      Neutrophils Absolute 14.13 10*3/mm3      Lymphocytes Absolute 0.85 10*3/mm3      Monocytes Absolute 0.68 10*3/mm3      Hypochromia Slight/1+     WBC Morphology Normal     Platelet Morphology Normal    Basic Metabolic Panel [[id number]]  (Abnormal) Collected:  06/03/19 0754    Specimen:  Blood Updated:  06/03/19 0849     Glucose 87 mg/dL      BUN 26 mg/dL      Creatinine 0.95 mg/dL      Sodium 136 mmol/L      Potassium 4.3 mmol/L      Chloride 109 mmol/L      CO2 24.0 mmol/L      Calcium 7.9 mg/dL      eGFR  African Amer 70 mL/min/1.73      BUN/Creatinine Ratio 27.4     Anion Gap 3.0 mmol/L     Troponin [[id number]]  (Abnormal) Collected:  06/02/19 1848    Specimen:  Blood Updated:  06/02/19 1918     Troponin I 0.101 ng/mL     Troponin [[id number]]  (Abnormal) Collected:  06/02/19 1129    Specimen:  Blood Updated:  06/02/19 1157     Troponin I 0.092 ng/mL     Troponin [[id number]]  (Abnormal) Collected:  06/02/19 0705    Specimen:  Blood Updated:  06/02/19 0742     Troponin I 0.109 ng/mL     Urinalysis With Culture If Indicated - Urine, Catheter [[id number]]  (Abnormal) Collected:  06/02/19 0356    Specimen:  Urine, Catheter Updated:  06/02/19 0431     Color, UA Yellow     Appearance, UA Cloudy     pH, UA <=5.0     Specific Gravity, UA 1.015     Glucose, UA Negative      Ketones, UA Negative     Bilirubin, UA Negative     Blood, UA Large (3+)     Protein, UA Trace     Leuk Esterase, UA Negative     Nitrite, UA Negative     Urobilinogen, UA 0.2 E.U./dL    Urinalysis, Microscopic Only - Urine, Catheter [[id number]]  (Abnormal) Collected:  06/02/19 0356    Specimen:  Urine, Catheter Updated:  06/02/19 0431     RBC, UA 0-2 /HPF      WBC, UA None Seen /HPF      Bacteria, UA None Seen /HPF      Squamous Epithelial Cells, UA 3-6 /HPF      Hyaline Casts, UA None Seen /LPF      Granular Casts, UA 0-2 /LPF      Amorphous Crystals, UA Small/1+ /HPF      Methodology Automated Microscopy    TSH [[id number]]  (Normal) Collected:  06/02/19 0230    Specimen:  Blood from Arm, Right Updated:  06/02/19 0359     TSH 3.820 mIU/mL     BNP [[id number]]  (Normal) Collected:  06/02/19 0252    Specimen:  Blood Updated:  06/02/19 0331     proBNP 665.0 pg/mL     Troponin [[id number]]  (Abnormal) Collected:  06/02/19 0252    Specimen:  Blood Updated:  06/02/19 0331     Troponin I 0.112 ng/mL     Comprehensive Metabolic Panel [[id number]]  (Abnormal) Collected:  06/02/19 0252    Specimen:  Blood Updated:  06/02/19 0319     Glucose 117 mg/dL      BUN 33 mg/dL      Creatinine 1.07 mg/dL      Sodium 135 mmol/L      Potassium 4.2 mmol/L      Chloride 107 mmol/L      CO2 23.0 mmol/L      Calcium 8.2 mg/dL      Total Protein 5.9 g/dL      Albumin 2.90 g/dL      ALT (SGPT) 229 U/L      AST (SGOT) 350 U/L      Alkaline Phosphatase 104 U/L      Total Bilirubin 0.6 mg/dL      eGFR   Amer 61 mL/min/1.73      Globulin 3.0 gm/dL      A/G Ratio 1.0 g/dL      BUN/Creatinine Ratio 30.8     Anion Gap 5.0 mmol/L     Magnesium [[id number]]  (Normal) Collected:  06/02/19 0252    Specimen:  Blood Updated:  06/02/19 0319     Magnesium 1.7 mg/dL     D-dimer, Quantitative [[id number]]  (Abnormal) Collected:  06/02/19 0252    Specimen:  Blood Updated:  06/02/19 0318     D-Dimer, Quantitative 1.17 mg/L (FEU)     CBC Auto Differential  [[id number]]  (Abnormal) Collected:  06/02/19 0252    Specimen:  Blood Updated:  06/02/19 0308     WBC 18.72 10*3/mm3      RBC 3.95 10*6/mm3      Hemoglobin 10.2 g/dL      Hematocrit 30.3 %      MCV 76.7 fL      MCH 25.8 pg      MCHC 33.7 g/dL      RDW 13.7 %      RDW-SD 38.4 fl      MPV 10.0 fL      Platelets 321 10*3/mm3      Neutrophil % 81.5 %      Lymphocyte % 7.6 %      Monocyte % 3.5 %      Eosinophil % 3.0 %      Basophil % 0.3 %      Immature Grans % 4.1 %      Neutrophils, Absolute 15.26 10*3/mm3      Lymphocytes, Absolute 1.42 10*3/mm3      Monocytes, Absolute 0.66 10*3/mm3      Eosinophils, Absolute 0.56 10*3/mm3      Basophils, Absolute 0.06 10*3/mm3      Immature Grans, Absolute 0.76 10*3/mm3      nRBC 0.0 /100 WBC     POC Glucose Once [[id number]]  (Normal) Collected:  06/02/19 0211    Specimen:  Blood Updated:  06/02/19 0223     Glucose 113 mg/dL      Comment: : [id number] JamiMeter ID: [id number]           Imaging Results (all)     Procedure Component Value Units Date/Time    FL Video Swallow With Speech [[id number]] Collected:  06/06/19 1654     Updated:  06/07/19 0749    Narrative:          HISTORY  74 years old with dysphagia; R26.2-Difficulty in walking, not elsewhere  classified; D72.829-Elevated white blood cell count, unspecified;  R13.19-Other dysphagia; Z74.09-Other reduced mobility; R68.89-Other  general symptoms and signs.     REFERENCE STUDY.  None.     FINDINGS.  The patient swallowed multiple consistencies of barium showing  penetration with nectar thick and thin consistencies. No definite  aspiration.     Please see speech pathology report for further details.     Total images 1. Fluoroscopic time 1.5 minutes.  This report was finalized on 06/07/2019 07:46 by Dr Rip Gutierrez, .    XR Shoulder 2+ View Bilateral [[id number]] Collected:  06/05/19 1631     Updated:  06/05/19 1635    Narrative:       EXAMINATION: XR SHOULDER 2+ VW BILATERAL-     6/5/2019 1:56 PM CDT     HISTORY:  Shoulder pain; R26.2-Difficulty in walking, not elsewhere  classified; D72.829-Elevated white blood cell count, unspecified;  R13.19-Other dysphagia; Z74.09-Other reduced mobility; R68.89-Other  general symptoms and signs     Bilateral shoulders, 4 views.     OsteoArthritic spurring at each AC joint and each humeral head.     No fracture or dislocation.     Visualized upper ribs are intact.     No AC joint separation.     Summary:  1. Fairly symmetric osteoarthritic spurring at both AC joints and both  shoulder joints.  This report was finalized on 06/05/2019 16:32 by Dr. Julián Garcia MD.    US Venous Doppler Lower Extremity Bilateral (duplex) [[id number]] Collected:  06/03/19 1809     Updated:  06/03/19 1813    Narrative:       History: Swelling       Impression:       Impression: There is no evidence of deep venous thrombosis or  superficial thrombophlebitis of right or left lower extremities.     Comments: Bilateral lower extremity venous duplex exam was performed  using color Doppler flow, Doppler waveform analysis, and grayscale  imaging, with and without compression. There is no evidence of deep  venous thrombosis in the common femoral, superficial femoral, and  popliteal veins bilaterally. The tibial veins were not visualized  bilaterally due to body habitus. No thrombus is identified in the  saphenofemoral junctions and greater saphenous veins bilaterally.         This report was finalized on 06/03/2019 18:09 by Dr. Eriberto Leonard MD.    XR Chest PA & Lateral [[id number]] Collected:  06/03/19 1108     Updated:  06/03/19 1113    Narrative:       EXAMINATION:  XR CHEST PA AND LATERAL-  6/3/2019 10:54 AM CDT     HISTORY: Shortness of air. R26.2-Difficulty in walking, not elsewhere  classified; D72.829-Elevated white blood cell count, unspecified;  R13.19-Other dysphagia; Z74.09-Other reduced mobility; R68.89-Other  general symptoms and signs.     COMPARISON: 06/02/2019.     TECHNIQUE: 2 views were obtained.      FINDINGS:  There is hazy density in the right lung base. There is mild  blunting of the right costophrenic angle. The left lung is essentially  clear. There is cardiomegaly. There are degenerative changes of the  spine and shoulders.       Impression:       1. Minimal hazy infiltrate at the right lung base. Mild blunting of the  right costophrenic angle may represent minimal fluid.  2. Cardiomegaly.         This report was finalized on 06/03/2019 11:10 by Dr. Terry Rivera MD.    XR Toe 2+ View Left [[id number]] Collected:  06/02/19 1113     Updated:  06/02/19 1117    Narrative:       EXAMINATION: XR TOE 2+ VW LEFT-     6/2/2019 10:40 AM CDT     HISTORY: wound; R26.2-Difficulty in walking, not elsewhere classified;  D72.829-Elevated white blood cell count, unspecified     Right toes, 2 views.     A discrete soft tissue defect is not seen.  There is no foreign body.     The bones are osteopenic.     There is focal erosion at the first metatarsal head which may be related  to gout or other erosive arthritis.     There is also osteoarthritic related joint disease.     No acute fracture is seen.     Summary:  1. First metatarsal head bony erosion.  2. No foreign body or fracture.  This report was finalized on 06/02/2019 11:14 by Dr. Julián Garcia MD.    XR Chest 1 View [[id number]] Collected:  06/02/19 0840     Updated:  06/02/19 0843    Narrative:       EXAMINATION: XR CHEST 1 VW-     6/2/2019 2:41 AM CDT     HISTORY: Short of breath.     1 view chest x-ray compared with 02/01/2019.     Findings heart size.  Normal mediastinum.     Full expanded lungs with chronic interstitial disease and a few  granulomas.     No focal infiltrate, pneumothorax, or heart failure.     Summary:  1. Chronic lung changes.     This report was finalized on 06/02/2019 08:40 by Dr. Julián Garcia MD.    CT Chest Without Contrast [[id number]] Collected:  06/02/19 0814     Updated:  06/02/19 0821    Narrative:       EXAMINATION: CT CHEST WO  CONTRAST-      6/2/2019 6:08 AM CDT     HISTORY: COPD exacerbation, complicated; R26.2-Difficulty in walking,  not elsewhere classified; D72.829-Elevated white blood cell count,  unspecified     In order to have a CT radiation dose as low as reasonably achievable  Automated Exposure Control was utilized for adjustment of the mA and/or  KV according to patient size.     DLP in mGycm= 265.     Noncontrast chest CT compared with 08/08/2016.     A preliminary [facility] report was faxed to the Emergency Department  immediately after this study was interpreted at 7:28 AM.     Cardiomegaly. Trace amount pericardial fluid.  No thoracic aortic aneurysm.  No mediastinal mass.     Patchy bibasilar infiltrate compatible with developing pneumonia. No  dense consolidation or pleural fluid.  No pneumothorax or heart failure.     Extensive degenerative thoracic spurring.  Stable benign-appearing 2 cm low-density lesion within the left hepatic  lobe.  Incidental mid esophageal fluid noted.     Summary:  1. Bibasilar infiltrate compatible with developing pneumonia.                                   This report was finalized on 06/02/2019 08:18 by Dr. Julián Garcia MD.    CT Head Without Contrast [[id number]] Collected:  06/02/19 0813     Updated:  06/02/19 0817    Narrative:       EXAMINATION: CT HEAD WO CONTRAST-      6/2/2019 6:08 AM CDT     HISTORY: Stroke; R26.2-Difficulty in walking, not elsewhere classified;  D72.829-Elevated white blood cell count, unspecified     In order to have a CT radiation dose as low as reasonably achievable  Automated Exposure Control was utilized for adjustment of the mA and/or  KV according to patient size.     DLP in mGycm= 540.     Noncontrast head CT compared with 05/31/2027.     A preliminary [facility] report was faxed to the Emergency Department  immediately after this study was interpreted at 7:19 AM.     Axial, sagittal, and coronal noncontrast CT imaging of the head.     The visualized paranasal  sinuses are clear.     The brain and ventricles have an age appropriate appearance.   There is no hemorrhage or mass-effect.   No acute infarction is seen.     No calvarial abnormality.       Impression:       1. No acute intracranial abnormality is seen.                                         This report was finalized on 06/02/2019 08:14 by Dr. Julián Garcia MD.        Chief Complaint on Day of Discharge: Weakness    History of Present Illness on Day of Discharge: Patient is sitting up in the chair with son at bedside.  She reports she feels about the same today, may be a little better.  She would like to go home today.    Hospital Course:  Ms. Snell is a 74-year-old  female who follows [facility] for primary care.  She has a past medical history significant for pulmonary embolus, gastroesophageal reflux disease, and hypertension.  She presented to the [facility] emergency department on 6/2/2019 with complaints of generalized weakness.  Her family also noted that she had been more short of breath than usual.  In the emergency department, troponin was very slightly elevated at 0.11.  BUN was 33 and creatinine 1.07.  D-dimer was elevated at 1.17.  White blood cell count was 18,000.  Analysis was not indicative of infection.  CT of the chest showed a bibasilar infiltrate compatible with developing pneumonia.  The patient was admitted to the hospitalist service for further evaluation and management.    The patient was originally placed on Rocephin and azithromycin for pneumonia.  She did receive 3 days of azithromycin and cephalosporin therapy.  Strep and Legionella antigens were negative.  Blood cultures have remained with no growth to date.  As she had been having complaints of difficulty swallowing and coughing with liquids, it was felt best to change her to Augmentin therapy for anaerobic coverage in the event she had aspirated.  She will need to complete a total of 7 days of  "antibiotic therapy.  She was seen in consultation by speech therapy.  No overt signs or symptoms of aspiration were noted upon their evaluations.  She did go for video fluoroscopy swallowing study.  No definite aspiration noted but she did have penetration with thin and nectar thick consistencies.    The patient's troponin value demonstrated a flat trend.  No further cardiac work-up has been planned at this time as she just recently had a stress echocardiogram in February which was interpreted as low risk for ischemia.    The patient had complaints of pleuritic type chest pain to the left posterior ribs.  There is no bony abnormalities noted on CT scans.  She is been given a tapering dosage of prednisone to assist with this, as well as Ultram and she has been advised to obtain over-the-counter lidocaine patches.    The patient has worked with physical and occupational therapy who have recommended home health services.  She is appropriate for discharge home today.  She will be ordered a hospital bed and bedside commode due to her severe lower extremity edema and impaired mobility.  She will need to follow-up with her primary care provider in 1 week.  As her white blood cell count has remained elevated (may be partially related to steroid therapy), as well as transaminases, we will have home health check a CMP and CBC without differential on Monday with results to her primary care provider.    Condition on Discharge:  Stable    Physical Exam on Discharge:  /53 (BP Location: Left arm, Patient Position: Sitting)   Pulse 77   Temp 97.8 °F (36.6 °C) (Oral)   Resp 16   Ht 170.2 cm (67\")   Wt 112 kg (246 lb 7 oz)   SpO2 99%   BMI 38.60 kg/m²    Physical Exam  Constitutional: She is oriented to person, place, and time. She appears well-developed and well-nourished. No distress.   Obese body habitus   HENT:   Head: Normocephalic and atraumatic.   Neck: Normal range of motion. Neck supple. No JVD present. No " tracheal deviation present.   Cardiovascular: Normal rate, regular rhythm, normal heart sounds and intact distal pulses. Exam reveals no gallop and no friction rub.   Sinus  82-92  Pulmonary/Chest: Effort normal. She has no wheezes. She has no rales.   Diminished breath sounds   Abdominal: Soft. Bowel sounds are normal. She exhibits no distension. There is tenderness- left posterior rib area. There is no guarding.   Musculoskeletal: She exhibits edema (non-pitting moderate edema BLE) and tenderness (BLE).   Neurological: She is alert and oriented to person, place, and time. No cranial nerve deficit.   Skin: Skin is warm and dry. No rash noted. No erythema.   Psychiatric: She has a normal mood and affect. Her behavior is normal. Judgment and thought content normal.   Vitals reviewed    [place] [facility] [place] Medications:     Discharge Medications      New Medications      Instructions Start Date   amoxicillin-clavulanate 875-125 MG per tablet  Commonly known as:  AUGMENTIN   1 tablet, Oral, Every 12 Hours Scheduled      predniSONE 10 MG tablet  Commonly known as:  DELTASONE   Take 2 tablets by mouth Daily for 3 days, THEN 1 tablet Daily for 3 days.   Start Date:  6/7/2019     traMADol 50 MG tablet  Commonly known as:  ULTRAM   50 mg, Oral, Every 6 Hours PRN         Continue These Medications      Instructions Start Date   buPROPion  MG 24 hr tablet  Commonly known as:  WELLBUTRIN XL   300 mg, Oral, Daily      citalopram 20 MG tablet  Commonly known as:  CeleXA   10 mg, Oral, Daily      donepezil 10 MG tablet  Commonly known as:  ARICEPT   10 mg, Oral, Nightly      pantoprazole 40 MG EC tablet  Commonly known as:  PROTONIX   40 mg, Oral, Daily      rivaroxaban 20 MG tablet  Commonly known as:  XARELTO   20 mg, Oral, Daily           Discharge Diet:   Diet Instructions     Diet: Soft Texture; Thin Liquids, No Restrictions; Chopped      Discharge Diet:  Soft Texture    Fluid  Consistency:  Thin Liquids, No Restrictions    Soft Options:  Chopped        Activity at Discharge:   Activity Instructions     Up WIth Assist          Discharge Care Plan/Instructions:   1.  Return for any acute or worsening symptoms  2.  Home health services for skilled nursing, physical/occupational/speech therapies  3.  Durable medical equipment with hospital bed and bedside commode  4.  Home health to draw a CBC without differential and CMP on Monday with results to PCP    Follow-up Appointments:   1.  Primary care provider in 1 week    Test Results Pending at Discharge: None    BJORN Chong  06/07/19  4:14 PM    Time: 45 minutes    Chart reviewed  Patient examined  Agree with assessment and plan  Discussed with family, patient and JUNIOR Weber DO  06/07/19  6:33 PM        Electronically signed by Torrie Weber DO at 6/7/2019  6:33 PM

## 2019-06-11 NOTE — OUTREACH NOTE
COPD/PN Week 1 Survey      Responses   Facility patient discharged from?  [facility]   Does the patient have one of the following disease processes/diagnoses(primary or secondary)?  COPD/Pneumonia   Is there a successful TCM telephone encounter documented?  No   Was the primary reason for admission:  Pneumonia   Week 1 attempt successful?  Yes   Call start time  1515   Call end time  1519   Discharge diagnosis  Pneumonia d/t infectious organism, elevated transaminase level, elevated troponin, long term anticoagulation, leukocytosisi, generalized weakness   Is patient permission given to speak with other caregiver?  Yes   List who call center can speak with  Beulah Ha   Person spoke with today (if not patient) and relationship  Otoniel- Leland   Meds reviewed with patient/caregiver?  Yes   Is the patient having any side effects they believe may be caused by any medication additions or changes?  No   Does the patient have all medications ordered at discharge?  Yes   Is the patient taking all medications as directed (includes completed medication regime)?  Yes   Does the patient have a primary care provider?   Yes   Does the patient have an appointment with their PCP or pulmonologist within 7 days of discharge?  Yes   Has the patient kept scheduled appointments due by today?  Yes   What is the Home health agency?   [facility]   Has home health visited the patient within 72 hours of discharge?  Yes   Psychosocial issues?  No   Did the patient receive a copy of their discharge instructions?  Yes   Nursing interventions  Reviewed instructions with patient   What is the patient's perception of their health status since discharge?  Improving   Nursing Interventions  Nurse provided patient education   Is the patient/caregiver able to teach back the hierarchy of who to call/visit for symptoms/problems? PCP, Specialist, Home health nurse, Urgent Care, ED, 911  Yes   Additional teach back comments  Per son, pt is doing much better, no  questions or concerns at this time.   Is the patient/caregiver able to teach back signs and symptoms of worsening condition:  Fever/chills, Shortness of breath, Chest pain   Is the patient/caregiver able to teach back importance of completing antibiotic course of treatment?  Yes   Week 1 call completed?  Yes          Dolly Costello RN

## 2019-06-18 NOTE — OUTREACH NOTE
COPD/PN Week 2 Survey      Responses   Facility patient discharged from?  [facility]   Does the patient have one of the following disease processes/diagnoses(primary or secondary)?  COPD/Pneumonia   Was the primary reason for admission:  Pneumonia   Week 2 attempt successful?  Yes   Call start time  1004   Call end time  1015   Discharge diagnosis  Pneumonia d/t infectious organism, elevated transaminase level, elevated troponin, long term anticoagulation, leukocytosisi, generalized weakness   Meds reviewed with patient/caregiver?  Yes   Is the patient taking all medications as directed (includes completed medication regime)?  Yes   Does the patient have an appointment with their PCP or pulmonologist within 7 days of discharge?  Yes   Has the patient kept scheduled appointments due by today?  Yes   What is the patient's perception of their health status since discharge?  Improving   Additional teach back comments  Pt's family is taking care of her and very attentive.   Is the patient able to teach back COPD zones?  Yes   Week 2 call completed?  Yes          Hannah Plaza RN

## 2019-06-20 PROBLEM — R06.02 SOB (SHORTNESS OF BREATH): Status: ACTIVE | Noted: 2019-01-01

## 2019-06-20 PROBLEM — R06.09 DYSPNEA ON EXERTION: Status: ACTIVE | Noted: 2019-01-01

## 2019-06-20 PROBLEM — I50.9 CHF (CONGESTIVE HEART FAILURE) (HCC): Status: ACTIVE | Noted: 2019-01-01

## 2019-06-21 NOTE — OUTREACH NOTE
COPD/PN Week 3 Survey      Responses   Facility patient discharged from?  [place]   Does the patient have one of the following disease processes/diagnoses(primary or secondary)?  COPD/Pneumonia   Was the primary reason for admission:  Pneumonia   Week 3 attempt successful?  No   Revoke  Readmitted          Debbie Tsang RN

## 2019-06-24 PROBLEM — E87.6 HYPOKALEMIA: Status: ACTIVE | Noted: 2019-01-01

## 2019-06-24 PROBLEM — I27.20 PULMONARY HYPERTENSION (HCC): Status: ACTIVE | Noted: 2019-01-01

## 2019-06-26 PROBLEM — R77.8 ELEVATED TROPONIN: Status: RESOLVED | Noted: 2019-01-01 | Resolved: 2019-01-01

## 2019-06-26 PROBLEM — Z79.01 CURRENT USE OF LONG TERM ANTICOAGULATION: Status: RESOLVED | Noted: 2019-01-01 | Resolved: 2019-01-01

## 2019-06-26 PROBLEM — E87.6 HYPOKALEMIA: Status: RESOLVED | Noted: 2019-01-01 | Resolved: 2019-01-01

## 2019-08-10 PROBLEM — I26.99 PE (PULMONARY THROMBOEMBOLISM): Status: ACTIVE | Noted: 2019-01-01

## 2019-08-10 PROBLEM — E87.0 HYPERNATREMIA: Status: ACTIVE | Noted: 2019-01-01

## 2019-08-10 PROBLEM — D64.9 SYMPTOMATIC ANEMIA: Status: ACTIVE | Noted: 2019-01-01

## 2019-08-10 PROBLEM — R62.7 FAILURE TO THRIVE IN ADULT: Status: ACTIVE | Noted: 2019-01-01

## 2019-08-10 PROBLEM — I82.409 DVT (DEEP VENOUS THROMBOSIS) (HCC): Status: ACTIVE | Noted: 2019-01-01

## 2019-08-10 PROBLEM — I95.9 HYPOTENSION: Status: ACTIVE | Noted: 2019-01-01

## 2019-08-10 NOTE — ED PROVIDER NOTES
Subjective   Patient is a 74-year-old female who is currently in a nursing home recovering from a pneumonia.  She also has a history of PE and DVT for which she takes Xarelto.  The family brings her in from the nursing home today because of concerns that she is refusing to eat and becoming weaker as time goes on.  The denies fever or other systemic symptoms.  They do not report any black or bloody stools.  There is been no vomiting.  The patient herself has no acute complaints            Review of Systems   Constitutional: Positive for appetite change and fatigue.       Past Medical History:   Diagnosis Date   • Arthritis    • Colon polyp    • DVT (deep venous thrombosis) (CMS/HCC)    • GERD (gastroesophageal reflux disease)    • Hypertension    • PE (pulmonary thromboembolism) (CMS/HCC)    • Sleep apnea        No Known Allergies    Past Surgical History:   Procedure Laterality Date   • CHOLECYSTECTOMY     • COLONOSCOPY  09/24/2013   • COLONOSCOPY N/A 8/10/2017    Procedure: COLONOSCOPY WITH ANESTHESIA;  Surgeon: Jenna Page MD;  Location: [facility] ENDOSCOPY;  Service:    • ENDOSCOPY N/A 8/10/2017    Procedure: ESOPHAGOGASTRODUODENOSCOPY WITH ANESTHESIA;  Surgeon: Jenna Page MD;  Location: [facility] ENDOSCOPY;  Service:    • JOINT REPLACEMENT         Family History   Problem Relation Age of Onset   • No Known Problems Mother    • No Known Problems Father    • No Known Problems Sister    • No Known Problems Brother    • No Known Problems Daughter    • No Known Problems Son    • No Known Problems Maternal Grandmother    • No Known Problems Paternal Grandmother    • No Known Problems Maternal Aunt    • No Known Problems Paternal Aunt    • Breast cancer Neg Hx    • Colon cancer Neg Hx    • Colon polyps Neg Hx    • BRCA 1/2 Neg Hx    • Endometrial cancer Neg Hx    • Ovarian cancer Neg Hx        Social History     Socioeconomic History   • Marital status: Single     Spouse name: Not on file   • Number of children: Not  on file   • Years of education: Not on file   • Highest education level: Not on file   Tobacco Use   • Smoking status: Former Smoker   • Smokeless tobacco: Never Used   Substance and Sexual Activity   • Alcohol use: No   • Drug use: No   • Sexual activity: Defer           Objective   Physical Exam   Constitutional: She is oriented to person, place, and time. She appears well-developed and well-nourished.   HENT:   Head: Normocephalic and atraumatic.   Mouth/Throat: Oropharynx is clear and moist.   Eyes: Conjunctivae and EOM are normal.   Neck: Normal range of motion. Neck supple.   Cardiovascular: Normal rate, regular rhythm, normal heart sounds and intact distal pulses.   Pulmonary/Chest: Effort normal and breath sounds normal.   Abdominal: Soft. Bowel sounds are normal.   Musculoskeletal: Normal range of motion. She exhibits edema.   Neurological: She is alert and oriented to person, place, and time.   Skin: Skin is warm and dry. Capillary refill takes less than 2 seconds.   Has decubitus ulcers in the sacrum and heel grade 2 per nursing evaluation.   Psychiatric: Her speech is normal. Judgment and thought content normal. She is withdrawn.   Nursing note and vitals reviewed.      Procedures           ED Course                  MDM  Number of Diagnoses or Management Options     Amount and/or Complexity of Data Reviewed  Clinical lab tests: reviewed and ordered  Tests in the radiology section of CPT®: reviewed and ordered  Decide to obtain previous medical records or to obtain history from someone other than the patient: yes    Critical Care  Total time providing critical care: < 30 minutes    Patient Progress  Patient progress: stable    The patient has numerous lab values which are slightly abnormal but nothing dramatic except for a significant drop in hemoglobin.  2 days ago her hemoglobin was 10.5 and today is 8.3.  My rectal exam did not show a positive occult blood test and is been no report of black stools or  blood in her stools.  She does appear to have symptomatic anemia though as she is more fatigued with this lower hemoglobin.  Will admit for transfusion and observation to make sure that is not an emerging major GI bleed.    Final diagnoses:   Symptomatic anemia            Antonio Valladares MD  08/10/19 [id number]

## 2019-08-10 NOTE — PLAN OF CARE
Problem: Patient Care Overview  Goal: Plan of Care Review  Outcome: Ongoing (interventions implemented as appropriate)  Patient admitted with symptomatic anemia. HGB 8.3 HCT 26.6. Order for 1uPRBC. PICC line to [facility]. Pt came from another facility with. Spoke with vascular access. OK to keep line - change dressing. Incontinence, ground diet, alert/oriented. Pt states she is very weak. Unable to hold arms up or raise legs. From nursing home & gets up with assistance to chair. Wounds noted to coccyx & posterior upper thighs. Pictures taken. Family at bedside. Continue to monitor    08/10/19 1842   Coping/Psychosocial   Plan of Care Reviewed With patient;son   Plan of Care Review   Progress no change     Goal: Individualization and Mutuality  Outcome: Ongoing (interventions implemented as appropriate)    Goal: Discharge Needs Assessment  Outcome: Ongoing (interventions implemented as appropriate)   08/10/19 1842   Discharge Needs Assessment   Readmission Within the Last 30 Days no previous admission in last 30 days   Concerns to be Addressed no discharge needs identified   Patient/Family Anticipates Transition to inpatient rehabilitation facility;long term care facility   Patient/Family Anticipated Services at Transition    Transportation Concerns public transportation, does not know how to access   Transportation Anticipated health plan transportation;public transportation   Anticipated Changes Related to Illness inability to care for self     Goal: Interprofessional Rounds/Family Conf  Outcome: Ongoing (interventions implemented as appropriate)   08/10/19 1842   Interdisciplinary Rounds/Family Conf   Participants family;nursing;patient;physician       Problem: Fall Risk (Adult)  Goal: Identify Related Risk Factors and Signs and Symptoms  Outcome: Ongoing (interventions implemented as appropriate)   08/10/19 1842   Fall Risk (Adult)   Related Risk Factors (Fall Risk) age-related changes     Goal: Absence of  Fall  Outcome: Ongoing (interventions implemented as appropriate)   08/10/19 1842   Fall Risk (Adult)   Absence of Fall making progress toward outcome       Problem: Skin Injury Risk (Adult)  Goal: Identify Related Risk Factors and Signs and Symptoms  Outcome: Ongoing (interventions implemented as appropriate)   08/10/19 1842   Skin Injury Risk (Adult)   Related Risk Factors (Skin Injury Risk) advanced age;body weight extremes;cognitive impairment;mobility impaired;moisture     Goal: Skin Health and Integrity  Outcome: Ongoing (interventions implemented as appropriate)      Problem: Anemia (Adult)  Goal: Identify Related Risk Factors and Signs and Symptoms  Outcome: Ongoing (interventions implemented as appropriate)    Goal: Symptom Improvement  Outcome: Ongoing (interventions implemented as appropriate)   08/10/19 1842   Anemia (Adult)   Symptom Improvement making progress toward outcome

## 2019-08-10 NOTE — H&P
[facility] Medicine Services  HISTORY AND PHYSICAL    Date of Admission: 8/10/2019  Primary Care Physician: Eamon Elizalde APRN    Subjective     Chief Complaint: Weakness.    History of Present Illness  Patient is a 74-year-old presented to ER with complaining of generalized weakness.  Patient's is from a nursing home recovering from pneumonia and weakness.  Patient has chronic history of DVT/PE on chronic Xarelto.  Patient has not been eating well and becoming weaker since she went to the nursing home in June 2019.  Patient denies any nausea vomiting or diarrhea.  Patient denies any bright red blood or dark stool.  Patient denies any fever night sweats chills.    Patient been mostly bedridden since since discharge in June 2019 for pneumonia.  Discussed with Family.    Past medical history arthritis, colon polyp, DVT, reflux, hypertension, pulmonary embolus, sleep apnea.     Review of Systems   Constitutional: Positive for activity change, appetite change and fatigue. Negative for chills and fever.   HENT: Negative for hearing loss, nosebleeds, tinnitus and trouble swallowing.    Eyes: Negative for visual disturbance.   Respiratory: Negative for cough, chest tightness, shortness of breath and wheezing.    Cardiovascular: Negative for chest pain, palpitations and leg swelling.   Gastrointestinal: Negative for abdominal distention, abdominal pain, blood in stool, constipation, diarrhea, nausea and vomiting.   Endocrine: Negative for cold intolerance, heat intolerance, polydipsia, polyphagia and polyuria.   Genitourinary: Negative for decreased urine volume, difficulty urinating, dysuria, flank pain, frequency and hematuria.   Musculoskeletal: Positive for arthralgias, gait problem and myalgias. Negative for joint swelling.   Skin: Negative for rash.   Allergic/Immunologic: Negative for immunocompromised state.   Neurological: Negative for dizziness, syncope, weakness,  light-headedness and headaches.   Hematological: Negative for adenopathy. Does not bruise/bleed easily.   Psychiatric/Behavioral: Positive for confusion. Negative for sleep disturbance. The patient is not nervous/anxious.         Otherwise complete ROS reviewed and negative except as mentioned in the HPI.    Past Medical History:   Past Medical History:   Diagnosis Date   • Arthritis    • Colon polyp    • DVT (deep venous thrombosis) (CMS/Formerly [facility] [place])    • GERD (gastroesophageal reflux disease)    • Hypertension    • PE (pulmonary thromboembolism) (CMS/Formerly [facility] [place])    • Sleep apnea        Past Surgical History:  Past Surgical History:   Procedure Laterality Date   • CHOLECYSTECTOMY     • COLONOSCOPY  09/24/2013   • COLONOSCOPY N/A 8/10/2017    Procedure: COLONOSCOPY WITH ANESTHESIA;  Surgeon: Jenna Page MD;  Location: [facility] ENDOSCOPY;  Service:    • ENDOSCOPY N/A 8/10/2017    Procedure: ESOPHAGOGASTRODUODENOSCOPY WITH ANESTHESIA;  Surgeon: Jenna Page MD;  Location: [facility] ENDOSCOPY;  Service:    • JOINT REPLACEMENT         Family History: family history includes No Known Problems in her brother, daughter, father, maternal aunt, maternal grandmother, mother, paternal aunt, paternal grandmother, sister, and son.    Social History:  reports that she has quit smoking. She has never used smokeless tobacco. She reports that she does not drink alcohol or use drugs.    Medications:  Prior to Admission medications    Medication Sig Start Date End Date Taking? Authorizing Provider   aspirin 81 MG EC tablet Take 1 tablet by mouth Daily. 6/27/19  Yes Todd Espana MD   bumetanide (BUMEX) 1 MG tablet Take 1 tablet by mouth 2 (Two) Times a Day. 6/26/19  Yes Todd Espana MD   buPROPion XL (WELLBUTRIN XL) 150 MG 24 hr tablet Take 150 mg by mouth Daily.   Yes Provider, MD Marixa   cefTRIAXone (ROCEPHIN) 1 g injection Inject 1 g into the appropriate muscle as directed by prescriber Daily. 8/11/19 8/16/19 Yes  "ProviderMarixa MD   citalopram (CeleXA) 10 MG tablet Take 10 mg by mouth Daily.   Yes Marixa Jansen MD   cyproheptadine (PERIACTIN) 2 MG half tablet Take 2 mg by mouth 4 (Four) Times a Day. 8/6/19 8/13/19 Yes Marixa Jansen MD   cyproheptadine (PERIACTIN) 4 MG tablet Take 4 mg by mouth 4 (Four) Times a Day. 8/14/19  Yes Marixa Jansen MD   diphenhydrAMINE (BENADRYL) 25 MG tablet Take 25 mg by mouth Every 6 (Six) Hours As Needed for Itching (psoritis). 8/6/19 8/20/19 Yes Marixa Jansen MD   donepezil (ARICEPT) 10 MG tablet Take 10 mg by mouth Daily.   Yes Marixa Jansen MD   Infant Foods (SIMILAC LIQUID PROTEIN) concentration Take  by mouth.   Yes Marixa Jansen MD   mirtazapine (REMERON) 7.5 MG half tablet Take 15 mg by mouth Every Night. 8/6/19 8/11/19 Yes Marixa Jansen MD   Multiple Vitamins-Minerals (MULTIVITAMIN WITH MINERALS) tablet tablet Take 1 tablet by mouth Daily.   Yes Marixa Jansen MD   pantoprazole (PROTONIX) 40 MG EC tablet Take 40 mg by mouth Daily.   Yes Marixa Jansen MD   rivaroxaban (XARELTO) 20 MG tablet Take 20 mg by mouth Daily.   Yes ProviderMarixa MD   traMADol (ULTRAM) 50 MG tablet Take 1 tablet by mouth Every 6 (Six) Hours As Needed for Moderate Pain . 6/26/19  Yes Todd Espana MD     Allergies:  No Known Allergies    Objective     Vital Signs: /45   Pulse 81   Temp 98.3 °F (36.8 °C) (Oral)   Resp 16   Ht 165.1 cm (65\")   Wt 102 kg (225 lb)   SpO2 95%   BMI 37.44 kg/m²   Physical Exam   Constitutional: She appears well-developed.   HENT:   Head: Normocephalic and atraumatic.   Eyes: Conjunctivae are normal.   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   Neck: Neck supple. No JVD present. No thyromegaly present.   Cardiovascular: Normal rate, regular rhythm, normal heart sounds and intact distal pulses. Exam reveals no gallop and no friction rub.   No murmur " heard.  Pulmonary/Chest: Effort normal and breath sounds normal. No respiratory distress. She has no wheezes. She has no rales. She exhibits no tenderness.   Decrease in breath sound bilateral, clear.   Abdominal: Soft. Bowel sounds are normal. She exhibits no distension. There is no tenderness. There is no rebound and no guarding.   Musculoskeletal: She exhibits edema. She exhibits no tenderness or deformity.   +2-3 pitting edema bilateral lower extremities.  Chronic condition.   Lymphadenopathy:     She has no cervical adenopathy.   Neurological: She is alert. She displays normal reflexes. No cranial nerve deficit. She exhibits abnormal muscle tone. Coordination abnormal.   Skin: Skin is warm and dry. No rash noted.   Psychiatric: She has a normal mood and affect. Her behavior is normal.   Nursing note and vitals reviewed.          Results Reviewed:    Lab Results (last 24 hours)     Procedure Component Value Units Date/Time    Folate [[id number]] Collected:  08/10/19 1216    Specimen:  Blood Updated:  08/10/19 1633    Vitamin B12 [[id number]] Collected:  08/10/19 1216    Specimen:  Blood Updated:  08/10/19 1633    Iron Profile [[id number]] Collected:  08/10/19 1216    Specimen:  Blood Updated:  08/10/19 1633    Ferritin [[id number]] Collected:  08/10/19 1216    Specimen:  Blood Updated:  08/10/19 1633    POC Occult Blood Stool [[id number]]  (Normal) Collected:  08/10/19 1446    Specimen:  Stool Updated:  08/10/19 1447     Fecal Occult Blood Negative     Lot Number [id number]     Expiration Date 12/31/2019     DEVELOPER LOT NUMBER 147     DEVELOPER EXPIRATION DATE 12/31/2019     Positive Control Positive     Negative Control Negative    Urinalysis With Culture If Indicated - Urine, Catheter In/Out [[id number]]  (Normal) Collected:  08/10/19 1318    Specimen:  Urine, Catheter In/Out Updated:  08/10/19 1334     Color, UA Yellow     Appearance, UA Clear     pH, UA 6.5     Specific Gravity, UA 1.012     Glucose, UA Negative      Ketones, UA Negative     Bilirubin, UA Negative     Blood, UA Negative     Protein, UA Negative     Leuk Esterase, UA Negative     Nitrite, UA Negative     Urobilinogen, UA 0.2 E.U./dL    Narrative:       Urine microscopic not indicated.    Chromo Draw [[id number]] Collected:  08/10/19 1216    Specimen:  Blood Updated:  08/10/19 1331    Narrative:       The following orders were created for panel order Chromo Draw.  Procedure                               Abnormality         Status                     ---------                               -----------         ------                     Light Blue Top[[id number]]                                   Final result               Green Top (Gel)[[id number]]                                  Final result               Lavender Top[[id number]]                                     Final result               Red Top[[id number]]                                          Final result                 Please view results for these tests on the individual orders.    Light Blue Top [[id number]] Collected:  08/10/19 1216    Specimen:  Blood Updated:  08/10/19 1331     Extra Tube hold for add-on     Comment: Auto resulted       Green Top (Gel) [[id number]] Collected:  08/10/19 1216    Specimen:  Blood Updated:  08/10/19 1331     Extra Tube Hold for add-ons.     Comment: Auto resulted.       Lavender Top [[id number]] Collected:  08/10/19 1216    Specimen:  Blood Updated:  08/10/19 1331     Extra Tube hold for add-on     Comment: Auto resulted       Red Top [[id number]] Collected:  08/10/19 1216    Specimen:  Blood Updated:  08/10/19 1331     Extra Tube Hold for add-ons.     Comment: Auto resulted.       Comprehensive Metabolic Panel [[id number]]  (Abnormal) Collected:  08/10/19 1216    Specimen:  Blood Updated:  08/10/19 1242     Glucose 73 mg/dL      BUN 53 mg/dL      Creatinine 1.68 mg/dL      Sodium 147 mmol/L      Potassium 3.4 mmol/L      Chloride 115 mmol/L      CO2 32.0 mmol/L      Calcium  6.8 mg/dL      Total Protein 4.8 g/dL      Albumin 1.70 g/dL      ALT (SGPT) 64 U/L      AST (SGOT) 77 U/L      Alkaline Phosphatase 142 U/L      Total Bilirubin 0.4 mg/dL      eGFR  Jeri Saenz 36 mL/min/1.73      Globulin 3.1 gm/dL      A/G Ratio 0.5 g/dL      BUN/Creatinine Ratio 31.5     Anion Gap 0.0 mmol/L     Narrative:       GFR Normal >60  Chronic Kidney Disease <60  Kidney Failure <15    CBC & Differential [[id number]] Collected:  08/10/19 1216    Specimen:  Blood Updated:  08/10/19 1235    Narrative:       The following orders were created for panel order CBC & Differential.  Procedure                               Abnormality         Status                     ---------                               -----------         ------                     CBC Auto Differential[[id number]]        Abnormal            Final result                 Please view results for these tests on the individual orders.    CBC Auto Differential [[id number]]  (Abnormal) Collected:  08/10/19 1216    Specimen:  Blood Updated:  08/10/19 1235     WBC 11.20 10*3/mm3      RBC 3.20 10*6/mm3      Hemoglobin 8.3 g/dL      Hematocrit 26.6 %      MCV 83.1 fL      MCH 25.9 pg      MCHC 31.2 g/dL      RDW 18.8 %      RDW-SD 56.3 fl      MPV 10.4 fL      Platelets 220 10*3/mm3      Neutrophil % 46.1 %      Lymphocyte % 30.6 %      Monocyte % 7.8 %      Eosinophil % 11.5 %      Basophil % 0.3 %      Immature Grans % 3.7 %      Neutrophils, Absolute 5.17 10*3/mm3      Lymphocytes, Absolute 3.43 10*3/mm3      Monocytes, Absolute 0.87 10*3/mm3      Eosinophils, Absolute 1.29 10*3/mm3      Basophils, Absolute 0.03 10*3/mm3      Immature Grans, Absolute 0.41 10*3/mm3      nRBC 0.0 /100 WBC            Radiology Data:    Imaging Results (last 24 hours)     Procedure Component Value Units Date/Time    XR Chest 1 View [[id number]] Collected:  08/10/19 1245     Updated:  08/10/19 1248    Narrative:       XR CHEST 1 VW- 8/10/2019 12:43 PM CDT     HISTORY: AMS        COMPARISON: 06/20/2019.     FINDINGS:   The patient's PICC line extends into the SVC. The lungs are  hypoinflated. There is prominence of the pulmonary arteries, which is  likely due to hypoinflation. The cardiac silhouette is stable.      The osseous structures and surrounding soft tissues demonstrate no acute  abnormality.       Impression:       1. Low lung volumes and crowding of the bronchovascular structures.        This report was finalized on 08/10/2019 12:45 by Dr. Harley Madison MD.          I have personally reviewed and interpreted the radiology studies and ECG obtained at time of admission.     Assessment / Plan      Assessment & Plan  Active Hospital Problems    Diagnosis   • Symptomatic anemia   • Failure to thrive in adult   • DVT (deep venous thrombosis) (CMS/HCC)   • PE (pulmonary thromboembolism) (CMS/HCC)   • Hypotension   • Hypernatremia   • Hypokalemia   • CHF (congestive heart failure) (CMS/HCC)     Discussed with Dr. Hui; diastolic congestive heart failure       Plan.  Aymptomatic anemia.  Hemoglobin on 6/25/2019 was 9.2.  Today hemoglobin is 8.3.  Hemoccult was negative.  Plan for 1 unit transfusion through the ER.  Anemia profile.  Fluctuation of hemoglobin probably secondary to Bumex.  No sign of acute bleed.      History of CHF/history of hypertension/pulmonary hypertension  Echocardiogram 6/21/2019- ejection fraction 56%, trace mitral valve regurgitation, trace tricuspid valve regurgitation-right ventricular systolic pressure moderately elevated.  Hold all blood pressure medication and Bumex due to hypotension.  Continue aspirin.  Hold Bumex.    History DVT/pulmonary embolus.  Continue Xarelto.  Chest x-ray-low lung volumes and crowding of the bronchovascular structures.    Hypotension.  Hold blood pressure medication.  IV fluids for now.    Hypernatremia.  Electrolyte with half-normal saline 20 KCl.  Banana bag with free water.    Depression.  Hold Wellbutrin.  Continue Celexa.   Remeron at night.    Hypokalemia. Replace with half normal saline 20 KCl.    Chronic renal failure.  Slow IV hydration.    History of reflux/hiatal hernia.  Hold Aricept due to GI side effects.  Protonix daily.  Zofran as needed.    Leukocytosis.  Will follow.    Chronic pain.  Hold Ultram till patient is more alert.    Deconditioning/generalized weakness.  PT and OT consult.    Nutrition.  Speech evaluation.  Banana bags.     Code Status: DNI. DNR.      I discussed the patient's findings and my recommendations with: Patient and son.    Estimated length of stay: 1 to 4 days.     Rob Bundy MD   08/10/19   4:51 PM

## 2019-08-11 NOTE — THERAPY EVALUATION
Acute Care - Occupational Therapy Initial Evaluation  [facility]     Patient Name: Karen Senll  : 1945  MRN: [id number]  Today's Date: 2019  Onset of Illness/Injury or Date of Surgery: 08/10/19  Date of Referral to OT: 19  Referring Physician: Dr. Bundy    Admit Date: 8/10/2019       ICD-10-CM ICD-9-CM   1. Symptomatic anemia D64.9 285.9   2. Dysphagia, unspecified type R13.10 787.20   3. Decreased activities of daily living (ADL) R68.89 780.99     Patient Active Problem List   Diagnosis   • Colon polyps   • Epigastric pain   • History of DVT in adulthood   • Altered mental status   • Hernia, umbilical   • Abnormal finding on imaging   • Inability to walk   • Leukocytosis   • Pneumonia due to infectious organism   • Generalized weakness   • Transaminitis   • CHF (congestive heart failure) (CMS/HCC)   • SOB (shortness of breath)   • Dyspnea on exertion   • Pulmonary hypertension (CMS/HCC)   • Hypokalemia   • Symptomatic anemia   • Failure to thrive in adult   • DVT (deep venous thrombosis) (CMS/HCC)   • PE (pulmonary thromboembolism) (CMS/HCC)   • Hypotension   • Hypernatremia     Past Medical History:   Diagnosis Date   • Arthritis    • Colon polyp    • DVT (deep venous thrombosis) (CMS/HCC)    • GERD (gastroesophageal reflux disease)    • Hypertension    • PE (pulmonary thromboembolism) (CMS/HCC)    • Sleep apnea      Past Surgical History:   Procedure Laterality Date   • CHOLECYSTECTOMY     • COLONOSCOPY  2013   • COLONOSCOPY N/A 8/10/2017    Procedure: COLONOSCOPY WITH ANESTHESIA;  Surgeon: Jenna Page MD;  Location: [facility] ENDOSCOPY;  Service:    • ENDOSCOPY N/A 8/10/2017    Procedure: ESOPHAGOGASTRODUODENOSCOPY WITH ANESTHESIA;  Surgeon: Jenna Page MD;  Location: [facility] ENDOSCOPY;  Service:    • JOINT REPLACEMENT            OT ASSESSMENT FLOWSHEET (last 12 hours)      Occupational Therapy Evaluation     Row Name 19 1400                   OT Evaluation Time/Intention     Subjective Information  complains of;weakness;fatigue  -AC        Document Type  evaluation  -AC        Mode of Treatment  occupational therapy  -AC        Patient Effort  adequate  -AC           General Information    Patient Profile Reviewed?  yes  -AC        Onset of Illness/Injury or Date of Surgery  08/10/19  -        Referring Physician  Dr. Bundy  -        Patient Observations  cooperative;agree to therapy;lethargic  -AC        Patient/Family Observations  daughter in law present  -AC        General Observations of Patient  lying supine in bed, IV  -AC        Prior Level of Function  independent:;all household mobility;community mobility;gait;transfer;ADL's  -AC        Equipment Currently Used at Home  grab bar;shower chair;rollator;commode, bedside;power chair, (recliner lift);wheelchair  -AC        Pertinent History of Current Functional Problem  progressive weakness, unable to walk, refusing to eat; Dx: CHT, hypokalemia, symptomatic anemia, FTT  -AC        Existing Precautions/Restrictions  fall  -AC        Risks Reviewed  patient and family:;LOB;nausea/vomiting;dizziness;increased discomfort;change in vital signs;lines disloged  -AC        Benefits Reviewed  patient and family:;improve function;increase independence;increase strength;increase balance;decrease pain;decrease risk of DVT;improve skin integrity;increase knowledge  -AC        Barriers to Rehab  medically complex;previous functional deficit  -AC           Relationship/Environment    Lives With  facility resident  -AC           Resource/Environmental Concerns    Current Living Arrangements  extended care facility  -           Cognitive Assessment/Interventions    Additional Documentation  Cognitive Assessment/Intervention (Group)  -           Cognitive Assessment/Intervention- PT/OT    Orientation Status (Cognition)  oriented x 4  -AC        Follows Commands (Cognition)  WNL  -AC        Personal Safety Interventions  fall prevention  program maintained;gait belt;muscle strengthening facilitated;nonskid shoes/slippers when out of bed;supervised activity  -           Safety Issues, Functional Mobility    Impairments Affecting Function (Mobility)  balance;endurance/activity tolerance;pain;strength  -AC           Bed Mobility Assessment/Treatment    Bed Mobility Assessment/Treatment  scooting/bridging;supine-sit;sit-supine  -AC        Scooting/Bridging [place] (Bed Mobility)  maximum assist (25% patient effort);2 person assist  -        Supine-Sit [place] (Bed Mobility)  maximum assist (25% patient effort)  -        Sit-Supine [place] (Bed Mobility)  maximum assist (25% patient effort)  -        Assistive Device (Bed Mobility)  bed rails;draw sheet;head of bed elevated  -           Functional Mobility    Functional Mobility- Ind. Level  unable to perform  -           ADL Assessment/Intervention    BADL Assessment/Intervention  lower body dressing  -           Lower Body Dressing Assessment/Training    Lower Body Dressing [place] Level  don;doff;socks;maximum assist (25% patient effort)  -        Lower Body Dressing Position  edge of bed sitting  -           BADL Safety/Performance    Impairments, BADL Safety/Performance  balance;endurance/activity tolerance;strength  -AC           General ROM    GENERAL ROM COMMENTS  WFL AAROM BUE  -           MMT (Manual Muscle Testing)    General MMT Comments  3-/5 B shoulders, 4-/5 biceps/triceps  -           Motor Assessment/Interventions    Additional Documentation  Balance (Group)  -           Balance    Balance  static sitting balance  -           Static Sitting Balance    Level of [place] (Unsupported Sitting, Static Balance)  standby assist  -        Sitting Position (Unsupported Sitting, Static Balance)  sitting on edge of bed  -        Time Able to Maintain Position (Unsupported Sitting, Static Balance)  2 to 3 minutes  -           Sensory  Assessment/Intervention    Sensory General Assessment  no sensation deficits identified  -AC           Positioning and Restraints    Pre-Treatment Position  in bed  -AC        Post Treatment Position  bed  -AC        In Bed  side lying left;call light within reach;encouraged to call for assist;exit alarm on;with family/caregiver;side rails up x2;pillow between legs  -AC           Pain Assessment    Additional Documentation  Pain Scale: Numbers Pre/Post-Treatment (Group)  -AC           Pain Scale: Numbers Pre/Post-Treatment    Pain Scale: Numbers, Pretreatment  0/10 - no pain  -AC        Pain Scale: Numbers, Post-Treatment  0/10 - no pain  -AC           Wound 08/10/19 1320 Bilateral coccyx    Wound - Properties Group Date first assessed: 08/10/19  -NS Time first assessed: 1320  -NS Side: Bilateral  -NS Location: coccyx  -NS Stage, Pressure Injury: Stage 2  -NS       Wound 08/10/19 1320 Left foot    Wound - Properties Group Date first assessed: 08/10/19  -NS Time first assessed: 1320  -NS Side: Left  -NS Location: foot  -NS Stage, Pressure Injury: unstageable;deep tissue injury  -NS       Wound 08/10/19 1730 Right posterior;upper thigh MASD ([place] associated skin damage)    Wound - Properties Group Date first assessed: 08/10/19  -MC Time first assessed: 1730  -MC Present on Hospital Admission: Y  -MC Side: Right  -MC Orientation: posterior;upper  -MC Location: thigh  -MC Primary Wound Type: MASD  -MC       Wound 08/10/19 1730 Left upper;posterior thigh MASD ([place] associated skin damage)    Wound - Properties Group Date first assessed: 08/10/19  - Time first assessed: 1730  -MC Present on Hospital Admission: Y  -MC Side: Left  -MC Orientation: upper;posterior  -MC Location: thigh  -MC Primary Wound Type: MASD  -MC       Plan of Care Review    Plan of Care Reviewed With  patient  -AC           Clinical Impression (OT)    Date of Referral to OT  08/11/19  -        OT Diagnosis  decreased adl  -AC         Prognosis (OT Eval)  fair  -AC        Criteria for Skilled Therapeutic Interventions Met (OT Eval)  yes;treatment indicated  -AC        Rehab Potential (OT Eval)  good, to achieve stated therapy goals  -AC        Therapy Frequency (OT Eval)  daily  -AC        Predicted Duration of Therapy Intervention (Therapy Eval)  10 days  -AC        Care Plan Review (OT)  evaluation/treatment results reviewed;care plan/treatment goals reviewed;risks/benefits reviewed;current/potential barriers reviewed;patient/other agree to care plan  -AC        Anticipated Discharge Disposition (OT)  skilled nursing facility  -AC           Vital Signs    Pre Systolic BP Rehab  102  -AC        Pre Treatment Diastolic BP  41  -AC        Pre Patient Position  Supine  -AC           Planned OT Interventions    Planned Therapy Interventions (OT Eval)  activity tolerance training;BADL retraining;edema control/reduction;functional balance retraining;occupation/activity based interventions;patient/caregiver education/training;strengthening exercise;transfer/mobility retraining  -AC           OT Goals    Transfer Goal Selection (OT)  transfer, OT goal 1  -AC        Dressing Goal Selection (OT)  dressing, OT goal 1  -AC        Strength Goal Selection (OT)  strength, OT goal 1  -AC        Additional Documentation  Strength Goal Selection (OT) (Row)  -AC           Transfer Goal 1 (OT)    Activity/Assistive Device (Transfer Goal 1, OT)  bed-to-chair/chair-to-bed;toilet;walker, rolling  -AC        [place] Level/Cues Needed (Transfer Goal 1, OT)  minimum assist (75% or more patient effort)  -AC        Time Frame (Transfer Goal 1, OT)  long term goal (LTG);10 days  -AC        Progress/Outcome (Transfer Goal 1, OT)  goal ongoing  -AC           Dressing Goal 1 (OT)    Activity/Assistive Device (Dressing Goal 1, OT)  dressing skills, all  -AC        [place]/Cues Needed (Dressing Goal 1, OT)  minimum assist (75% or more patient effort)  -AC        Time  Frame (Dressing Goal 1, OT)  long term goal (LTG);10 days  -AC        Progress/Outcome (Dressing Goal 1, OT)  goal ongoing  -AC           Strength Goal 1 (OT)    Strength Goal 1 (OT)  pt will increse BUE strength by 1 muscle grade to increase independence with ADL  -AC        Time Frame (Strength Goal 1, OT)  long term goal (LTG);10 days  -AC        Progress/Outcome (Strength Goal 1, OT)  goal ongoing  -AC          User Key  (r) = Recorded By, (t) = Taken By, (c) = Cosigned By    Initials Name Effective Dates     Nick Newsome, OTR/L, CNT 04/09/19 -     Antonio Polanco RN 08/02/16 -     Jazzy Fay RN 08/02/16 -          Occupational Therapy Education     Title: PT OT SLP Therapies (Done)     Topic: Occupational Therapy (Done)     Point: ADL training (Done)     Description: Instruct learner(s) on proper safety adaptation and remediation techniques during self care or transfers.   Instruct in proper use of assistive devices.    Learning Progress Summary           Patient Acceptance, E, VU by  at 8/11/2019  3:24 PM    Comment:  OT POC, benefits of activity   Family Acceptance, E, VU by  at 8/11/2019  3:24 PM    Comment:  OT POC, benefits of activity                   Point: Home exercise program (Done)     Description: Instruct learner(s) on appropriate technique for monitoring, assisting and/or progressing therapeutic exercises/activities.    Learning Progress Summary           Patient Acceptance, E, VU by  at 8/11/2019  3:24 PM    Comment:  OT POC, benefits of activity   Family Acceptance, E, VU by  at 8/11/2019  3:24 PM    Comment:  OT POC, benefits of activity                               User Key     Initials Effective Dates Name Provider Type Discipline     04/09/19 -  Nick Newsome, OTR/L, CNT Occupational Therapist OT                  OT Recommendation and Plan  Outcome Summary/Treatment Plan (OT)  Anticipated Discharge Disposition (OT): skilled nursing facility  Planned  Therapy Interventions (OT Eval): activity tolerance training, BADL retraining, edema control/reduction, functional balance retraining, occupation/activity based interventions, patient/caregiver education/training, strengthening exercise, transfer/mobility retraining  Therapy Frequency (OT Eval): daily  Plan of Care Review  Plan of Care Reviewed With: patient  Plan of Care Reviewed With: patient  Outcome Summary: OT eval completed.  Pt familiar to this OT. Significantly weaker than last admission approx 1 month ago. Pt requires maxA for bed mobility.  Unable to stand. Profound weakness in BUE 3-/5 in B shoulders, 4-/5 in biceps/triceps.  SBA for balance at EOB.  MaxA for all ADL due to weakness.  She would benefit from skilled OT to increase her independence with ADL focusing on strengthening, balance, and safety.  She has multiple areas of skin breakdown that are new since her last admission and would benefit from therapy to promote skin integrity.  SNF placement at discharge will be needed.    Outcome Measures     Row Name 08/11/19 2672             How much help from another is currently needed...    Putting on and taking off regular lower body clothing?  1  -AC      Bathing (including washing, rinsing, and drying)  1  -AC      Toileting (which includes using toilet bed pan or urinal)  1  -AC      Putting on and taking off regular upper body clothing  2  -AC      Taking care of personal grooming (such as brushing teeth)  2  -AC      Eating meals  2  -AC      AM-PAC 6 Clicks Score (OT)  9  -AC         Functional Assessment    Outcome Measure Options  AM-PAC 6 Clicks Daily Activity (OT)  -AC        User Key  (r) = Recorded By, (t) = Taken By, (c) = Cosigned By    Initials Name Provider Type    Nick Abdi, OTR/L, CNT Occupational Therapist          Time Calculation:   Time Calculation- OT     Row Name 08/11/19 9554             Time Calculation- OT    OT Start Time  1445  -AC      OT Stop Time  1527  -AC       OT Time Calculation (min)  42 min  -      OT Received On  08/11/19  -      OT Goal Re-Cert Due Date  08/21/19  -        User Key  (r) = Recorded By, (t) = Taken By, (c) = Cosigned By    Initials Name Provider Type    Nick Abdi OTR/L, GRADY Occupational Therapist        Therapy Charges for Today     Code Description Service Date Service Provider Modifiers Qty    65066592841 HC OT EVAL HIGH COMPLEXITY 3 8/11/2019 Nick Newsome OTR/L, GRADY GO 1               ROBERT Wade/L, CNT  8/11/2019

## 2019-08-11 NOTE — PLAN OF CARE
Problem: Patient Care Overview  Goal: Plan of Care Review  Outcome: Ongoing (interventions implemented as appropriate)   08/11/19 9687   Coping/Psychosocial   Plan of Care Reviewed With patient   Plan of Care Review   Progress improving   OTHER   Outcome Summary Hemoglobin improved. Safety maintained, PT/OT ordered. No complaints of pain. Reposition q 2 hours, no new skin breakdown noted.     Goal: Individualization and Mutuality  Outcome: Ongoing (interventions implemented as appropriate)    Goal: Discharge Needs Assessment  Outcome: Ongoing (interventions implemented as appropriate)    Goal: Interprofessional Rounds/Family Conf  Outcome: Ongoing (interventions implemented as appropriate)      Problem: Fall Risk (Adult)  Goal: Identify Related Risk Factors and Signs and Symptoms  Outcome: Ongoing (interventions implemented as appropriate)    Goal: Absence of Fall  Outcome: Ongoing (interventions implemented as appropriate)      Problem: Skin Injury Risk (Adult)  Goal: Identify Related Risk Factors and Signs and Symptoms  Outcome: Ongoing (interventions implemented as appropriate)    Goal: Skin Health and Integrity  Outcome: Ongoing (interventions implemented as appropriate)      Problem: Anemia (Adult)  Goal: Identify Related Risk Factors and Signs and Symptoms  Outcome: Ongoing (interventions implemented as appropriate)    Goal: Symptom Improvement  Outcome: Ongoing (interventions implemented as appropriate)

## 2019-08-11 NOTE — PLAN OF CARE
Problem: Patient Care Overview  Goal: Plan of Care Review  Outcome: Ongoing (interventions implemented as appropriate)   08/11/19 5038   Coping/Psychosocial   Plan of Care Reviewed With patient   Plan of Care Review   Progress no change   OTHER   Outcome Summary OT eval completed. Pt familiar to this OT. Significantly weaker than last admission approx 1 month ago. Pt requires maxA for bed mobility. Unable to stand. Profound weakness in BUE 3-/5 in B shoulders, 4-/5 in biceps/triceps. SBA for balance at EOB. MaxA for all ADL due to weakness. She would benefit from skilled OT to increase her independence with ADL focusing on strengthening, balance, and safety. She has multiple areas of skin breakdown that are new since her last admission and would benefit from therapy to promote skin integrity. SNF placement at discharge will be needed.

## 2019-08-11 NOTE — THERAPY EVALUATION
Acute Care - Speech Language Pathology   Swallow Initial Evaluation [facility]     Patient Name: Karen Snell  : 1945  MRN: [id number]  Today's Date: 2019               Admit Date: 8/10/2019  Clinical bedside swallow evaluation completed. Full range of consistencies except mechanical soft solids were presented. Pt had no overt s/s of aspiration throughout evaluation. She exhibited prolonged mastication and decreased rotary chew with the regular solid. She had mild oral residue, but was able to clear it with a thin liquid wash. Pt has been on a mechanical soft diet and thin liquids at the SNF and during her previous admission at this facility. She reports a poor appetite and that foods she once enjoyed no longer appeal to her.   Recommend:  1. Continuing a mechanical soft solid diet and thin liquids.   2. Meds whole with thin liquids or applesauce.   3. SLP will follow up for diet tolerance.   Aroldo Patel, CCC-SLP 2019 9:04 AM    Visit Dx:     ICD-10-CM ICD-9-CM   1. Symptomatic anemia D64.9 285.9   2. Dysphagia, unspecified type R13.10 787.20     Patient Active Problem List   Diagnosis   • Colon polyps   • Epigastric pain   • History of DVT in adulthood   • Altered mental status   • Hernia, umbilical   • Abnormal finding on imaging   • Inability to walk   • Leukocytosis   • Pneumonia due to infectious organism   • Generalized weakness   • Transaminitis   • CHF (congestive heart failure) (CMS/HCC)   • SOB (shortness of breath)   • Dyspnea on exertion   • Pulmonary hypertension (CMS/HCC)   • Hypokalemia   • Symptomatic anemia   • Failure to thrive in adult   • DVT (deep venous thrombosis) (CMS/HCC)   • PE (pulmonary thromboembolism) (CMS/HCC)   • Hypotension   • Hypernatremia     Past Medical History:   Diagnosis Date   • Arthritis    • Colon polyp    • DVT (deep venous thrombosis) (CMS/HCC)    • GERD (gastroesophageal reflux disease)    • Hypertension    • PE (pulmonary thromboembolism)  (CMS/[facility])    • Sleep apnea      Past Surgical History:   Procedure Laterality Date   • CHOLECYSTECTOMY     • COLONOSCOPY  09/24/2013   • COLONOSCOPY N/A 8/10/2017    Procedure: COLONOSCOPY WITH ANESTHESIA;  Surgeon: Jenna Page MD;  Location: [facility] ENDOSCOPY;  Service:    • ENDOSCOPY N/A 8/10/2017    Procedure: ESOPHAGOGASTRODUODENOSCOPY WITH ANESTHESIA;  Surgeon: Jenna Page MD;  Location: [facility] ENDOSCOPY;  Service:    • JOINT REPLACEMENT          SWALLOW EVALUATION (last 72 hours)      SLP Adult Swallow Evaluation     Row Name 08/11/19 0808                   Rehab Evaluation    Document Type  evaluation  -MB        Subjective Information  no complaints  -MB        Patient Observations  alert;cooperative  -MB        Patient/Family Observations  No family present  -MB           General Information    Patient Profile Reviewed  yes  -MB        Pertinent History Of Current Problem  Anemia, chronic renal failure, leukocytosis, CXR: low lung volumes and crowding of bronchovascular structures. Hx of pneumonia, PE, DVT, GERD, HTN, hiatal hernia, on mechanical soft and thin liquids at SNF and last admission here.   -MB        Current Method of Nutrition  soft textures;ground;thin liquids;other (see comments) once cleared by speech  -MB        Precautions/Limitations, Vision  WFL with corrective lenses  -MB        Precautions/Limitations, Hearing  WFL;for purposes of eval  -MB        Prior Level of Function-Communication  WFL  -MB        Prior Level of Function-Swallowing  mechanical soft textures;thin liquids  -MB        Plans/Goals Discussed with  patient  -MB        Barriers to Rehab  none identified  -MB        Patient's Goals for Discharge  patient did not state  -MB           Pain Assessment    Additional Documentation  Pain Scale: FACES Pre/Post-Treatment (Group)  -MB           Pain Scale: FACES Pre/Post-Treatment    Pain: FACES Scale, Pretreatment  0-->no hurt  -MB           Oral Motor and Function     Dentition Assessment  missing teeth  -MB        Secretion Management  WNL/WFL  -MB        Mucosal Quality  moist, healthy  -MB           Oral Musculature and Cranial Nerve Assessment    Oral Motor General Assessment  generalized oral motor weakness  -MB           General Eating/Swallowing Observations    Eating/Swallowing Skills  fed by SLP;self-fed  -MB        Positioning During Eating  upright in bed  -MB        Utensils Used  spoon;straw  -MB        Consistencies Trialed  regular textures;pureed;thin liquids;nectar/syrup-thick liquids;honey-thick liquids  -MB           Clinical Swallow Eval    Oral Prep Phase  impaired  -MB        Oral Transit  WFL  -MB        Oral Residue  impaired  -MB        Pharyngeal Phase  no overt signs/symptoms of pharyngeal impairment  -MB        Esophageal Phase  unremarkable  -MB        Clinical Swallow Evaluation Summary  Full range of consistencies except mechanical soft solids were presented. Pt had no overt s/s of aspiration throughout evaluation. She exhibited prolonged mastication and decreased rotary chew with the regular solid. She had mild oral residue, but was able to clear it with a thin liquid wash. Pt has been on a mechanical soft diet and thin liquids at the SNF and during her previous admission at this facility. She reports a poor appetite and that foods she once enjoyed no longer appeal to her.   -MB           Oral Prep Concerns    Oral Prep Concerns  prolonged mastication;poor rotary chew  -MB        Prolonged Mastication  regular consistencies  -MB        Poor Rotary Chew  regular consistencies  -MB           Oral Residue Concerns    Oral Residue Concerns  diffuse residue throughout oral cavity  -MB        Diffuse Residue Throughout Oral Cavity  regular consistencies  -MB           Clinical Impression    SLP Swallowing Diagnosis  functional pharyngeal phase;mild;oral dysfunction  -MB        Functional Impact  risk of malnutrition;risk of dehydration  -MB        Rehab  Potential/Prognosis, Swallowing  good, to achieve stated therapy goals  -MB        Swallow Criteria for Skilled Therapeutic Interventions Met  demonstrates skilled criteria  -MB           Recommendations    Therapy Frequency (Swallow)  PRN  -MB        Predicted Duration Therapy Intervention (Days)  until discharge  -MB        SLP Diet Recommendation  soft textures;ground;thin liquids  -MB        Recommended Precautions and Strategies  upright posture during/after eating;small bites of food and sips of liquid;alternate between small bites of food and sips of liquid  -MB        SLP Rec. for Method of Medication Administration  meds whole;with thin liquids;with pudding or applesauce  -MB        Monitor for Signs of Aspiration  yes;cough;gurgly voice;throat clearing;pneumonia  -MB        Anticipated Dischage Disposition  skilled nursing facility  -MB           Swallow Goals (SLP)    Oral Nutrition/Hydration Goal Selection (SLP)  oral nutrition/hydration, SLP goal 1  -MB           Oral Nutrition/Hydration Goal 1 (SLP)    Oral Nutrition/Hydration Goal 1, SLP  Pt will tolerate least restrictive diet with no overt s/s of aspiration.   -MB        Time Frame (Oral Nutrition/Hydration Goal 1, SLP)  by discharge  -MB        Barriers (Oral Nutrition/Hydration Goal 1, SLP)  n/a  -MB        Progress/Outcomes (Oral Nutrition/Hydration Goal 1, SLP)  goal ongoing  -MB          User Key  (r) = Recorded By, (t) = Taken By, (c) = Cosigned By    Initials Name Effective Dates    Aroldo Cloud, CCC-SLP 08/02/16 -           EDUCATION  The patient has been educated in the following areas:   Dysphagia (Swallowing Impairment).    SLP Recommendation and Plan  SLP Swallowing Diagnosis: functional pharyngeal phase, mild, oral dysfunction  SLP Diet Recommendation: soft textures, ground, thin liquids  Recommended Precautions and Strategies: upright posture during/after eating, small bites of food and sips of liquid, alternate between small  bites of food and sips of liquid  SLP Rec. for Method of Medication Administration: meds whole, with thin liquids, with pudding or applesauce     Monitor for Signs of Aspiration: yes, cough, gurgly voice, throat clearing, pneumonia     Swallow Criteria for Skilled Therapeutic Interventions Met: demonstrates skilled criteria  Anticipated Dischage Disposition: skilled nursing facility  Rehab Potential/Prognosis, Swallowing: good, to achieve stated therapy goals  Therapy Frequency (Swallow): PRN  Predicted Duration Therapy Intervention (Days): until discharge       Plan of Care Reviewed With: patient  Plan of Care Review  Plan of Care Reviewed With: patient  Outcome Summary: Clinical bedside swallow evaluation completed. Full range of consistencies except mechanical soft solids were presented. Pt had no overt s/s of aspiration throughout evaluation. She exhibited prolonged mastication and decreased rotary chew with the regular solid. She had mild oral residue, but was able to clear it with a thin liquid wash. Pt has been on a mechanical soft diet and thin liquids at the SNF and during her previous admission at this facility. She reports a poor appetite and that foods she once enjoyed no longer appeal to her. Recommend continuing a mechanical soft solid diet and thin liquids. Meds whole with thin liquids or applesauce. SLP will follow up for diet tolerance.     SLP GOALS     Row Name 08/11/19 0808             Oral Nutrition/Hydration Goal 1 (SLP)    Oral Nutrition/Hydration Goal 1, SLP  Pt will tolerate least restrictive diet with no overt s/s of aspiration.   -MB      Time Frame (Oral Nutrition/Hydration Goal 1, SLP)  by discharge  -MB      Barriers (Oral Nutrition/Hydration Goal 1, SLP)  n/a  -MB      Progress/Outcomes (Oral Nutrition/Hydration Goal 1, SLP)  goal ongoing  -MB        User Key  (r) = Recorded By, (t) = Taken By, (c) = Cosigned By    Initials Name Provider Type    Aroldo Cloud, [facility]-SLP Speech  and Language Pathologist             Time Calculation:   Time Calculation- SLP     Row Name 08/11/19 0903             Time Calculation- SLP    SLP Start Time  0808  -MB      SLP Stop Time  0853  -MB      SLP Time Calculation (min)  45 min  -MB      SLP Received On  08/11/19  -MB      SLP Goal Re-Cert Due Date  08/21/19  -MB        User Key  (r) = Recorded By, (t) = Taken By, (c) = Cosigned By    Initials Name Provider Type    Aroldo Cloud CCC-SLP Speech and Language Pathologist          Therapy Charges for Today     Code Description Service Date Service Provider Modifiers Qty    [id number] HC ST EVAL ORAL PHARYNG SWALLOW 3 8/11/2019 Aroldo Patel CCC-SLP GN 1               Aroldo Patel CCC-BENJAMIN  8/11/2019

## 2019-08-11 NOTE — PLAN OF CARE
Problem: Patient Care Overview  Goal: Plan of Care Review  Outcome: Ongoing (interventions implemented as appropriate)   08/11/19 5424   Coping/Psychosocial   Plan of Care Reviewed With patient   Plan of Care Review   Progress no change   OTHER   Outcome Summary Pt is A&O x4. Blood pressure running lower, other VSS. Pt received 1 unit of blood per orders, tolerated well. Neuro checks q 4 hours. Safety maintained, will continue to monitor.        Problem: Skin Injury Risk (Adult)  Goal: Skin Health and Integrity  Outcome: Ongoing (interventions implemented as appropriate)      Problem: Anemia (Adult)  Goal: Identify Related Risk Factors and Signs and Symptoms  Outcome: Ongoing (interventions implemented as appropriate)

## 2019-08-11 NOTE — PLAN OF CARE
Problem: Patient Care Overview  Goal: Plan of Care Review  Outcome: Ongoing (interventions implemented as appropriate)   08/11/19 0901   Coping/Psychosocial   Plan of Care Reviewed With patient   OTHER   Outcome Summary Clinical bedside swallow evaluation completed. Full range of consistencies except mechanical soft solids were presented. Pt had no overt s/s of aspiration throughout evaluation. She exhibited prolonged mastication and decreased rotary chew with the regular solid. She had mild oral residue, but was able to clear it with a thin liquid wash. Pt has been on a mechanical soft diet and thin liquids at the SNF and during her previous admission at this facility. She reports a poor appetite and that foods she once enjoyed no longer appeal to her. Recommend continuing a mechanical soft solid diet and thin liquids. Meds whole with thin liquids or applesauce. SLP will follow up for diet tolerance.

## 2019-08-11 NOTE — PROGRESS NOTES
[facility] Medicine Services  INPATIENT PROGRESS NOTE    Length of Stay: 0  Date of Admission: 8/10/2019  Primary Care Physician: Eamon Elizalde APRN    Subjective   Chief Complaint: Weakness.    HPI   Patient denies any chest pain or shortness of breath.  Patient's remains very weak.  Patient states she feels a little bit better today.  T-max 99.  T-current 98.1.  Blood pressure still remain on the low side.    Review of Systems   Constitutional: Positive for activity change, appetite change and fatigue. Negative for chills and fever.   HENT: Negative for hearing loss, nosebleeds, tinnitus and trouble swallowing.    Eyes: Negative for visual disturbance.   Respiratory: Negative for cough, chest tightness, shortness of breath and wheezing.    Cardiovascular: Negative for chest pain, palpitations and leg swelling.   Gastrointestinal: Negative for abdominal distention, abdominal pain, blood in stool, constipation, diarrhea, nausea and vomiting.   Endocrine: Negative for cold intolerance, heat intolerance, polydipsia, polyphagia and polyuria.   Genitourinary: Negative for decreased urine volume, difficulty urinating, dysuria, flank pain, frequency and hematuria.   Musculoskeletal: Positive for arthralgias, gait problem and myalgias. Negative for joint swelling.   Skin: Negative for rash.   Allergic/Immunologic: Negative for immunocompromised state.   Neurological: Negative for dizziness, syncope, weakness, light-headedness and headaches.   Hematological: Negative for adenopathy. Does not bruise/bleed easily.   Psychiatric/Behavioral: Positive for confusion. Negative for sleep disturbance. The patient is not nervous/anxious.          All pertinent negatives and positives are as above. All other systems have been reviewed and are negative unless otherwise stated.     Objective    Temp:  [97.6 °F (36.4 °C)-99 °F (37.2 °C)] 98.1 °F (36.7 °C)  Heart Rate:  [81-96] 86  Resp:  [15-16]  16  BP: ()/(34-80) 96/40    Intake/Output Summary (Last 24 hours) at 8/11/2019 1032  Last data filed at 8/11/2019 0909  Gross per 24 hour   Intake 1487 ml   Output --   Net 1487 ml     Physical Exam  Constitutional: She appears well-developed.   HENT:   Head: Normocephalic and atraumatic.   Eyes: Conjunctivae are normal.   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   Neck: Neck supple. No JVD present. No thyromegaly present.   Cardiovascular: Normal rate, regular rhythm, normal heart sounds and intact distal pulses. Exam reveals no gallop and no friction rub.   No murmur heard.  Pulmonary/Chest: Effort normal and breath sounds normal. No respiratory distress. She has no wheezes. She has no rales. She exhibits no tenderness.   Decrease in breath sound bilateral, clear.   Abdominal: Soft. Bowel sounds are normal. She exhibits no distension. There is no tenderness. There is no rebound and no guarding.   Musculoskeletal: She exhibits edema. She exhibits no tenderness or deformity.   +2-3 pitting edema bilateral lower extremities.  Chronic condition.   Lymphadenopathy:     She has no cervical adenopathy.   Neurological: She is alert. She displays normal reflexes. No cranial nerve deficit. She exhibits abnormal muscle tone. Coordination abnormal.   Skin: Skin is warm and dry. No rash noted.   Psychiatric: She has a normal mood and affect. Her behavior is normal.   Nursing note and vitals reviewed.         Results Review:  Lab Results (last 24 hours)     Procedure Component Value Units Date/Time    Hemoglobin A1c [[id number]]  (Normal) Collected:  08/11/19 0610    Specimen:  Blood Updated:  08/11/19 0809     Hemoglobin A1C 5.9 %     Narrative:       Less than 6.0           Non-Diabetic Range  6.0-7.0                 ADA Therapeutic Target  Greater than 7.0        Action Suggested    TSH [[id number]]  (Abnormal) Collected:  08/11/19 0610    Specimen:  Blood Updated:  08/11/19 0757     TSH 5.130 mIU/mL     Lipid  Panel [[id number]]  (Abnormal) Collected:  08/11/19 0610    Specimen:  Blood Updated:  08/11/19 0739     Total Cholesterol 125 mg/dL      Triglycerides 121 mg/dL      HDL Cholesterol 22 mg/dL      LDL Cholesterol  95 mg/dL      LDL/HDL Ratio 3.58    Comprehensive Metabolic Panel [[id number]]  (Abnormal) Collected:  08/11/19 0610    Specimen:  Blood Updated:  08/11/19 0738     Glucose 73 mg/dL      BUN 46 mg/dL      Creatinine 1.51 mg/dL      Sodium 149 mmol/L      Potassium 3.3 mmol/L      Chloride 118 mmol/L      CO2 30.0 mmol/L      Calcium 7.0 mg/dL      Total Protein 4.5 g/dL      Albumin 1.50 g/dL      ALT (SGPT) 54 U/L      AST (SGOT) 73 U/L      Alkaline Phosphatase 140 U/L      Total Bilirubin 0.5 mg/dL      eGFR  African Amer 41 mL/min/1.73      Globulin 3.0 gm/dL      A/G Ratio 0.5 g/dL      BUN/Creatinine Ratio 30.5     Anion Gap 1.0 mmol/L     Narrative:       GFR Normal >60  Chronic Kidney Disease <60  Kidney Failure <15    Magnesium [[id number]]  (Normal) Collected:  08/11/19 0610    Specimen:  Blood Updated:  08/11/19 0728     Magnesium 1.8 mg/dL     CBC Auto Differential [[id number]]  (Abnormal) Collected:  08/11/19 0610    Specimen:  Blood Updated:  08/11/19 0722     WBC 12.06 10*3/mm3      RBC 3.50 10*6/mm3      Hemoglobin 9.1 g/dL      Hematocrit 28.9 %      MCV 82.6 fL      MCH 26.0 pg      MCHC 31.5 g/dL      RDW 18.6 %      RDW-SD 55.5 fl      MPV 10.5 fL      Platelets 203 10*3/mm3      Neutrophil % 51.4 %      Lymphocyte % 23.7 %      Monocyte % 8.9 %      Eosinophil % 11.4 %      Basophil % 0.3 %      Immature Grans % 4.3 %      Neutrophils, Absolute 6.20 10*3/mm3      Lymphocytes, Absolute 2.86 10*3/mm3      Monocytes, Absolute 1.07 10*3/mm3      Eosinophils, Absolute 1.37 10*3/mm3      Basophils, Absolute 0.04 10*3/mm3      Immature Grans, Absolute 0.52 10*3/mm3      nRBC 0.0 /100 WBC     Vitamin B12 [[id number]]  (Normal) Collected:  08/10/19 1216    Specimen:  Blood Updated:  08/10/19 0197      Vitamin B-12 676 pg/mL     Folate [[id number]]  (Normal) Collected:  08/10/19 1216    Specimen:  Blood Updated:  08/10/19 1753     Folate 6.17 ng/mL     Ferritin [[id number]]  (Abnormal) Collected:  08/10/19 1216    Specimen:  Blood Updated:  08/10/19 1723     Ferritin 698.00 ng/mL     Reticulocytes [[id number]]  (Normal) Collected:  08/10/19 1216    Specimen:  Blood Updated:  08/10/19 1706     Reticulocyte % 1.64 %      Reticulocyte Absolute 0.0523 10*6/mm3     Iron Profile [[id number]]  (Abnormal) Collected:  08/10/19 1216    Specimen:  Blood Updated:  08/10/19 1654     Iron 44 mcg/dL      TIBC 116 mcg/dL      Iron Saturation 38 %     POC Occult Blood Stool [[id number]]  (Normal) Collected:  08/10/19 1446    Specimen:  Stool Updated:  08/10/19 1447     Fecal Occult Blood Negative     Lot Number [id number]     Expiration Date 12/31/2019     DEVELOPER LOT NUMBER 147     DEVELOPER EXPIRATION DATE 12/31/2019     Positive Control Positive     Negative Control Negative    Urinalysis With Culture If Indicated - Urine, Catheter In/Out [[id number]]  (Normal) Collected:  08/10/19 1318    Specimen:  Urine, Catheter In/Out Updated:  08/10/19 1334     Color, UA Yellow     Appearance, UA Clear     pH, UA 6.5     Specific Gravity, UA 1.012     Glucose, UA Negative     Ketones, UA Negative     Bilirubin, UA Negative     Blood, UA Negative     Protein, UA Negative     Leuk Esterase, UA Negative     Nitrite, UA Negative     Urobilinogen, UA 0.2 E.U./dL    Narrative:       Urine microscopic not indicated.    Jay Draw [[id number]] Collected:  08/10/19 1216    Specimen:  Blood Updated:  08/10/19 1331    Narrative:       The following orders were created for panel order Jay Draw.  Procedure                               Abnormality         Status                     ---------                               -----------         ------                     Light Blue Top[[id number]]                                   Final result                Green Top (Gel)[[id number]]                                  Final result               Lavender Top[[id number]]                                     Final result               Red Top[[id number]]                                          Final result                 Please view results for these tests on the individual orders.    Light Blue Top [[id number]] Collected:  08/10/19 1216    Specimen:  Blood Updated:  08/10/19 1331     Extra Tube hold for add-on     Comment: Auto resulted       Green Top (Gel) [[id number]] Collected:  08/10/19 1216    Specimen:  Blood Updated:  08/10/19 1331     Extra Tube Hold for add-ons.     Comment: Auto resulted.       Lavender Top [[id number]] Collected:  08/10/19 1216    Specimen:  Blood Updated:  08/10/19 1331     Extra Tube hold for add-on     Comment: Auto resulted       Red Top [[id number]] Collected:  08/10/19 1216    Specimen:  Blood Updated:  08/10/19 1331     Extra Tube Hold for add-ons.     Comment: Auto resulted.       Comprehensive Metabolic Panel [[id number]]  (Abnormal) Collected:  08/10/19 1216    Specimen:  Blood Updated:  08/10/19 1242     Glucose 73 mg/dL      BUN 53 mg/dL      Creatinine 1.68 mg/dL      Sodium 147 mmol/L      Potassium 3.4 mmol/L      Chloride 115 mmol/L      CO2 32.0 mmol/L      Calcium 6.8 mg/dL      Total Protein 4.8 g/dL      Albumin 1.70 g/dL      ALT (SGPT) 64 U/L      AST (SGOT) 77 U/L      Alkaline Phosphatase 142 U/L      Total Bilirubin 0.4 mg/dL      eGFR  African Amer 36 mL/min/1.73      Globulin 3.1 gm/dL      A/G Ratio 0.5 g/dL      BUN/Creatinine Ratio 31.5     Anion Gap 0.0 mmol/L     Narrative:       GFR Normal >60  Chronic Kidney Disease <60  Kidney Failure <15    CBC & Differential [[id number]] Collected:  08/10/19 1216    Specimen:  Blood Updated:  08/10/19 1235    Narrative:       The following orders were created for panel order CBC & Differential.  Procedure                               Abnormality         Status                      ---------                               -----------         ------                     CBC Auto Differential[[id number]]        Abnormal            Final result                 Please view results for these tests on the individual orders.    CBC Auto Differential [[id number]]  (Abnormal) Collected:  08/10/19 1216    Specimen:  Blood Updated:  08/10/19 1235     WBC 11.20 10*3/mm3      RBC 3.20 10*6/mm3      Hemoglobin 8.3 g/dL      Hematocrit 26.6 %      MCV 83.1 fL      MCH 25.9 pg      MCHC 31.2 g/dL      RDW 18.8 %      RDW-SD 56.3 fl      MPV 10.4 fL      Platelets 220 10*3/mm3      Neutrophil % 46.1 %      Lymphocyte % 30.6 %      Monocyte % 7.8 %      Eosinophil % 11.5 %      Basophil % 0.3 %      Immature Grans % 3.7 %      Neutrophils, Absolute 5.17 10*3/mm3      Lymphocytes, Absolute 3.43 10*3/mm3      Monocytes, Absolute 0.87 10*3/mm3      Eosinophils, Absolute 1.29 10*3/mm3      Basophils, Absolute 0.03 10*3/mm3      Immature Grans, Absolute 0.41 10*3/mm3      nRBC 0.0 /100 WBC            Cultures:       Radiology Data:    Imaging Results (last 24 hours)     Procedure Component Value Units Date/Time    XR Chest 1 View [[id number]] Collected:  08/10/19 1245     Updated:  08/10/19 1248    Narrative:       XR CHEST 1 VW- 8/10/2019 12:43 PM CDT     HISTORY: AMS       COMPARISON: 06/20/2019.     FINDINGS:   The patient's PICC line extends into the SVC. The lungs are  hypoinflated. There is prominence of the pulmonary arteries, which is  likely due to hypoinflation. The cardiac silhouette is stable.      The osseous structures and surrounding soft tissues demonstrate no acute  abnormality.       Impression:       1. Low lung volumes and crowding of the bronchovascular structures.        This report was finalized on 08/10/2019 12:45 by Dr. Harley Madison MD.          No Known Allergies    Scheduled meds:     aspirin 81 mg Oral Daily   IV Fluids 1000 mL + additives 60 mL/hr Intravenous Daily   pantoprazole 40 mg  Oral Daily   sodium chloride 3 mL Intravenous Q12H       PRN meds:  ondansetron  •  sodium chloride    Assessment/Plan       CHF (congestive heart failure) (CMS/HCC)    Hypokalemia    Symptomatic anemia    Failure to thrive in adult    DVT (deep venous thrombosis) (CMS/HCC)    PE (pulmonary thromboembolism) (CMS/HCC)    Hypotension    Hypernatremia      Plan:  Aymptomatic anemia.  Hemoglobin on 2019 was 9.2.  Today hemoglobin is 9.1.  Hemoccult was negative.  SP 1 unit transfusion through by ER 8/10/19.  Probably fluctuation of hemoglobin probably secondary to Bumex.  No sign of acute bleed.     History of CHF/history of hypertension/pulmonary hypertension  Echocardiogram 2019- ejection fraction 56%, trace mitral valve regurgitation, trace tricuspid valve regurgitation-right ventricular systolic pressure moderately elevated.  Hold all blood pressure medication and Bumex due to hypotension.  Continue aspirin.  Hold Bumex.     History DVT/pulmonary embolus.  Continue Xarelto.  Chest x-ray-low lung volumes and crowding of the bronchovascular structures.     Hypotension.  Hold blood pressure medication.  IV fluids for now.     Hypernatremia.  D5 water slowly. Banana bag with free water.     Depression.  Hold Wellbutrin.  Continue Celexa.  Remeron at night.     Hypokalemia.  P.o. potassium.     Chronic renal failure.  Improving slowly.  Slow IV hydration.     History of reflux/hiatal hernia.  Hold Aricept due to GI side effects.  Protonix daily.  Zofran as needed.    High TSH.  Free T4.     Leukocytosis.  Will follow.     Chronic pain.  Hold Ultram till patient is more alert.    Nutrition.  Soft texture diet. Banana bags.      Deconditioning/generalized weakness.  PT and OT consult.     Discharge Plannin- 4 days. DNR. DNI.     Rob Bundy MD   19   10:32 AM

## 2019-08-12 PROBLEM — D64.9 SYMPTOMATIC ANEMIA: Status: RESOLVED | Noted: 2019-01-01 | Resolved: 2019-01-01

## 2019-08-12 NOTE — THERAPY TREATMENT NOTE
Acute Care - Occupational Therapy Treatment Note  [facility]     Patient Name: Karen Snell  : 1945  MRN: [id number]  Today's Date: 2019  Onset of Illness/Injury or Date of Surgery: 08/10/19  Date of Referral to OT: 19  Referring Physician: Dr. Bundy    Admit Date: 8/10/2019       ICD-10-CM ICD-9-CM   1. Symptomatic anemia D64.9 285.9   2. Dysphagia, unspecified type R13.10 787.20   3. Decreased activities of daily living (ADL) R68.89 780.99     Patient Active Problem List   Diagnosis   • Colon polyps   • Epigastric pain   • History of DVT in adulthood   • Altered mental status   • Hernia, umbilical   • Abnormal finding on imaging   • Inability to walk   • Leukocytosis   • Pneumonia due to infectious organism   • Generalized weakness   • Transaminitis   • CHF (congestive heart failure) (CMS/HCC)   • SOB (shortness of breath)   • Dyspnea on exertion   • Pulmonary hypertension (CMS/HCC)   • Hypokalemia   • Symptomatic anemia   • Failure to thrive in adult   • DVT (deep venous thrombosis) (CMS/HCC)   • PE (pulmonary thromboembolism) (CMS/HCC)   • Hypotension   • Hypernatremia     Past Medical History:   Diagnosis Date   • Arthritis    • Colon polyp    • DVT (deep venous thrombosis) (CMS/HCC)    • GERD (gastroesophageal reflux disease)    • Hypertension    • PE (pulmonary thromboembolism) (CMS/HCC)    • Sleep apnea      Past Surgical History:   Procedure Laterality Date   • CHOLECYSTECTOMY     • COLONOSCOPY  2013   • COLONOSCOPY N/A 8/10/2017    Procedure: COLONOSCOPY WITH ANESTHESIA;  Surgeon: Jenna Page MD;  Location: [facility] ENDOSCOPY;  Service:    • ENDOSCOPY N/A 8/10/2017    Procedure: ESOPHAGOGASTRODUODENOSCOPY WITH ANESTHESIA;  Surgeon: Jenna Page MD;  Location: [facility] ENDOSCOPY;  Service:    • JOINT REPLACEMENT         Therapy Treatment    Rehabilitation Treatment Summary     Row Name 19 0945             Treatment Time/Intention    Discipline  occupational therapy  assistant  -TS      Document Type  therapy note (daily note)  -TS      Subjective Information  complains of;weakness;fatigue  -TS2      Patient Effort  fair  -TS2      Existing Precautions/Restrictions  fall  -TS2      Recorded by [TS] Zenia Ibarra COTA/L 08/12/19 0945  [TS2] Zenia Ibarra PINTO/L 08/12/19 1127      Row Name 08/12/19 0945             Cognitive Assessment/Intervention- PT/OT    Personal Safety Interventions  fall prevention program maintained;nonskid shoes/slippers when out of bed  -TS      Recorded by [TS] Zenia Ibarra COTA/L 08/12/19 1127      Row Name 08/12/19 0945             Bed Mobility Assessment/Treatment    Scooting/Bridging Hettinger (Bed Mobility)  dependent (less than 25% patient effort);2 person assist bed in trendelenberg  -TS      Supine-Sit Hettinger (Bed Mobility)  maximum assist (25% patient effort)  -TS      Sit-Supine Hettinger (Bed Mobility)  maximum assist (25% patient effort)  -TS      Assistive Device (Bed Mobility)  bed rails;draw sheet  -TS      Recorded by [TS] Zenia Ibarra COTA/L 08/12/19 1127      Row Name 08/12/19 0945             Motor Skills Assessment/Interventions    Additional Documentation  Therapeutic Exercise (Group);Therapeutic Exercise Interventions (Group)  -TS      Recorded by [TS] Zenia Ibarra COTA/L 08/12/19 1127      Row Name 08/12/19 0945             Therapeutic Exercise    Upper Extremity (Therapeutic Exercise)  bicep curl, bilateral  -TS      Upper Extremity Range of Motion (Therapeutic Exercise)  shoulder horizontal abduction/adduction, bilateral  -TS      Exercise Type (Therapeutic Exercise)  AAROM (active assistive range of motion)  -TS      Position (Therapeutic Exercise)  seated  -TS      Sets/Reps (Therapeutic Exercise)  2x5-10  -TS      Recorded by [TS] Zenia Ibarra COTA/L 08/12/19 1127      Row Name 08/12/19 0945             Positioning and Restraints    Pre-Treatment Position  in bed   -TS      Post Treatment Position  bed  -TS      In Bed  fowlers;call light within reach;encouraged to call for assist;side rails up x2  -TS      Recorded by [TS] Zenia Ibarra COTA/L 08/12/19 1127      Row Name 08/12/19 0945             Pain Scale: Numbers Pre/Post-Treatment    Pain Scale: Numbers, Pretreatment  0/10 - no pain  -TS      Pain Scale: Numbers, Post-Treatment  0/10 - no pain  -TS      Recorded by [TS] Zenia Ibarra COTA/L 08/12/19 1127      Row Name                Wound 08/10/19 1320 Bilateral coccyx    Wound - Properties Group Date first assessed: 08/10/19 [NS] Time first assessed: 1320 [NS] Side: Bilateral [NS] Location: coccyx [NS] Stage, Pressure Injury: Stage 2 [NS] Recorded by:  [NS] Antonio Sigala RN 08/10/19 1706    Row Name                Wound 08/10/19 1320 Left foot    Wound - Properties Group Date first assessed: 08/10/19 [NS] Time first assessed: 1320 [NS] Side: Left [NS] Location: foot [NS] Stage, Pressure Injury: unstageable;deep tissue injury [NS] Recorded by:  [NS] Antonio Sigala RN 08/10/19 1708    Row Name                Wound 08/10/19 1730 Right posterior;upper thigh MASD (Moisutre associated skin damage)    Wound - Properties Group Date first assessed: 08/10/19 [MC] Time first assessed: 1730 [MC] Present on Hospital Admission: Y [MC] Side: Right [MC] Orientation: posterior;upper [MC] Location: thigh [MC] Primary Wound Type: MASD [MC] Recorded by:  [MC] Jazzy Everett RN 08/10/19 1826    Row Name                Wound 08/10/19 1730 Left upper;posterior thigh MASD (Moisutre associated skin damage)    Wound - Properties Group Date first assessed: 08/10/19 [MC] Time first assessed: 1730 [MC] Present on Hospital Admission: Y [MC] Side: Left [MC] Orientation: upper;posterior [MC] Location: thigh [MC] Primary Wound Type: MASD [MC] Recorded by:  [MC] Jazzy Everett RN 08/10/19 1827    Row Name 08/12/19 0984             Outcome Summary/Treatment Plan  (OT)    Daily Summary of Progress (OT)  progress toward functional goals is gradual  -TS      Recorded by [TS] Zenia Ibarra, PINTO/L 08/12/19 1127        User Key  (r) = Recorded By, (t) = Taken By, (c) = Cosigned By    Initials Name Effective Dates Discipline    TS Zenia Ibarra, PINTO/L 08/02/16 -  OT    Antonio Polacno RN 08/02/16 -  Nurse     Jazzy Everett RN 08/02/16 -  Nurse        Wound 08/10/19 1320 Bilateral coccyx (Active)   Dressing Appearance intact;dry 8/12/2019  8:30 AM   Closure HUAN 8/12/2019  8:30 AM   Base dressing in place, unable to visualize 8/12/2019  8:30 AM   Periwound intact;dry 8/11/2019  8:20 PM   Periwound Temperature warm 8/11/2019  8:20 PM   Dressing Care, Wound foam 8/12/2019  8:30 AM       Wound 08/10/19 1320 Left foot (Active)   Dressing Appearance dry;intact 8/12/2019  8:30 AM   Closure HUAN 8/12/2019  8:30 AM   Base dressing in place, unable to visualize 8/12/2019  8:30 AM   Periwound dry;intact 8/11/2019  8:20 PM   Drainage Amount none 8/12/2019  8:30 AM   Dressing Care, Wound foam 8/12/2019  8:30 AM       Wound 08/10/19 1730 Right posterior;upper thigh MASD (Moisutre associated skin damage) (Active)   Dressing Appearance open to air 8/12/2019  8:30 AM   Base dry;pink 8/12/2019  8:30 AM   Periwound dry 8/12/2019  8:30 AM   Periwound Temperature warm 8/12/2019  8:30 AM   Periwound Skin Turgor soft 8/12/2019  8:30 AM   Drainage Amount none 8/12/2019  8:30 AM       Wound 08/10/19 1730 Left upper;posterior thigh MASD (Moisutre associated skin damage) (Active)   Dressing Appearance open to air 8/12/2019  8:30 AM   Base dry;pink 8/12/2019  8:30 AM   Periwound dry 8/12/2019  8:30 AM   Periwound Temperature warm 8/12/2019  8:30 AM   Periwound Skin Turgor soft 8/12/2019  8:30 AM   Drainage Amount none 8/12/2019  8:30 AM       Occupational Therapy Education     Title: PT OT SLP Therapies (Done)     Topic: Occupational Therapy (Done)     Point: ADL training  (Done)     Description: Instruct learner(s) on proper safety adaptation and remediation techniques during self care or transfers.   Instruct in proper use of assistive devices.    Learning Progress Summary           Patient Acceptance, E, VU by  at 8/11/2019  3:24 PM    Comment:  OT POC, benefits of activity   Family Acceptance, E, VU by  at 8/11/2019  3:24 PM    Comment:  OT POC, benefits of activity                   Point: Home exercise program (Done)     Description: Instruct learner(s) on appropriate technique for monitoring, assisting and/or progressing therapeutic exercises/activities.    Learning Progress Summary           Patient Acceptance, E, VU by  at 8/11/2019  3:24 PM    Comment:  OT POC, benefits of activity   Family Acceptance, E, VU by  at 8/11/2019  3:24 PM    Comment:  OT POC, benefits of activity                               User Key     Initials Effective Dates Name Provider Type Discipline     04/09/19 -  Nick Newsome, OTR/L, CNT Occupational Therapist OT                OT Recommendation and Plan  Outcome Summary/Treatment Plan (OT)  Daily Summary of Progress (OT): progress toward functional goals is gradual  Daily Summary of Progress (OT): progress toward functional goals is gradual  Outcome Measures     Row Name 08/12/19 1100 08/11/19 1524          How much help from another is currently needed...    Putting on and taking off regular lower body clothing?  1  -TS  1  -AC     Bathing (including washing, rinsing, and drying)  1  -TS  1  -AC     Toileting (which includes using toilet bed pan or urinal)  1  -TS  1  -AC     Putting on and taking off regular upper body clothing  2  -TS  2  -AC     Taking care of personal grooming (such as brushing teeth)  2  -TS  2  -AC     Eating meals  2  -TS  2  -AC     AM-PAC 6 Clicks Score (OT)  9  -TS  9  -AC        Functional Assessment    Outcome Measure Options  AM-PAC 6 Clicks Daily Activity (OT)  -TS  AM-PAC 6 Clicks Daily Activity (OT)   -AC       User Key  (r) = Recorded By, (t) = Taken By, (c) = Cosigned By    Initials Name Provider Type    AC Nick Newsome, OTR/L, CNT Occupational Therapist    TS Zenia Ibarra COTA/L Occupational Therapy Assistant           Time Calculation:   Time Calculation- OT     Row Name 08/12/19 1127             Time Calculation- OT    OT Start Time  0945  -TS      OT Stop Time  1015  -TS      OT Time Calculation (min)  30 min  -TS      Total Timed Code Minutes- OT  30 minute(s)  -TS      OT Received On  08/12/19  -         Timed Charges    51739 - OT Self Care/Mgmt Minutes  30  -TS        User Key  (r) = Recorded By, (t) = Taken By, (c) = Cosigned By    Initials Name Provider Type    TS Zenia Ibarra COTA/L Occupational Therapy Assistant        Therapy Charges for Today     Code Description Service Date Service Provider Modifiers Qty    [id number] HC OT SELF CARE/MGMT/TRAIN EA 15 MIN 8/12/2019 Zenia Ibarra COTA/L GO 2               Zenia ANDERSON. BETTY Ibarra  8/12/2019

## 2019-08-12 NOTE — PLAN OF CARE
Problem: Patient Care Overview  Goal: Plan of Care Review  Outcome: Outcome(s) achieved Date Met: 08/12/19 08/12/19 1427   Coping/Psychosocial   Plan of Care Reviewed With patient;family   Plan of Care Review   Progress improving   OTHER   Outcome Summary K 4.4, Na 144, Hgb 9.8. Safety maintained, bed alarm set. Reposition q 2 hours. No new skin breakdown noted.      Goal: Individualization and Mutuality  Outcome: Outcome(s) achieved Date Met: 08/12/19    Goal: Discharge Needs Assessment  Outcome: Outcome(s) achieved Date Met: 08/12/19    Goal: Interprofessional Rounds/Family Conf  Outcome: Outcome(s) achieved Date Met: 08/12/19      Problem: Fall Risk (Adult)  Goal: Identify Related Risk Factors and Signs and Symptoms  Outcome: Outcome(s) achieved Date Met: 08/12/19    Goal: Absence of Fall  Outcome: Outcome(s) achieved Date Met: 08/12/19      Problem: Skin Injury Risk (Adult)  Goal: Identify Related Risk Factors and Signs and Symptoms  Outcome: Outcome(s) achieved Date Met: 08/12/19    Goal: Skin Health and Integrity  Outcome: Outcome(s) achieved Date Met: 08/12/19      Problem: Anemia (Adult)  Goal: Identify Related Risk Factors and Signs and Symptoms  Outcome: Outcome(s) achieved Date Met: 08/12/19    Goal: Symptom Improvement  Outcome: Outcome(s) achieved Date Met: 08/12/19

## 2019-08-12 NOTE — PROGRESS NOTES
Discharge Planning Assessment  [facility]     Patient Name: Karen Snell  MRN: [id number]  Today's Date: 8/12/2019    Admit Date: 8/10/2019    Discharge Needs Assessment     Row Name 08/12/19 1209       Living Environment    Lives With  facility resident    Name(s) of Who Lives With Patient  PT IS FROM [facility]    Current Living Arrangements  extended care facility    Primary Care Provided by  other (see comments) [facility]    Provides Primary Care For  no one    Family Caregiver if Needed  child(josiah), adult;other (see comments)    Quality of Family Relationships  helpful;involved    Able to Return to Prior Arrangements  yes    Living Arrangement Comments  PLAN IS FOR RETURN TO [facility] WHEN MEDICALLY STABLE.        Resource/Environmental Concerns    Resource/Environmental Concerns  none    Transportation Concerns  car, none       Transition Planning    Patient/Family Anticipates Transition to  inpatient rehabilitation facility;long term care facility    Patient/Family Anticipated Services at Transition  skilled nursing    Transportation Anticipated  health plan transportation       Discharge Needs Assessment    Readmission Within the Last 30 Days  no previous admission in last 30 days    Concerns to be Addressed  denies needs/concerns at this time    Equipment Currently Used at Home  other (see comments) NH PROVIDES DME    Anticipated Changes Related to Illness  inability to care for self    Equipment Needed After Discharge  other (see comments) NH PROVIDES DME    Outpatient/Agency/Support Group Needs  skilled nursing facility    Discharge Facility/Level of Care Needs  nursing facility, skilled    Discharge Coordination/Progress  PT IS FROM [facility] SKILLED LEVEL WITH NO BED HOLD. PER PEGGY AT [facility] ([phone]) THEY WILL ACCEPT PT BACK AT [place]. WILL FOLLOW FOR RETURN TO [facility] WHEN READY        Discharge Plan     Row Name 08/12/19 1211       Plan    Plan  RETURN TO [facility] AT [place]        Destination      No  service coordination in this encounter.      Durable Medical Equipment      No service coordination in this encounter.      Dialysis/Infusion      No service coordination in this encounter.      Home Medical Care      No service coordination in this encounter.      Therapy      No service coordination in this encounter.      Community Resources      No service coordination in this encounter.          Demographic Summary    No documentation.       Functional Status    No documentation.       Psychosocial    No documentation.       Abuse/Neglect    No documentation.       Legal    No documentation.       Substance Abuse    No documentation.       Patient Forms    No documentation.           ALEX Peace

## 2019-08-12 NOTE — DISCHARGE SUMMARY
"    [facility] Medicine Services  DISCHARGE SUMMARY       Date of Admission: 8/10/2019  Date of Discharge:  8/12/2019  Primary Care Physician: Eamon Elizalde APRN    Presenting Problem/History of Present Illness:  Symptomatic anemia [D64.9]  Symptomatic anemia [D64.9]     Final Discharge Diagnoses:  Active Hospital Problems    Diagnosis   • Failure to thrive in adult   • DVT (deep venous thrombosis) (CMS/[facility])   • PE (pulmonary thromboembolism) (CMS/[facility])   • Hypotension   • Hypernatremia   • Hypokalemia   • CHF (congestive heart failure) (CMS/[facility])     Discussed with Dr. Hui; diastolic congestive heart failure     FREDERIC  Hypokalemia  Hypernatremia  Chronic Diastolic CHF    Consults: NA    Procedures Performed: NA    Pertinent Test Results: NA    Chief Complaint on Day of Discharge: Feeling better    History of Present Illness on Day of Discharge:   Doing well.  No CP or SOA.  No light-headedness or dizziness  No black or bloody stools    Hospital Course:  The patient is a 74 y.o. female who presented to [facility] with weakness.  She has not been eating or drinking well.  She was a bit anemic below her baseline.  However, she had no signs of bleeding.  She was transfused.  She feels better.      Condition on Discharge:      Physical Exam on Discharge:  BP 92/47 (BP Location: Right arm, Patient Position: Lying)   Pulse 89   Temp 97.8 °F (36.6 °C) (Oral)   Resp 20   Ht 165.1 cm (65\")   Wt 102 kg (225 lb)   SpO2 94%   BMI 37.44 kg/m²   Physical Exam   Constitutional: She is oriented to person, place, and time. She appears well-developed and well-nourished.   HENT:   Head: Normocephalic and atraumatic.   Right Ear: External ear normal.   Left Ear: External ear normal.   Nose: Nose normal.   Mouth/Throat: Oropharynx is clear and moist.   Eyes: Conjunctivae and EOM are normal.   Neck: Normal range of motion. Neck supple.   Cardiovascular: Normal rate, regular rhythm and " normal heart sounds.   Pulmonary/Chest: Effort normal and breath sounds normal.   Abdominal: Soft. Bowel sounds are normal. She exhibits no distension. There is no tenderness.   Musculoskeletal: Normal range of motion.   Neurological: She is alert and oriented to person, place, and time.   Skin: Skin is warm and dry.   Psychiatric: She has a normal mood and affect. Her speech is normal and behavior is normal. Cognition and memory are normal.         Discharge Disposition:  Skilled Nursing Facility (DC - External)    Discharge Medications:     Discharge Medications      Changes to Medications      Instructions Start Date   mirtazapine 7.5 MG tablet  Commonly known as:  REMERON  What changed:  how much to take   7.5 mg, Oral, Nightly         Continue These Medications      Instructions Start Date   aspirin 81 MG EC tablet   81 mg, Oral, Daily      bumetanide 1 MG tablet  Commonly known as:  BUMEX   1 mg, Oral, 2 Times Daily      buPROPion  MG 24 hr tablet  Commonly known as:  WELLBUTRIN XL   150 mg, Oral, Daily      citalopram 10 MG tablet  Commonly known as:  CeleXA   10 mg, Oral, Daily      cyproheptadine 2 MG half tablet  Commonly known as:  PERIACTIN   2 mg, Oral, 4 Times Daily      cyproheptadine 4 MG tablet  Commonly known as:  PERIACTIN   4 mg, Oral, 4 Times Daily   Start Date:  8/14/2019     diphenhydrAMINE 25 MG tablet  Commonly known as:  BENADRYL   25 mg, Oral, Every 6 Hours PRN      donepezil 10 MG tablet  Commonly known as:  ARICEPT   10 mg, Oral, Daily      multivitamin with minerals tablet tablet   1 tablet, Oral, Daily      pantoprazole 40 MG EC tablet  Commonly known as:  PROTONIX   40 mg, Oral, Daily      rivaroxaban 20 MG tablet  Commonly known as:  XARELTO   20 mg, Oral, Daily      similac liquid protein concentration   Oral      traMADol 50 MG tablet  Commonly known as:  ULTRAM   50 mg, Oral, Every 6 Hours PRN         Stop These Medications    ROCEPHIN 1 g injection  Generic drug:   cefTRIAXone            Discharge Diet: Heart healthy    Activity at Discharge:  As tolerated    Discharge Care Plan/Instructions: Go to ER for fever, black or bloody stools    Follow-up Appointments:   Future Appointments   Date Time Provider Department Center   9/26/2019 11:00 AM Fercho Hui MD [facility]       Test Results Pending at Discharge: JOSE M Moreno MD  08/12/19  1:38 PM    Time: 35 minutes

## 2019-08-12 NOTE — PROGRESS NOTES
Continued Stay Note  [facility]     Patient Name: Karen Snell  MRN: [id number]  Today's Date: 8/12/2019    Admit Date: 8/10/2019    Discharge Plan     Row Name 08/12/19 1403       Plan    Final Discharge Disposition Code  03 - skilled nursing facility (SNF)    Final Note  PT IS BEING DCD TO [facility] TODAY SKILLED LEVEL. NH NOTIFIED. CALL REPORT [phone]    Row Name 08/12/19 1216       Plan    Plan  RETURN TO [facility] AT OR        Discharge Codes    No documentation.       Expected Discharge Date and Time     Expected Discharge Date Expected Discharge Time    Aug 12, 2019             ALEX Peace

## 2019-08-12 NOTE — PLAN OF CARE
Problem: Patient Care Overview  Goal: Plan of Care Review  Outcome: Ongoing (interventions implemented as appropriate)   08/12/19 8755   Coping/Psychosocial   Plan of Care Reviewed With patient   Plan of Care Review   Progress improving   OTHER   Outcome Summary Pt A&O x4. VSS. No c/o pain. C/o left arm itching with relief noted with use of lotion and baby powder. Safety maintained, will continue to monitor.        Problem: Skin Injury Risk (Adult)  Goal: Skin Health and Integrity  Outcome: Ongoing (interventions implemented as appropriate)      Problem: Anemia (Adult)  Goal: Identify Related Risk Factors and Signs and Symptoms  Outcome: Ongoing (interventions implemented as appropriate)

## 2019-08-13 NOTE — THERAPY DISCHARGE NOTE
Acute Care - Occupational Therapy Discharge Summary  [facility]     Patient Name: Karen Snell  : 1945  MRN: [id number]    Today's Date: 2019  Onset of Illness/Injury or Date of Surgery: 08/10/19    Date of Referral to OT: 19  Referring Physician: Dr. Bundy      Admit Date: 8/10/2019        OT Recommendation and Plan    Visit Dx:    ICD-10-CM ICD-9-CM   1. Symptomatic anemia D64.9 285.9   2. Dysphagia, unspecified type R13.10 787.20   3. Decreased activities of daily living (ADL) R68.89 780.99               Rehab Goal Summary     Row Name 19 0700             Transfer Goal 1 (OT)    Activity/Assistive Device (Transfer Goal 1, OT)  bed-to-chair/chair-to-bed;toilet;walker, rolling  -TS      [place] Level/Cues Needed (Transfer Goal 1, OT)  minimum assist (75% or more patient effort)  -TS      Time Frame (Transfer Goal 1, OT)  long term goal (LTG);10 days  -TS      Progress/Outcome (Transfer Goal 1, OT)  goal not met  -TS         Dressing Goal 1 (OT)    Activity/Assistive Device (Dressing Goal 1, OT)  dressing skills, all  -TS      [place]/Cues Needed (Dressing Goal 1, OT)  minimum assist (75% or more patient effort)  -TS      Time Frame (Dressing Goal 1, OT)  long term goal (LTG);10 days  -TS      Progress/Outcome (Dressing Goal 1, OT)  goal not met  -TS         Strength Goal 1 (OT)    Strength Goal 1 (OT)  pt will increse BUE strength by 1 muscle grade to increase independence with ADL  -TS      Time Frame (Strength Goal 1, OT)  long term goal (LTG);10 days  -TS      Progress/Outcome (Strength Goal 1, OT)  goal not met  -TS        User Key  (r) = Recorded By, (t) = Taken By, (c) = Cosigned By    Initials Name Provider Type Discipline    TS Zenia Ibarra, PINTO/L Occupational Therapy Assistant OT          Outcome Measures     Row Name 19 1100 19 1524          How much help from another is currently needed...    Putting on and taking off regular lower body clothing?   1  -TS  1  -AC     Bathing (including washing, rinsing, and drying)  1  -TS  1  -AC     Toileting (which includes using toilet bed pan or urinal)  1  -TS  1  -AC     Putting on and taking off regular upper body clothing  2  -TS  2  -AC     Taking care of personal grooming (such as brushing teeth)  2  -TS  2  -AC     Eating meals  2  -TS  2  -AC     AM-PAC 6 Clicks Score (OT)  9  -TS  9  -AC        Functional Assessment    Outcome Measure Options  AM-PAC 6 Clicks Daily Activity (OT)  -TS  AM-PAC 6 Clicks Daily Activity (OT)  -AC       User Key  (r) = Recorded By, (t) = Taken By, (c) = Cosigned By    Initials Name Provider Type    AC Nick Newsome, OTR/L, CNT Occupational Therapist    Zenia Pickett COTA/L Occupational Therapy Assistant          Therapy Suggested Charges     Code   Minutes Charges    51721 (CPT®) Hc Ot Neuromusc Re Education Ea 15 Min      26730 (CPT®) Hc Ot Ther Proc Ea 15 Min      68670 (CPT®) Hc Ot Therapeutic Act Ea 15 Min      24951 (CPT®) Hc Ot Manual Therapy Ea 15 Min      19624 (CPT®) Hc Ot Iontophoresis Ea 15 Min      16181 (CPT®) Hc Ot Elec Stim Ea-Per 15 Min      34574 (CPT®) Hc Ot Ultrasound Ea 15 Min      30238 (CPT®) Hc Ot Self Care/Mgmt/Train Ea 15 Min 30 2    Total  30 2          Therapy Charges for Today     Code Description Service Date Service Provider Modifiers Qty    [id number] HC OT SELF CARE/MGMT/TRAIN EA 15 MIN 8/12/2019 Zenia Ibarra COTA/L GO 2          OT Discharge Summary  Reason for Discharge: Discharge from facility  Outcomes Achieved: Refer to plan of care for updates on goals achieved  Discharge Destination: [facility]      BETTY Vieira  8/13/2019

## 2019-08-14 NOTE — THERAPY DISCHARGE NOTE
Acute Care - Speech Language Pathology Discharge Summary  [facility]       Patient Name: Karen Snell  : 1945  MRN: [id number]    Today's Date: 2019  Onset of Illness/Injury or Date of Surgery: 08/10/19       Referring Physician: Dr. Bundy        Admit Date: 8/10/2019      SLP Recommendation and Plan  Mechanical soft solids and thin liquids    Visit Dx:    ICD-10-CM ICD-9-CM   1. Symptomatic anemia D64.9 285.9   2. Dysphagia, unspecified type R13.10 787.20   3. Decreased activities of daily living (ADL) R68.89 780.99               SLP GOALS     Row Name 19 1500             Oral Nutrition/Hydration Goal 1 (SLP)    Oral Nutrition/Hydration Goal 1, SLP  Pt will tolerate least restrictive diet with no overt s/s of aspiration.   -MB      Time Frame (Oral Nutrition/Hydration Goal 1, SLP)  by discharge  -MB      Barriers (Oral Nutrition/Hydration Goal 1, SLP)  n/a  -MB      Progress/Outcomes (Oral Nutrition/Hydration Goal 1, SLP)  goal not met  -MB        User Key  (r) = Recorded By, (t) = Taken By, (c) = Cosigned By    Initials Name Provider Type    Aroldo Cloud CCC-SLP Speech and Language Pathologist                  SLP Discharge Summary  Anticipated Dischage Disposition: skilled nursing facility  Reason for Discharge: discharge from this facility  Progress Toward Achieving Short/long Term Goals: goals partially met within established timelines  Discharge Destination: SNF      Aroldo Patel CCC-SLP  2019

## 2019-09-06 ENCOUNTER — HOSPITAL ENCOUNTER (INPATIENT)
Age: 74
LOS: 5 days | Discharge: HOSPICE | DRG: 393 | End: 2019-09-11
Attending: EMERGENCY MEDICINE | Admitting: INTERNAL MEDICINE
Payer: MEDICARE

## 2019-09-06 DIAGNOSIS — N18.3 ACUTE RENAL FAILURE SUPERIMPOSED ON STAGE 3 CHRONIC KIDNEY DISEASE, UNSPECIFIED ACUTE RENAL FAILURE TYPE: ICD-10-CM

## 2019-09-06 DIAGNOSIS — N17.9 ACUTE RENAL FAILURE SUPERIMPOSED ON STAGE 3 CHRONIC KIDNEY DISEASE, UNSPECIFIED ACUTE RENAL FAILURE TYPE: ICD-10-CM

## 2019-09-06 DIAGNOSIS — Z79.01 ON ANTICOAGULANT THERAPY: ICD-10-CM

## 2019-09-06 DIAGNOSIS — D62 ANEMIA ASSOCIATED WITH ACUTE BLOOD LOSS: ICD-10-CM

## 2019-09-06 DIAGNOSIS — K92.2 LOWER GI BLEED: Primary | ICD-10-CM

## 2019-09-06 DIAGNOSIS — E86.0 DEHYDRATION: ICD-10-CM

## 2019-09-06 DIAGNOSIS — D68.9 COAGULOPATHY (HCC): ICD-10-CM

## 2019-09-06 DIAGNOSIS — E87.0 HYPERNATREMIA: ICD-10-CM

## 2019-09-06 DIAGNOSIS — Z86.711 HISTORY OF PULMONARY EMBOLISM: ICD-10-CM

## 2019-09-06 PROBLEM — K62.5 RECTAL BLEED: Status: ACTIVE | Noted: 2019-09-06

## 2019-09-06 LAB
ABO/RH: NORMAL
ALBUMIN SERPL-MCNC: 1.2 G/DL (ref 3.5–5.2)
ALP BLD-CCNC: 263 U/L (ref 35–104)
ALT SERPL-CCNC: 54 U/L (ref 5–33)
ANION GAP SERPL CALCULATED.3IONS-SCNC: 10 MMOL/L (ref 7–19)
ANTIBODY SCREEN: NORMAL
APTT: 47 SEC (ref 26–36.2)
AST SERPL-CCNC: 84 U/L (ref 5–32)
BILIRUB SERPL-MCNC: 0.4 MG/DL (ref 0.2–1.2)
BILIRUBIN DIRECT: 0.2 MG/DL (ref 0–0.3)
BILIRUBIN, INDIRECT: 0.2 MG/DL (ref 0.1–1)
BLOOD BANK DISPENSE STATUS: NORMAL
BLOOD BANK DISPENSE STATUS: NORMAL
BLOOD BANK PRODUCT CODE: NORMAL
BLOOD BANK PRODUCT CODE: NORMAL
BPU ID: NORMAL
BPU ID: NORMAL
BUN BLDV-MCNC: 65 MG/DL (ref 8–23)
CALCIUM SERPL-MCNC: 7.2 MG/DL (ref 8.8–10.2)
CHLORIDE BLD-SCNC: 117 MMOL/L (ref 98–111)
CO2: 27 MMOL/L (ref 22–29)
CREAT SERPL-MCNC: 1.7 MG/DL (ref 0.5–0.9)
DESCRIPTION BLOOD BANK: NORMAL
DESCRIPTION BLOOD BANK: NORMAL
GFR NON-AFRICAN AMERICAN: 29
GLUCOSE BLD-MCNC: 91 MG/DL (ref 74–109)
HCT VFR BLD CALC: 26.8 % (ref 37–47)
HCT VFR BLD CALC: 29.2 % (ref 37–47)
HEMOGLOBIN: 8.5 G/DL (ref 12–16)
HEMOGLOBIN: 9.8 G/DL (ref 12–16)
INR BLD: 4.13 (ref 0.88–1.18)
LIPASE: 17 U/L (ref 13–60)
MCH RBC QN AUTO: 27.4 PG (ref 27–31)
MCHC RBC AUTO-ENTMCNC: 31.7 G/DL (ref 33–37)
MCV RBC AUTO: 86.5 FL (ref 81–99)
PDW BLD-RTO: 17.5 % (ref 11.5–14.5)
PLATELET # BLD: 309 K/UL (ref 130–400)
PMV BLD AUTO: 9.6 FL (ref 9.4–12.3)
POTASSIUM REFLEX MAGNESIUM: 3.7 MMOL/L (ref 3.5–5)
PROTHROMBIN TIME: 39.2 SEC (ref 12–14.6)
RBC # BLD: 3.1 M/UL (ref 4.2–5.4)
SODIUM BLD-SCNC: 154 MMOL/L (ref 136–145)
TOTAL PROTEIN: 5 G/DL (ref 6.6–8.7)
WBC # BLD: 19.3 K/UL (ref 4.8–10.8)

## 2019-09-06 PROCEDURE — 86901 BLOOD TYPING SEROLOGIC RH(D): CPT

## 2019-09-06 PROCEDURE — C9132 KCENTRA, PER I.U.: HCPCS | Performed by: EMERGENCY MEDICINE

## 2019-09-06 PROCEDURE — 83690 ASSAY OF LIPASE: CPT

## 2019-09-06 PROCEDURE — 85014 HEMATOCRIT: CPT

## 2019-09-06 PROCEDURE — 85610 PROTHROMBIN TIME: CPT

## 2019-09-06 PROCEDURE — 85027 COMPLETE CBC AUTOMATED: CPT

## 2019-09-06 PROCEDURE — 6370000000 HC RX 637 (ALT 250 FOR IP): Performed by: INTERNAL MEDICINE

## 2019-09-06 PROCEDURE — 86850 RBC ANTIBODY SCREEN: CPT

## 2019-09-06 PROCEDURE — 99285 EMERGENCY DEPT VISIT HI MDM: CPT

## 2019-09-06 PROCEDURE — 86923 COMPATIBILITY TEST ELECTRIC: CPT

## 2019-09-06 PROCEDURE — 99222 1ST HOSP IP/OBS MODERATE 55: CPT | Performed by: INTERNAL MEDICINE

## 2019-09-06 PROCEDURE — 36430 TRANSFUSION BLD/BLD COMPNT: CPT

## 2019-09-06 PROCEDURE — 80048 BASIC METABOLIC PNL TOTAL CA: CPT

## 2019-09-06 PROCEDURE — 85018 HEMOGLOBIN: CPT

## 2019-09-06 PROCEDURE — P9017 PLASMA 1 DONOR FRZ W/IN 8 HR: HCPCS

## 2019-09-06 PROCEDURE — 86900 BLOOD TYPING SEROLOGIC ABO: CPT

## 2019-09-06 PROCEDURE — 85730 THROMBOPLASTIN TIME PARTIAL: CPT

## 2019-09-06 PROCEDURE — 6360000002 HC RX W HCPCS: Performed by: EMERGENCY MEDICINE

## 2019-09-06 PROCEDURE — 2000000000 HC ICU R&B

## 2019-09-06 PROCEDURE — 2580000003 HC RX 258: Performed by: EMERGENCY MEDICINE

## 2019-09-06 PROCEDURE — 96374 THER/PROPH/DIAG INJ IV PUSH: CPT

## 2019-09-06 PROCEDURE — P9016 RBC LEUKOCYTES REDUCED: HCPCS

## 2019-09-06 PROCEDURE — 36415 COLL VENOUS BLD VENIPUNCTURE: CPT

## 2019-09-06 PROCEDURE — 2580000003 HC RX 258: Performed by: INTERNAL MEDICINE

## 2019-09-06 PROCEDURE — 80076 HEPATIC FUNCTION PANEL: CPT

## 2019-09-06 RX ORDER — DONEPEZIL HYDROCHLORIDE 5 MG/1
10 TABLET, FILM COATED ORAL NIGHTLY
Status: DISCONTINUED | OUTPATIENT
Start: 2019-09-06 | End: 2019-09-08

## 2019-09-06 RX ORDER — PANTOPRAZOLE SODIUM 40 MG/1
40 TABLET, DELAYED RELEASE ORAL
Status: DISCONTINUED | OUTPATIENT
Start: 2019-09-07 | End: 2019-09-08

## 2019-09-06 RX ORDER — CYPROHEPTADINE HYDROCHLORIDE 4 MG/1
4 TABLET ORAL
COMMUNITY
Start: 2019-08-14

## 2019-09-06 RX ORDER — ASPIRIN 81 MG/1
81 TABLET ORAL
COMMUNITY
Start: 2019-06-27

## 2019-09-06 RX ORDER — SODIUM CHLORIDE 0.9 % (FLUSH) 0.9 %
10 SYRINGE (ML) INJECTION EVERY 12 HOURS SCHEDULED
Status: DISCONTINUED | OUTPATIENT
Start: 2019-09-06 | End: 2019-09-09 | Stop reason: SDUPTHER

## 2019-09-06 RX ORDER — ONDANSETRON 2 MG/ML
4 INJECTION INTRAMUSCULAR; INTRAVENOUS EVERY 6 HOURS PRN
Status: DISCONTINUED | OUTPATIENT
Start: 2019-09-06 | End: 2019-09-11 | Stop reason: HOSPADM

## 2019-09-06 RX ORDER — TRAMADOL HYDROCHLORIDE 50 MG/1
50 TABLET ORAL
COMMUNITY
Start: 2019-08-12

## 2019-09-06 RX ORDER — DONEPEZIL HYDROCHLORIDE 10 MG/1
10 TABLET, FILM COATED ORAL
COMMUNITY

## 2019-09-06 RX ORDER — BUPROPION HYDROCHLORIDE 150 MG/1
150 TABLET ORAL
COMMUNITY

## 2019-09-06 RX ORDER — 0.9 % SODIUM CHLORIDE 0.9 %
250 INTRAVENOUS SOLUTION INTRAVENOUS ONCE
Status: DISCONTINUED | OUTPATIENT
Start: 2019-09-06 | End: 2019-09-11 | Stop reason: HOSPADM

## 2019-09-06 RX ORDER — BUPROPION HYDROCHLORIDE 150 MG/1
150 TABLET ORAL DAILY
Status: DISCONTINUED | OUTPATIENT
Start: 2019-09-06 | End: 2019-09-08

## 2019-09-06 RX ORDER — CITALOPRAM 10 MG/1
10 TABLET ORAL DAILY
Status: DISCONTINUED | OUTPATIENT
Start: 2019-09-06 | End: 2019-09-08

## 2019-09-06 RX ORDER — SODIUM CHLORIDE, SODIUM LACTATE, POTASSIUM CHLORIDE, CALCIUM CHLORIDE 600; 310; 30; 20 MG/100ML; MG/100ML; MG/100ML; MG/100ML
1000 INJECTION, SOLUTION INTRAVENOUS ONCE
Status: COMPLETED | OUTPATIENT
Start: 2019-09-06 | End: 2019-09-06

## 2019-09-06 RX ORDER — SODIUM CHLORIDE 450 MG/100ML
INJECTION, SOLUTION INTRAVENOUS CONTINUOUS
Status: DISCONTINUED | OUTPATIENT
Start: 2019-09-06 | End: 2019-09-08

## 2019-09-06 RX ORDER — CITALOPRAM 10 MG/1
10 TABLET ORAL
COMMUNITY

## 2019-09-06 RX ORDER — PHYTONADIONE 5 MG/1
10 TABLET ORAL ONCE
Status: COMPLETED | OUTPATIENT
Start: 2019-09-06 | End: 2019-09-06

## 2019-09-06 RX ORDER — BUMETANIDE 1 MG/1
1 TABLET ORAL
COMMUNITY
Start: 2019-06-26

## 2019-09-06 RX ORDER — PANTOPRAZOLE SODIUM 40 MG/1
40 TABLET, DELAYED RELEASE ORAL
COMMUNITY

## 2019-09-06 RX ORDER — ACETAMINOPHEN 325 MG/1
650 TABLET ORAL EVERY 4 HOURS PRN
Status: DISCONTINUED | OUTPATIENT
Start: 2019-09-06 | End: 2019-09-11 | Stop reason: HOSPADM

## 2019-09-06 RX ORDER — SODIUM CHLORIDE 0.9 % (FLUSH) 0.9 %
10 SYRINGE (ML) INJECTION PRN
Status: DISCONTINUED | OUTPATIENT
Start: 2019-09-06 | End: 2019-09-09 | Stop reason: SDUPTHER

## 2019-09-06 RX ADMIN — SODIUM CHLORIDE, POTASSIUM CHLORIDE, SODIUM LACTATE AND CALCIUM CHLORIDE 1000 ML: 600; 310; 30; 20 INJECTION, SOLUTION INTRAVENOUS at 05:54

## 2019-09-06 RX ADMIN — SODIUM CHLORIDE 150 ML/HR: 4.5 INJECTION, SOLUTION INTRAVENOUS at 10:27

## 2019-09-06 RX ADMIN — CITALOPRAM HYDROBROMIDE 10 MG: 10 TABLET ORAL at 11:32

## 2019-09-06 RX ADMIN — PHYTONADIONE 10 MG: 5 TABLET ORAL at 11:33

## 2019-09-06 RX ADMIN — BUPROPION HYDROCHLORIDE 150 MG: 150 TABLET, FILM COATED, EXTENDED RELEASE ORAL at 11:33

## 2019-09-06 RX ADMIN — SODIUM CHLORIDE: 4.5 INJECTION, SOLUTION INTRAVENOUS at 19:08

## 2019-09-06 RX ADMIN — SODIUM CHLORIDE: 4.5 INJECTION, SOLUTION INTRAVENOUS at 14:34

## 2019-09-06 RX ADMIN — PROTHROMBIN, COAGULATION FACTOR VII HUMAN, COAGULATION FACTOR IX HUMAN, COAGULATION FACTOR X HUMAN, PROTEIN C, PROTEIN S HUMAN, AND WATER 4740 UNITS: KIT at 07:40

## 2019-09-06 NOTE — PROGRESS NOTES
Kristin Alcaraz arrived to room # 153  . Presented with: GI bleed    Mental Status: Patient is oriented. Vitals:    09/06/19 0900   BP: (!) 86/45   Pulse:    Resp: 16   Temp: 98.4 °F (36.9 °C)   SpO2:      Patient safety contract and falls prevention contract reviewed with patient Yes. Oriented Patient to room. Call light within reach. Yes. Needs, issues or concerns expressed at this time: yes, Pt incontinent of maroon stool moderate amt. Skin cleansed and extra protective cream applied. Pt has multiple stage 2s on buttocks and thighs. Hooked to ICU monitors and placed a pure wick due to urinary incontinence also. .      Electronically signed by Tammy Sheikh RN on 9/6/2019 at 10:05 AM

## 2019-09-06 NOTE — PROGRESS NOTES
Notified by lab that three techs had tried to stick her for PT/INR. Dr. Marlen Haley notified and okayed for RT to stick. RT attempted to draw multiple times. Minimal blood drawn and sent to lab but unable to run d/t not having enough blood in the vial.  Dr. Marlen Haley notified. No new orders at this time.

## 2019-09-07 LAB
APTT: 31 SEC (ref 26–36.2)
HCT VFR BLD CALC: 27.5 % (ref 37–47)
HEMOGLOBIN: 8.4 G/DL (ref 12–16)
INR BLD: 1.94 (ref 0.88–1.18)
MCH RBC QN AUTO: 25.8 PG (ref 27–31)
MCHC RBC AUTO-ENTMCNC: 30.5 G/DL (ref 33–37)
MCV RBC AUTO: 84.6 FL (ref 81–99)
PDW BLD-RTO: 22.6 % (ref 11.5–14.5)
PLATELET # BLD: 281 K/UL (ref 130–400)
PMV BLD AUTO: 10.3 FL (ref 9.4–12.3)
PROTHROMBIN TIME: 21.4 SEC (ref 12–14.6)
RBC # BLD: 3.25 M/UL (ref 4.2–5.4)
WBC # BLD: 19.1 K/UL (ref 4.8–10.8)

## 2019-09-07 PROCEDURE — 2580000003 HC RX 258: Performed by: INTERNAL MEDICINE

## 2019-09-07 PROCEDURE — 99233 SBSQ HOSP IP/OBS HIGH 50: CPT | Performed by: INTERNAL MEDICINE

## 2019-09-07 PROCEDURE — G8996 SWALLOW CURRENT STATUS: HCPCS

## 2019-09-07 PROCEDURE — 85027 COMPLETE CBC AUTOMATED: CPT

## 2019-09-07 PROCEDURE — 6370000000 HC RX 637 (ALT 250 FOR IP): Performed by: INTERNAL MEDICINE

## 2019-09-07 PROCEDURE — 85730 THROMBOPLASTIN TIME PARTIAL: CPT

## 2019-09-07 PROCEDURE — 36415 COLL VENOUS BLD VENIPUNCTURE: CPT

## 2019-09-07 PROCEDURE — 92610 EVALUATE SWALLOWING FUNCTION: CPT

## 2019-09-07 PROCEDURE — 6360000002 HC RX W HCPCS: Performed by: INTERNAL MEDICINE

## 2019-09-07 PROCEDURE — 1210000000 HC MED SURG R&B

## 2019-09-07 PROCEDURE — 85610 PROTHROMBIN TIME: CPT

## 2019-09-07 PROCEDURE — G8997 SWALLOW GOAL STATUS: HCPCS

## 2019-09-07 RX ADMIN — Medication 10 ML: at 23:28

## 2019-09-07 RX ADMIN — SODIUM CHLORIDE: 4.5 INJECTION, SOLUTION INTRAVENOUS at 08:35

## 2019-09-07 RX ADMIN — SODIUM CHLORIDE: 4.5 INJECTION, SOLUTION INTRAVENOUS at 01:34

## 2019-09-07 RX ADMIN — IRON SUCROSE 200 MG: 20 INJECTION, SOLUTION INTRAVENOUS at 14:51

## 2019-09-07 ASSESSMENT — PAIN SCALES - GENERAL
PAINLEVEL_OUTOF10: 0
PAINLEVEL_OUTOF10: 0

## 2019-09-07 NOTE — PLAN OF CARE
Problem: Nutrition  Goal: Optimal nutrition therapy  Description  Nutrition Problem: Inadequate oral intake  Intervention: Food and/or Nutrient Delivery: Continue NPO  Nutritional Goals: Meet nutritional needs via po or EN     Outcome: Ongoing

## 2019-09-07 NOTE — PROGRESS NOTES
Brittaney Bolus transferred to 26 from 21  via bedReason for transfer: lower level of care. Explained reason for transfer to Patient. Belongings: Glasses with patient at bedside . Soft chart transferred with patient: Yes. Telemetry box number N/A transferred with patient: N/A. Report given to: Leonela, via telephone.       Electronically signed by Lucero Ratliff RN on 9/7/2019 at 3:32 PM

## 2019-09-07 NOTE — PROGRESS NOTES
Nutrition Assessment    Type and Reason for Visit: Initial, Positive Nutrition Screen    Nutrition Assessment: Pt at risk of nutritional compromise AEB increased nutritional needs r/t skin breakdown. Pt at risk of further compromise r/t NPO status, pt unable to tolerate PO per SLP. Will follow for nutrition progression and implement nutritional intervention as needed. Malnutrition Assessment:  · Malnutrition Status: Insufficient data  · Context: Acute illness or injury  · Findings of the 6 clinical characteristics of malnutrition (Minimum of 2 out of 6 clinical characteristics is required to make the diagnosis of moderate or severe Protein Calorie Malnutrition based on AND/ASPEN Guidelines):  1. Energy Intake-Unable to assess,      2. Weight Loss-Unable to assess,    3. Fat Loss-Unable to assess,    4. Muscle Loss-Unable to assess,    5. Fluid Accumulation-Mild fluid accumulation,    6.   Strength-Not measured    Nutrition Risk Level: High    Nutrient Needs:  · Estimated Daily Total Kcal: 5163-9314(15-18 kcal/kg)  · Estimated Daily Protein (g): (1.5-1.8 g/kg)  · Estimated Daily Total Fluid (ml/day): 3884-3773(20-25 ml/kg)    Nutrition Diagnosis:   · Problem: Inadequate oral intake  · Etiology: related to Difficulty swallowing     Signs and symptoms:  as evidenced by Swallow study results    Objective Information:  · Nutrition-Focused Physical Findings:    · Wound Type: Multiple, Pressure Ulcer, Stage I  · Current Nutrition Therapies:  · Oral Diet Orders: NPO   · Oral Diet intake: NPO  · Anthropometric Measures:  · Ht: 5' 5\" (165.1 cm)   · Admission Body Wt: 210 lb (95.3 kg)  · Ideal Body Wt: 125 lb (56.7 kg),  · BMI Classification: BMI 35.0 - 39.9 Obese Class II    Nutrition Interventions:   Continue NPO  Continued Inpatient Monitoring    Nutrition Evaluation:   · Evaluation: Goals set   · Goals: Meet nutritional needs via po or EN    · Monitoring: Nutrition Progression, Skin Integrity, Weight,

## 2019-09-07 NOTE — PROGRESS NOTES
GI  - PROGRESS NOTE    Subjective:   Admit Date: 9/6/2019  PCP: Roni Castillo MD    CC: Rectal bleeding, external hemorrhoids    Pt seen and examined. PT is resting comfortably in her bed. No further episodes of bleeding, though her hemorrhoids were noted to have some oozing since yesterday. Medications:  Scheduled Meds:   sodium chloride  250 mL Intravenous Once    sodium chloride  250 mL Intravenous Once    buPROPion  150 mg Oral Daily    citalopram  10 mg Oral Daily    donepezil  10 mg Oral Nightly    pantoprazole  40 mg Oral QAM AC    sodium chloride flush  10 mL Intravenous 2 times per day       Continuous Infusions:   sodium chloride 150 mL/hr at 09/07/19 0835       PRN Meds:.sodium chloride flush, acetaminophen, ondansetron    Allergies: Patient has no known allergies. Labs:     Recent Labs     09/06/19  0508 09/06/19  1006 09/07/19  0127   WBC 19.3*  --  19.1*   RBC 3.10*  --  3.25*   HGB 8.5* 9.8* 8.4*   HCT 26.8* 29.2* 27.5*   MCV 86.5  --  84.6   MCH 27.4  --  25.8*   MCHC 31.7*  --  30.5*     --  281     Recent Labs     09/06/19  0508   *   K 3.7   ANIONGAP 10   *   CO2 27   BUN 65*   CREATININE 1.7*   GLUCOSE 91   CALCIUM 7.2*     No results for input(s): MG, PHOS in the last 72 hours. Recent Labs     09/06/19  0508   AST 84*   ALT 54*   BILITOT 0.4   ALKPHOS 263*     HgBA1c:  No components found for: HGBA1C  FLP:  No results found for: TRIG, HDL, LDLCALC, LDLDIRECT, LABVLDL  TSH:  No results found for: TSH  Troponin T: No results for input(s): TROPONINI in the last 72 hours. INR:   Recent Labs     09/06/19  0508 09/07/19  0127   INR 4.13* 1.94*       Recent Labs     09/06/19  0508   LIPASE 17     -----------------------------------------------------------------  RAD:   No results found.     Physical Exam:     Vitals:    09/07/19 0300 09/07/19 0400 09/07/19 0502 09/07/19 0600   BP: (!) 94/44 (!) 106/46 (!) 92/44 (!) 94/42   Pulse: 101 100 87 90   Resp: 13 13 12

## 2019-09-08 ENCOUNTER — APPOINTMENT (OUTPATIENT)
Dept: GENERAL RADIOLOGY | Age: 74
DRG: 393 | End: 2019-09-08
Payer: MEDICARE

## 2019-09-08 LAB
ANION GAP SERPL CALCULATED.3IONS-SCNC: 14 MMOL/L (ref 7–19)
BUN BLDV-MCNC: 50 MG/DL (ref 8–23)
CALCIUM SERPL-MCNC: 7.1 MG/DL (ref 8.8–10.2)
CHLORIDE BLD-SCNC: 116 MMOL/L (ref 98–111)
CO2: 22 MMOL/L (ref 22–29)
CREAT SERPL-MCNC: 1.4 MG/DL (ref 0.5–0.9)
GFR NON-AFRICAN AMERICAN: 37
GLUCOSE BLD-MCNC: 38 MG/DL (ref 74–109)
GLUCOSE BLD-MCNC: 53 MG/DL (ref 70–99)
GLUCOSE BLD-MCNC: 72 MG/DL (ref 70–99)
GLUCOSE BLD-MCNC: 72 MG/DL (ref 70–99)
GLUCOSE BLD-MCNC: 79 MG/DL (ref 70–99)
GLUCOSE BLD-MCNC: 80 MG/DL (ref 70–99)
GLUCOSE BLD-MCNC: 84 MG/DL (ref 70–99)
HCT VFR BLD CALC: 28.3 % (ref 37–47)
HEMOGLOBIN: 8.8 G/DL (ref 12–16)
MCH RBC QN AUTO: 25.7 PG (ref 27–31)
MCHC RBC AUTO-ENTMCNC: 31.1 G/DL (ref 33–37)
MCV RBC AUTO: 82.5 FL (ref 81–99)
PDW BLD-RTO: 21.8 % (ref 11.5–14.5)
PERFORMED ON: ABNORMAL
PERFORMED ON: NORMAL
PLATELET # BLD: 280 K/UL (ref 130–400)
PMV BLD AUTO: 10 FL (ref 9.4–12.3)
POTASSIUM SERPL-SCNC: 3.1 MMOL/L (ref 3.5–5)
RBC # BLD: 3.43 M/UL (ref 4.2–5.4)
SODIUM BLD-SCNC: 152 MMOL/L (ref 136–145)
WBC # BLD: 19.3 K/UL (ref 4.8–10.8)

## 2019-09-08 PROCEDURE — 2580000003 HC RX 258

## 2019-09-08 PROCEDURE — 6360000002 HC RX W HCPCS: Performed by: FAMILY MEDICINE

## 2019-09-08 PROCEDURE — 85027 COMPLETE CBC AUTOMATED: CPT

## 2019-09-08 PROCEDURE — 71045 X-RAY EXAM CHEST 1 VIEW: CPT

## 2019-09-08 PROCEDURE — 2580000003 HC RX 258: Performed by: INTERNAL MEDICINE

## 2019-09-08 PROCEDURE — 71046 X-RAY EXAM CHEST 2 VIEWS: CPT

## 2019-09-08 PROCEDURE — 82948 REAGENT STRIP/BLOOD GLUCOSE: CPT

## 2019-09-08 PROCEDURE — 1210000000 HC MED SURG R&B

## 2019-09-08 PROCEDURE — 80048 BASIC METABOLIC PNL TOTAL CA: CPT

## 2019-09-08 PROCEDURE — 99233 SBSQ HOSP IP/OBS HIGH 50: CPT | Performed by: INTERNAL MEDICINE

## 2019-09-08 PROCEDURE — 2580000003 HC RX 258: Performed by: FAMILY MEDICINE

## 2019-09-08 PROCEDURE — 2580000003 HC RX 258: Performed by: HOSPITALIST

## 2019-09-08 PROCEDURE — C9113 INJ PANTOPRAZOLE SODIUM, VIA: HCPCS | Performed by: INTERNAL MEDICINE

## 2019-09-08 PROCEDURE — 6360000002 HC RX W HCPCS: Performed by: INTERNAL MEDICINE

## 2019-09-08 PROCEDURE — 36415 COLL VENOUS BLD VENIPUNCTURE: CPT

## 2019-09-08 RX ORDER — NICOTINE POLACRILEX 4 MG
15 LOZENGE BUCCAL PRN
Status: DISCONTINUED | OUTPATIENT
Start: 2019-09-08 | End: 2019-09-11 | Stop reason: HOSPADM

## 2019-09-08 RX ORDER — DEXTROSE MONOHYDRATE 25 G/50ML
12.5 INJECTION, SOLUTION INTRAVENOUS PRN
Status: DISCONTINUED | OUTPATIENT
Start: 2019-09-08 | End: 2019-09-11 | Stop reason: HOSPADM

## 2019-09-08 RX ORDER — DEXTROSE MONOHYDRATE 25 G/50ML
INJECTION, SOLUTION INTRAVENOUS
Status: DISPENSED
Start: 2019-09-08 | End: 2019-09-08

## 2019-09-08 RX ORDER — PANTOPRAZOLE SODIUM 40 MG/10ML
40 INJECTION, POWDER, LYOPHILIZED, FOR SOLUTION INTRAVENOUS DAILY
Status: DISCONTINUED | OUTPATIENT
Start: 2019-09-08 | End: 2019-09-11 | Stop reason: HOSPADM

## 2019-09-08 RX ORDER — DEXTROSE MONOHYDRATE 25 G/50ML
INJECTION, SOLUTION INTRAVENOUS
Status: COMPLETED
Start: 2019-09-08 | End: 2019-09-08

## 2019-09-08 RX ORDER — DEXTROSE MONOHYDRATE 50 MG/ML
100 INJECTION, SOLUTION INTRAVENOUS PRN
Status: DISCONTINUED | OUTPATIENT
Start: 2019-09-08 | End: 2019-09-11 | Stop reason: HOSPADM

## 2019-09-08 RX ORDER — 0.9 % SODIUM CHLORIDE 0.9 %
10 VIAL (ML) INJECTION DAILY
Status: DISCONTINUED | OUTPATIENT
Start: 2019-09-08 | End: 2019-09-11 | Stop reason: HOSPADM

## 2019-09-08 RX ORDER — SODIUM CHLORIDE, SODIUM LACTATE, POTASSIUM CHLORIDE, CALCIUM CHLORIDE 600; 310; 30; 20 MG/100ML; MG/100ML; MG/100ML; MG/100ML
INJECTION, SOLUTION INTRAVENOUS CONTINUOUS
Status: ACTIVE | OUTPATIENT
Start: 2019-09-08 | End: 2019-09-08

## 2019-09-08 RX ADMIN — IRON SUCROSE 200 MG: 20 INJECTION, SOLUTION INTRAVENOUS at 12:58

## 2019-09-08 RX ADMIN — Medication 10 ML: at 12:58

## 2019-09-08 RX ADMIN — DEXTROSE MONOHYDRATE 100 ML/HR: 50 INJECTION, SOLUTION INTRAVENOUS at 05:14

## 2019-09-08 RX ADMIN — SODIUM CHLORIDE, POTASSIUM CHLORIDE, SODIUM LACTATE AND CALCIUM CHLORIDE: 600; 310; 30; 20 INJECTION, SOLUTION INTRAVENOUS at 10:46

## 2019-09-08 RX ADMIN — DEXTROSE MONOHYDRATE 12.5 G: 25 INJECTION, SOLUTION INTRAVENOUS at 04:56

## 2019-09-08 RX ADMIN — DEXTROSE 50 % IN WATER (D50W) INTRAVENOUS SYRINGE 12.5 G: at 04:56

## 2019-09-08 RX ADMIN — PANTOPRAZOLE SODIUM 40 MG: 40 INJECTION, POWDER, FOR SOLUTION INTRAVENOUS at 12:58

## 2019-09-08 RX ADMIN — POTASSIUM CHLORIDE: 2 INJECTION, SOLUTION, CONCENTRATE INTRAVENOUS at 12:58

## 2019-09-08 RX ADMIN — PIPERACILLIN AND TAZOBACTAM 3.38 G: 3; .375 INJECTION, POWDER, LYOPHILIZED, FOR SOLUTION INTRAVENOUS at 22:19

## 2019-09-08 RX ADMIN — Medication 10 ML: at 22:20

## 2019-09-08 NOTE — PROGRESS NOTES
GI  - PROGRESS NOTE    Subjective:   Admit Date: 9/6/2019  PCP: Doris Alarcon MD    CC: Rectal bleeding, external hemorrhoids    Pt seen and examined. Pt is resting comfortably in her bed with her son at bedside. No hematemesis, coffee ground emesis, rectal bleeding since yesterday. Medications:  Scheduled Meds:   dextrose        pantoprazole  40 mg Intravenous Daily    And    sodium chloride (PF)  10 mL Intravenous Daily    iron sucrose  200 mg Intravenous Q24H    sodium chloride  250 mL Intravenous Once    sodium chloride  250 mL Intravenous Once    sodium chloride flush  10 mL Intravenous 2 times per day       Continuous Infusions:   dextrose 100 mL/hr (09/08/19 0514)    IV infusion builder      lactated ringers         PRN Meds:.glucose, dextrose, glucagon (rDNA), dextrose, sodium chloride flush, acetaminophen, ondansetron    Allergies: Patient has no known allergies. Labs:     Recent Labs     09/06/19  0508 09/06/19  1006 09/07/19  0127 09/08/19  0242   WBC 19.3*  --  19.1* 19.3*   RBC 3.10*  --  3.25* 3.43*   HGB 8.5* 9.8* 8.4* 8.8*   HCT 26.8* 29.2* 27.5* 28.3*   MCV 86.5  --  84.6 82.5   MCH 27.4  --  25.8* 25.7*   MCHC 31.7*  --  30.5* 31.1*     --  281 280     Recent Labs     09/06/19  0508 09/08/19  0242   * 152*   K 3.7 3.1*   ANIONGAP 10 14   * 116*   CO2 27 22   BUN 65* 50*   CREATININE 1.7* 1.4*   GLUCOSE 91 38*   CALCIUM 7.2* 7.1*     No results for input(s): MG, PHOS in the last 72 hours. Recent Labs     09/06/19 0508   AST 84*   ALT 54*   BILITOT 0.4   ALKPHOS 263*     HgBA1c:  No components found for: HGBA1C  FLP:  No results found for: TRIG, HDL, LDLCALC, LDLDIRECT, LABVLDL  TSH:  No results found for: TSH  Troponin T: No results for input(s): TROPONINI in the last 72 hours.   INR:   Recent Labs     09/06/19  0508 09/07/19  0127   INR 4.13* 1.94*       Recent Labs     09/06/19  0508   LIPASE 17 -----------------------------------------------------------------  RAD:   No results found. Physical Exam:     Vitals:    09/07/19 1921 09/07/19 2244 09/08/19 0247 09/08/19 0715   BP: 98/60 (!) 96/54 (!) 96/52 (!) 88/56   Pulse: 93 91 87 92   Resp: 16 16 18 16   Temp: 98.1 °F (36.7 °C) 98.2 °F (36.8 °C) 97.9 °F (36.6 °C) 98.1 °F (36.7 °C)   TempSrc: Temporal Temporal Temporal Temporal   SpO2: 98% 95% 95% 95%   Weight:       Height:         24HR INTAKE/OUTPUT:      Intake/Output Summary (Last 24 hours) at 9/8/2019 1011  Last data filed at 9/8/2019 0848  Gross per 24 hour   Intake 794.95 ml   Output 250 ml   Net 544.95 ml     Constitutional: [x] NAD, [x] of stated age, [x] obese  Eyes: [x] conjunctiva clear, [x] Non inflamed irises, [x] no scleral icterus  ENT/Mouth: [x]  Nares patent with pink mucosa, [x] oropharynx clear without exudates or erythema, [x] hearing grossly normal  Head/Neck: [x] symmetrical, [x] supple  Lungs: [x] respirations non labored with good effort, [x] no respiratory distress,  [x] no wheezing, [x]  Equal air entry bilaterally  Heart: [x] normal S1S2, [x]  Regular rate, [x] pedal pulses preserved 2/4 bilaterally  Abdomen: [x] +BSx4, [x] NTND, [x] soft, [x] no guarding, [x] no peritoneal signs, +external hemorrhoids  Muscuoskeletal: [x]  Normal nails and digits bilaterally, [x] no muscle atrophy, no joint enlargement  Skin/SubQ: [x] No jaundice, [x] warm, dry skin, [x] no masses or rashes on inspection  Psychiatric: [x]  Orientated to person; [x] mood and affect unremarkable, [x] memory recent and remote poor, pleasant      Impression:       1. Rectal bleeding  2. Supra therapeutic INR  3. Anemia  4. External hemorrhoids  5. Dysphagia    Plan:   - Pt is resting comfortably in her bed and conversing. No current evidence of active GI bleeding. Spoke with the patient's son at length at bedside.  They will discuss amongst the family about the long term wishes with respect to feeding tube

## 2019-09-08 NOTE — PROGRESS NOTES
Lori Riggs, DO        acetaminophen (TYLENOL) tablet 650 mg  650 mg Oral Q4H PRN Ted , DO        ondansetron [facility]) injection 4 mg  4 mg Intravenous Q6H PRN Ted , DO           Past Medical History:  Past Medical History:   Diagnosis Date    Anemia     CHF (congestive heart failure) (HCC)     Chronic pruritus     Colon polyps     Depression     DVT (deep venous thrombosis) (HCC)     Failure to thrive in adult     Hypokalemia     Muscle weakness     Pressure ulcer     Pulmonary embolism (HCC)     Shortness of breath     Venous thrombosis        Past Surgical History:  No past surgical history on file. Family History  No family history on file.     Social History  Social History     Socioeconomic History    Marital status:      Spouse name: Not on file    Number of children: Not on file    Years of education: Not on file    Highest education level: Not on file   Occupational History    Not on file   Social Needs    Financial resource strain: Not on file    Food insecurity:     Worry: Not on file     Inability: Not on file    Transportation needs:     Medical: Not on file     Non-medical: Not on file   Tobacco Use    Smoking status: Former Smoker   Substance and Sexual Activity    Alcohol use: Not on file    Drug use: Not on file    Sexual activity: Not on file   Lifestyle    Physical activity:     Days per week: Not on file     Minutes per session: Not on file    Stress: Not on file   Relationships    Social connections:     Talks on phone: Not on file     Gets together: Not on file     Attends Gnosticist service: Not on file     Active member of club or organization: Not on file     Attends meetings of clubs or organizations: Not on file     Relationship status: Not on file    Intimate partner violence:     Fear of current or ex partner: Not on file     Emotionally abused: Not on file     Physically abused: Not on file     Forced sexual activity:

## 2019-09-09 ENCOUNTER — APPOINTMENT (OUTPATIENT)
Dept: GENERAL RADIOLOGY | Age: 74
DRG: 393 | End: 2019-09-09
Payer: MEDICARE

## 2019-09-09 PROBLEM — Z51.5 PALLIATIVE CARE PATIENT: Status: ACTIVE | Noted: 2019-09-09

## 2019-09-09 LAB
GLUCOSE BLD-MCNC: 100 MG/DL (ref 70–99)
GLUCOSE BLD-MCNC: 73 MG/DL (ref 70–99)
GLUCOSE BLD-MCNC: 76 MG/DL (ref 70–99)
GLUCOSE BLD-MCNC: 77 MG/DL (ref 70–99)
GLUCOSE BLD-MCNC: 88 MG/DL (ref 70–99)
PERFORMED ON: ABNORMAL
PERFORMED ON: NORMAL

## 2019-09-09 PROCEDURE — C9113 INJ PANTOPRAZOLE SODIUM, VIA: HCPCS | Performed by: INTERNAL MEDICINE

## 2019-09-09 PROCEDURE — 6360000002 HC RX W HCPCS: Performed by: INTERNAL MEDICINE

## 2019-09-09 PROCEDURE — 2580000003 HC RX 258: Performed by: FAMILY MEDICINE

## 2019-09-09 PROCEDURE — 92526 ORAL FUNCTION THERAPY: CPT

## 2019-09-09 PROCEDURE — 82948 REAGENT STRIP/BLOOD GLUCOSE: CPT

## 2019-09-09 PROCEDURE — 6360000002 HC RX W HCPCS: Performed by: FAMILY MEDICINE

## 2019-09-09 PROCEDURE — 99233 SBSQ HOSP IP/OBS HIGH 50: CPT | Performed by: INTERNAL MEDICINE

## 2019-09-09 PROCEDURE — 74018 RADEX ABDOMEN 1 VIEW: CPT

## 2019-09-09 PROCEDURE — 1210000000 HC MED SURG R&B

## 2019-09-09 PROCEDURE — 2580000003 HC RX 258: Performed by: INTERNAL MEDICINE

## 2019-09-09 RX ORDER — SODIUM CHLORIDE 0.9 % (FLUSH) 0.9 %
10 SYRINGE (ML) INJECTION PRN
Status: DISCONTINUED | OUTPATIENT
Start: 2019-09-09 | End: 2019-09-11 | Stop reason: HOSPADM

## 2019-09-09 RX ORDER — LIDOCAINE HYDROCHLORIDE 10 MG/ML
5 INJECTION, SOLUTION EPIDURAL; INFILTRATION; INTRACAUDAL; PERINEURAL ONCE
Status: DISCONTINUED | OUTPATIENT
Start: 2019-09-09 | End: 2019-09-11 | Stop reason: HOSPADM

## 2019-09-09 RX ORDER — SODIUM CHLORIDE 0.9 % (FLUSH) 0.9 %
10 SYRINGE (ML) INJECTION EVERY 12 HOURS SCHEDULED
Status: DISCONTINUED | OUTPATIENT
Start: 2019-09-09 | End: 2019-09-11 | Stop reason: HOSPADM

## 2019-09-09 RX ADMIN — Medication 10 ML: at 09:14

## 2019-09-09 RX ADMIN — PIPERACILLIN AND TAZOBACTAM 3.38 G: 3; .375 INJECTION, POWDER, LYOPHILIZED, FOR SOLUTION INTRAVENOUS at 14:45

## 2019-09-09 RX ADMIN — PANTOPRAZOLE SODIUM 40 MG: 40 INJECTION, POWDER, FOR SOLUTION INTRAVENOUS at 09:14

## 2019-09-09 RX ADMIN — Medication 10 ML: at 22:40

## 2019-09-09 RX ADMIN — IRON SUCROSE 200 MG: 20 INJECTION, SOLUTION INTRAVENOUS at 22:38

## 2019-09-09 RX ADMIN — PIPERACILLIN AND TAZOBACTAM 3.38 G: 3; .375 INJECTION, POWDER, LYOPHILIZED, FOR SOLUTION INTRAVENOUS at 23:49

## 2019-09-09 RX ADMIN — PIPERACILLIN AND TAZOBACTAM 3.38 G: 3; .375 INJECTION, POWDER, LYOPHILIZED, FOR SOLUTION INTRAVENOUS at 04:30

## 2019-09-09 RX ADMIN — POTASSIUM CHLORIDE: 2 INJECTION, SOLUTION, CONCENTRATE INTRAVENOUS at 14:46

## 2019-09-09 ASSESSMENT — ENCOUNTER SYMPTOMS
SINUS PAIN: 0
NAUSEA: 0
RHINORRHEA: 0
COUGH: 1
BLOOD IN STOOL: 1
WHEEZING: 0
FACIAL SWELLING: 0
VOMITING: 0
SINUS PRESSURE: 0
EYE REDNESS: 0
DIARRHEA: 0

## 2019-09-09 NOTE — PROGRESS NOTES
Minutes per session: Not on file    Stress: Not on file   Relationships    Social connections:     Talks on phone: Not on file     Gets together: Not on file     Attends Episcopal service: Not on file     Active member of club or organization: Not on file     Attends meetings of clubs or organizations: Not on file     Relationship status: Not on file    Intimate partner violence:     Fear of current or ex partner: Not on file     Emotionally abused: Not on file     Physically abused: Not on file     Forced sexual activity: Not on file   Other Topics Concern    Not on file   Social History Narrative    Not on file         Review of Systems:    Review of Systems   Constitutional: Negative for fatigue and fever. HENT: Negative for facial swelling, rhinorrhea, sinus pressure and sinus pain. Eyes: Negative for redness and visual disturbance. Respiratory: Positive for cough (productive ). Negative for wheezing. Cardiovascular: Negative for chest pain and palpitations. Gastrointestinal: Positive for blood in stool. Negative for diarrhea, nausea and vomiting. Musculoskeletal: Negative for neck pain and neck stiffness. Skin: Positive for rash and wound. Neurological: Negative for light-headedness and headaches. Psychiatric/Behavioral: Negative for agitation. Objective:  Blood pressure (!) 92/54, pulse 97, temperature 98.1 °F (36.7 °C), temperature source Temporal, resp. rate 16, height 5' 5\" (1.651 m), weight 210 lb 3.2 oz (95.3 kg), SpO2 98 %. Intake/Output Summary (Last 24 hours) at 9/9/2019 0955  Last data filed at 9/9/2019 0630  Gross per 24 hour   Intake 2608.65 ml   Output 200 ml   Net 2408.65 ml       Physical Exam   Constitutional: She appears well-developed and well-nourished. HENT:   Head: Normocephalic and atraumatic. Mouth/Throat: Oropharynx is clear and moist.   Eyes: Pupils are equal, round, and reactive to light. Conjunctivae are normal.   Neck: No JVD present. Cardiovascular: Normal rate, regular rhythm and normal heart sounds. Pulmonary/Chest: Effort normal and breath sounds normal.   Abdominal: Soft. Musculoskeletal: She exhibits no edema. Neurological: She is alert. Skin: Skin is warm and dry. Bilateral heel ulcers noted, currently in soft booties. Left medial thigh wound bandaged   Vitals reviewed. Labs:  BMP:   Recent Labs     09/08/19 0242   *   K 3.1*   *   CO2 22   BUN 50*   CREATININE 1.4*   CALCIUM 7.1*     CBC:   Recent Labs     09/06/19  1006 09/07/19  0127 09/08/19 0242   WBC  --  19.1* 19.3*   HGB 9.8* 8.4* 8.8*   HCT 29.2* 27.5* 28.3*   MCV  --  84.6 82.5   PLT  --  281 280     LIVER PROFILE:   No results for input(s): AST, ALT, LIPASE, BILIDIR, BILITOT, ALKPHOS in the last 72 hours. Invalid input(s): AMYLASE,  ALB  PT/INR:   Recent Labs     09/07/19 0127   PROTIME 21.4*   INR 1.94*     APTT:   Recent Labs     09/07/19 0127   APTT 31.0     BNP:  No results for input(s): BNP in the last 72 hours. Ionized Calcium:No results for input(s): IONCA in the last 72 hours. Magnesium:No results for input(s): MG in the last 72 hours. Phosphorus:No results for input(s): PHOS in the last 72 hours. HgbA1C: No results for input(s): LABA1C in the last 72 hours. Hepatic:   No results for input(s): ALKPHOS, ALT, AST, PROT, BILITOT, BILIDIR, LABALBU in the last 72 hours. Lactic Acid: No results for input(s): LACTA in the last 72 hours. Troponin: No results for input(s): CKTOTAL, CKMB, TROPONINT in the last 72 hours. ABGs: No results for input(s): PH, PCO2, PO2, HCO3, O2SAT in the last 72 hours. CRP:  No results for input(s): CRP in the last 72 hours. Sed Rate:  No results for input(s): SEDRATE in the last 72 hours. Cultures:   No results for input(s): CULTURE in the last 72 hours. No results for input(s): BC, Doc Minium in the last 72 hours. No results for input(s): CXSURG in the last 72 hours.     Radiology reports as per

## 2019-09-09 NOTE — CONSULTS
Palliative Care:    Pt sent from [facility] with rectal bleeding x 3 days. Attempted call to pt son, number listed is discontinued. Will try to see son in pt room tomorrow. Past Medical History:        Past Medical History:   Diagnosis Date    Anemia     CHF (congestive heart failure) ([address]     Chronic pruritus     Colon polyps     Depression     [facility]) ([facility])     Failure to thrive in adult     Hypokalemia     Muscle weakness     Palliative care patient 09/09/2019    Pressure ulcer     Pulmonary embolism (HCC)     Shortness of breath     Venous thrombosis        Advance Directives:   Pt is DNR-CC no AD on file. Pain/Other Symptoms:  Pt has chronic shoulder pain per nurse. Unable to give pain meds d/t \"BP low\". Tylenol PO is ordered, however, pt having difficulty managing secretions at this time and pt is NPO. Spoke with nurse, pt is \"still passing rectal clots\" so most likely unable to haveTylenol supp, but will ask MD.      Activity:  As soumya. Pt will be moved to specialty mattress d/t multiple wounds on inner thighs, bilat heels, buttocks. Psychological/Spiritual:  Pt has Ipad, asked if I would place it in her reach. No family present at this time. Attempted call to son, number disconnected. Unable to assess spiritual care. Patient/Family Discussion: Per conversation with nurse, pt is cleared for PICC line to initiate TPN for nutrition. Hopeful for peg tube placement later this week per MD notes. When I saw pt she was spitting out large amounts of secretions into kleenex. Unable to have conversation with me. Asked nurse if she thought providing suction would benefit pt. Pt has hx of CHF, Dementia. No living will on file. .  Asked nurse to look in soft chart, no AD from NF. Plan: ABX, wound care, labs, PICC line, supportive care           Palliative care following for support and goals of care.                 Electronically signed by Joshua Bolivar
Recent Labs     09/06/19  0508   *   K 3.7   ANIONGAP 10   *   CO2 27   BUN 65*   CREATININE 1.7*   GLUCOSE 91   CALCIUM 7.2*     No results for input(s): MG, PHOS in the last 72 hours. Recent Labs     09/06/19  0508   AST 84*   ALT 54*   BILITOT 0.4   ALKPHOS 263*     HgBA1c:  No components found for: HGBA1C  FLP:  No results found for: TRIG, HDL, LDLCALC, LDLDIRECT, LABVLDL  TSH:  No results found for: TSH  Troponin T: No results for input(s): TROPONINI in the last 72 hours. INR:   Recent Labs     09/06/19  0508   INR 4.13*       Recent Labs     09/06/19 0508   LIPASE 17       Radiology:  No results found. Assessment:  1. Rectal bleeding  2. Supra therapeutic INR  3. Anemia    Plan:  - Pt experienced some rectal bleeding more consistent with lower GI bleeding. Her INR was >4 and she is currently being reversed. Will hold Xarelto. Spoke with the family at bedside whom stated that the patient expressed in the past that she did not want intubation, feeding tube, or life support measures which is why she is DNR. The patient responded well to 1 unit of PRBC from 8.5 to 9.8. She is resting in her bed in the ICU. At this time, the family has agreed to medical management and close monitoring.  Will repeat INR after reversal.    Lotus Smith DO

## 2019-09-09 NOTE — PROGRESS NOTES
eating well per her family. Hopefully as she gains strength she can resume some po intake even if it is just for pleasure. Impressions:  Absent swallow responses were noted. She is not recommended for po intake at this time. Strict oral hygiene is critical to prevent aspiration of bacteria laden secretions. Recommend NPO status and speech will return to re-assess swallow function and determine if any oral intake will be possible. Patient/Family/Caregiver Education:  Skilled education provided to family regarding swallowing anatomy and physiology, PEG tube, NPO status, eventual po intake for pleasure if her swallowing improves, and importance of oral hygiene. General  Chart Reviewed: Yes  Behavior/Cognition: Alert; Cooperative; Requires cueing  Respiratory Status: Room air  O2 Device: None (Room air)  Communication Observation: Functional  Follows Directions: Simple  Dentition: Adequate  Patient Positioning: Upright in bed  Baseline Vocal Quality: Weak  Prior Dysphagia History:  Family reports very little intake at [place]. Consistencies Administered: Dysphagia Pureed (Dysphagia I); Ice Chips; Thin - teaspoon;Nectar - teaspoon    Recommended Diet and Intervention  Diet Solids Recommendation: NPO  Liquid Consistency Recommendation: NPO  Recommended Form of Meds: Via alternative means of nutrition  Recommendations: NPO  Therapeutic Interventions: Laryngeal exercises; Patient/Family education; Therapeutic PO trials with SLP;Oral care;Pharyngeal exercises        Treatment/Goals  Short-term Goals  Timeframe for Short-term Goals: 4-5 days  Goal 1: Pt to be NPO at this time due to high risk for aspiration. Goal 2: Pt/family/staff education on swallow recommendations. Goal 3: Oral care to be provided by staff. Goal 4: Re-assess swallow function for possible diet upgrade.       Plan:    Continued daily Dysphagia treatment with goals per plan of care.           Electronically Signed By:  Katy Kang M.S.

## 2019-09-10 LAB
ALBUMIN SERPL-MCNC: 1.3 G/DL (ref 3.5–5.2)
ALP BLD-CCNC: 281 U/L (ref 35–104)
ALT SERPL-CCNC: 58 U/L (ref 5–33)
ANION GAP SERPL CALCULATED.3IONS-SCNC: 10 MMOL/L (ref 7–19)
ANION GAP SERPL CALCULATED.3IONS-SCNC: 9 MMOL/L (ref 7–19)
AST SERPL-CCNC: 94 U/L (ref 5–32)
BASOPHILS ABSOLUTE: 0.1 K/UL (ref 0–0.2)
BASOPHILS RELATIVE PERCENT: 0.4 % (ref 0–1)
BILIRUB SERPL-MCNC: 0.6 MG/DL (ref 0.2–1.2)
BUN BLDV-MCNC: 39 MG/DL (ref 8–23)
BUN BLDV-MCNC: 41 MG/DL (ref 8–23)
CALCIUM SERPL-MCNC: 7 MG/DL (ref 8.8–10.2)
CALCIUM SERPL-MCNC: 7.2 MG/DL (ref 8.8–10.2)
CHLORIDE BLD-SCNC: 118 MMOL/L (ref 98–111)
CHLORIDE BLD-SCNC: 118 MMOL/L (ref 98–111)
CO2: 23 MMOL/L (ref 22–29)
CO2: 24 MMOL/L (ref 22–29)
CREAT SERPL-MCNC: 1.2 MG/DL (ref 0.5–0.9)
CREAT SERPL-MCNC: 1.2 MG/DL (ref 0.5–0.9)
EOSINOPHILS ABSOLUTE: 0.3 K/UL (ref 0–0.6)
EOSINOPHILS RELATIVE PERCENT: 1.2 % (ref 0–5)
FERRITIN: >2000 NG/ML (ref 13–150)
GFR NON-AFRICAN AMERICAN: 44
GFR NON-AFRICAN AMERICAN: 44
GLUCOSE BLD-MCNC: 113 MG/DL (ref 70–99)
GLUCOSE BLD-MCNC: 146 MG/DL (ref 70–99)
GLUCOSE BLD-MCNC: 72 MG/DL (ref 74–109)
GLUCOSE BLD-MCNC: 78 MG/DL (ref 70–99)
GLUCOSE BLD-MCNC: 83 MG/DL (ref 74–109)
GLUCOSE BLD-MCNC: 91 MG/DL (ref 70–99)
GLUCOSE BLD-MCNC: 99 MG/DL (ref 70–99)
HCT VFR BLD CALC: 25.9 % (ref 37–47)
HCT VFR BLD CALC: 27.6 % (ref 37–47)
HEMOGLOBIN: 8.3 G/DL (ref 12–16)
HEMOGLOBIN: 9 G/DL (ref 12–16)
HEMOGLOBIN: 9.1 G/DL (ref 12–16)
IMMATURE GRANULOCYTES #: 1.6 K/UL
IRON SATURATION: 86 % (ref 14–50)
IRON: 36 UG/DL (ref 37–145)
LYMPHOCYTES ABSOLUTE: 2.7 K/UL (ref 1.1–4.5)
LYMPHOCYTES RELATIVE PERCENT: 12.9 % (ref 20–40)
MAGNESIUM: 2.1 MG/DL (ref 1.6–2.4)
MAGNESIUM: 2.1 MG/DL (ref 1.6–2.4)
MCH RBC QN AUTO: 25.9 PG (ref 27–31)
MCH RBC QN AUTO: 25.9 PG (ref 27–31)
MCHC RBC AUTO-ENTMCNC: 32 G/DL (ref 33–37)
MCHC RBC AUTO-ENTMCNC: 32.6 G/DL (ref 33–37)
MCV RBC AUTO: 79.3 FL (ref 81–99)
MCV RBC AUTO: 80.9 FL (ref 81–99)
MONOCYTES ABSOLUTE: 0.9 K/UL (ref 0–0.9)
MONOCYTES RELATIVE PERCENT: 4.4 % (ref 0–10)
NEUTROPHILS ABSOLUTE: 15.7 K/UL (ref 1.5–7.5)
NEUTROPHILS RELATIVE PERCENT: 73.8 % (ref 50–65)
PDW BLD-RTO: 21.2 % (ref 11.5–14.5)
PDW BLD-RTO: 21.3 % (ref 11.5–14.5)
PERFORMED ON: ABNORMAL
PERFORMED ON: ABNORMAL
PERFORMED ON: NORMAL
PLATELET # BLD: 243 K/UL (ref 130–400)
PLATELET # BLD: 248 K/UL (ref 130–400)
PMV BLD AUTO: 9.2 FL (ref 9.4–12.3)
PMV BLD AUTO: 9.7 FL (ref 9.4–12.3)
POTASSIUM REFLEX MAGNESIUM: 3.3 MMOL/L (ref 3.5–5)
POTASSIUM SERPL-SCNC: 4.1 MMOL/L (ref 3.5–5)
RBC # BLD: 3.2 M/UL (ref 4.2–5.4)
RBC # BLD: 3.48 M/UL (ref 4.2–5.4)
SODIUM BLD-SCNC: 151 MMOL/L (ref 136–145)
SODIUM BLD-SCNC: 151 MMOL/L (ref 136–145)
TOTAL IRON BINDING CAPACITY: 42 UG/DL (ref 250–400)
TOTAL PROTEIN: 4.7 G/DL (ref 6.6–8.7)
WBC # BLD: 19.9 K/UL (ref 4.8–10.8)
WBC # BLD: 21.3 K/UL (ref 4.8–10.8)

## 2019-09-10 PROCEDURE — 6360000002 HC RX W HCPCS: Performed by: INTERNAL MEDICINE

## 2019-09-10 PROCEDURE — 82728 ASSAY OF FERRITIN: CPT

## 2019-09-10 PROCEDURE — 82948 REAGENT STRIP/BLOOD GLUCOSE: CPT

## 2019-09-10 PROCEDURE — 36415 COLL VENOUS BLD VENIPUNCTURE: CPT

## 2019-09-10 PROCEDURE — 83735 ASSAY OF MAGNESIUM: CPT

## 2019-09-10 PROCEDURE — 85027 COMPLETE CBC AUTOMATED: CPT

## 2019-09-10 PROCEDURE — 83540 ASSAY OF IRON: CPT

## 2019-09-10 PROCEDURE — 6370000000 HC RX 637 (ALT 250 FOR IP): Performed by: INTERNAL MEDICINE

## 2019-09-10 PROCEDURE — 2580000003 HC RX 258: Performed by: HOSPITALIST

## 2019-09-10 PROCEDURE — 2580000003 HC RX 258: Performed by: INTERNAL MEDICINE

## 2019-09-10 PROCEDURE — 99233 SBSQ HOSP IP/OBS HIGH 50: CPT | Performed by: INTERNAL MEDICINE

## 2019-09-10 PROCEDURE — 83550 IRON BINDING TEST: CPT

## 2019-09-10 PROCEDURE — 6360000002 HC RX W HCPCS: Performed by: FAMILY MEDICINE

## 2019-09-10 PROCEDURE — C9113 INJ PANTOPRAZOLE SODIUM, VIA: HCPCS | Performed by: INTERNAL MEDICINE

## 2019-09-10 PROCEDURE — 1210000000 HC MED SURG R&B

## 2019-09-10 PROCEDURE — 6360000002 HC RX W HCPCS: Performed by: HOSPITALIST

## 2019-09-10 PROCEDURE — 2580000003 HC RX 258: Performed by: FAMILY MEDICINE

## 2019-09-10 PROCEDURE — 85018 HEMOGLOBIN: CPT

## 2019-09-10 PROCEDURE — 80053 COMPREHEN METABOLIC PANEL: CPT

## 2019-09-10 PROCEDURE — 85025 COMPLETE CBC W/AUTO DIFF WBC: CPT

## 2019-09-10 RX ORDER — MAGNESIUM SULFATE 1 G/100ML
1 INJECTION INTRAVENOUS PRN
Status: DISCONTINUED | OUTPATIENT
Start: 2019-09-10 | End: 2019-09-11 | Stop reason: HOSPADM

## 2019-09-10 RX ORDER — POTASSIUM CHLORIDE 20 MEQ/1
40 TABLET, EXTENDED RELEASE ORAL PRN
Status: DISCONTINUED | OUTPATIENT
Start: 2019-09-10 | End: 2019-09-11 | Stop reason: HOSPADM

## 2019-09-10 RX ORDER — POTASSIUM CHLORIDE 7.45 MG/ML
10 INJECTION INTRAVENOUS PRN
Status: DISCONTINUED | OUTPATIENT
Start: 2019-09-10 | End: 2019-09-11 | Stop reason: HOSPADM

## 2019-09-10 RX ADMIN — PIPERACILLIN AND TAZOBACTAM 3.38 G: 3; .375 INJECTION, POWDER, LYOPHILIZED, FOR SOLUTION INTRAVENOUS at 16:11

## 2019-09-10 RX ADMIN — ACETAMINOPHEN 650 MG: 325 TABLET ORAL at 12:22

## 2019-09-10 RX ADMIN — IRON SUCROSE 200 MG: 20 INJECTION, SOLUTION INTRAVENOUS at 12:23

## 2019-09-10 RX ADMIN — CALCIUM GLUCONATE 1 G: 98 INJECTION, SOLUTION INTRAVENOUS at 07:36

## 2019-09-10 RX ADMIN — Medication 10 ML: at 20:31

## 2019-09-10 RX ADMIN — Medication 10 ML: at 09:12

## 2019-09-10 RX ADMIN — Medication 10 ML: at 09:11

## 2019-09-10 RX ADMIN — PANTOPRAZOLE SODIUM 40 MG: 40 INJECTION, POWDER, FOR SOLUTION INTRAVENOUS at 09:11

## 2019-09-10 RX ADMIN — POTASSIUM CHLORIDE: 2 INJECTION, SOLUTION, CONCENTRATE INTRAVENOUS at 18:14

## 2019-09-10 RX ADMIN — PIPERACILLIN AND TAZOBACTAM 3.38 G: 3; .375 INJECTION, POWDER, LYOPHILIZED, FOR SOLUTION INTRAVENOUS at 09:11

## 2019-09-10 RX ADMIN — ACETAMINOPHEN 650 MG: 325 TABLET ORAL at 20:51

## 2019-09-10 ASSESSMENT — PAIN DESCRIPTION - PAIN TYPE
TYPE: CHRONIC PAIN
TYPE: CHRONIC PAIN

## 2019-09-10 ASSESSMENT — PAIN SCALES - GENERAL
PAINLEVEL_OUTOF10: 3
PAINLEVEL_OUTOF10: 3
PAINLEVEL_OUTOF10: 6

## 2019-09-10 ASSESSMENT — PAIN DESCRIPTION - LOCATION
LOCATION: GENERALIZED
LOCATION: GENERALIZED

## 2019-09-10 NOTE — PROGRESS NOTES
GI  - PROGRESS NOTE    Subjective:   Admit Date: 9/6/2019  PCP: Aman Corrigan MD    CC: External hemorrhoids, dysphagia    Pt seen and examined. Pt is resting in her bed with the NG tube in place. No acute events overnight. Medications:  Scheduled Meds:   lidocaine 1 % injection  5 mL Intradermal Once    sodium chloride flush  10 mL Intravenous 2 times per day    pantoprazole  40 mg Intravenous Daily    And    sodium chloride (PF)  10 mL Intravenous Daily    piperacillin-tazobactam  3.375 g Intravenous Q8H    iron sucrose  200 mg Intravenous Q24H    sodium chloride  250 mL Intravenous Once    sodium chloride  250 mL Intravenous Once       Continuous Infusions:   dextrose 100 mL/hr (09/08/19 0514)    IV infusion builder 150 mL/hr at 09/09/19 2244       PRN Meds:.potassium chloride **OR** potassium alternative oral replacement **OR** potassium chloride, magnesium sulfate, sodium chloride flush, glucose, dextrose, glucagon (rDNA), dextrose, acetaminophen, ondansetron    Allergies: Patient has no known allergies. Labs:     Recent Labs     09/08/19  0242 09/10/19  0053 09/10/19  0439   WBC 19.3* 21.3* 19.9*   RBC 3.43* 3.48* 3.20*   HGB 8.8* 9.0* 8.3*   HCT 28.3* 27.6* 25.9*   MCV 82.5 79.3* 80.9*   MCH 25.7* 25.9* 25.9*   MCHC 31.1* 32.6* 32.0*    248 243     Recent Labs     09/08/19  0242 09/10/19  0053 09/10/19  0439   * 151* 151*   K 3.1* 4.1 3.3*   ANIONGAP 14 10 9   * 118* 118*   CO2 22 23 24   BUN 50* 41* 39*   CREATININE 1.4* 1.2* 1.2*   GLUCOSE 38* 72* 83   CALCIUM 7.1* 7.2* 7.0*     Recent Labs     09/10/19  0053 09/10/19  0439   MG 2.1 2.1     Recent Labs     09/10/19  0053   AST 94*   ALT 58*   BILITOT 0.6   ALKPHOS 281*     HgBA1c:  No components found for: HGBA1C  FLP:  No results found for: TRIG, HDL, LDLCALC, LDLDIRECT, LABVLDL  TSH:  No results found for: TSH  Troponin T: No results for input(s): TROPONINI in the last 72 hours.   INR:   No results for

## 2019-09-11 VITALS
DIASTOLIC BLOOD PRESSURE: 53 MMHG | BODY MASS INDEX: 35.02 KG/M2 | OXYGEN SATURATION: 96 % | SYSTOLIC BLOOD PRESSURE: 84 MMHG | WEIGHT: 210.2 LBS | HEART RATE: 76 BPM | HEIGHT: 65 IN | RESPIRATION RATE: 16 BRPM | TEMPERATURE: 97.8 F

## 2019-09-11 LAB
ALBUMIN SERPL-MCNC: 1.2 G/DL (ref 3.5–5.2)
ALP BLD-CCNC: 313 U/L (ref 35–104)
ALT SERPL-CCNC: 62 U/L (ref 5–33)
ANION GAP SERPL CALCULATED.3IONS-SCNC: 10 MMOL/L (ref 7–19)
ANION GAP SERPL CALCULATED.3IONS-SCNC: 11 MMOL/L (ref 7–19)
AST SERPL-CCNC: 83 U/L (ref 5–32)
BASOPHILS ABSOLUTE: 0.1 K/UL (ref 0–0.2)
BASOPHILS RELATIVE PERCENT: 0.3 % (ref 0–1)
BILIRUB SERPL-MCNC: 0.7 MG/DL (ref 0.2–1.2)
BUN BLDV-MCNC: 36 MG/DL (ref 8–23)
BUN BLDV-MCNC: 37 MG/DL (ref 8–23)
CALCIUM SERPL-MCNC: 7 MG/DL (ref 8.8–10.2)
CALCIUM SERPL-MCNC: 7.1 MG/DL (ref 8.8–10.2)
CHLORIDE BLD-SCNC: 114 MMOL/L (ref 98–111)
CHLORIDE BLD-SCNC: 115 MMOL/L (ref 98–111)
CO2: 21 MMOL/L (ref 22–29)
CO2: 24 MMOL/L (ref 22–29)
CREAT SERPL-MCNC: 1.2 MG/DL (ref 0.5–0.9)
CREAT SERPL-MCNC: 1.2 MG/DL (ref 0.5–0.9)
EOSINOPHILS ABSOLUTE: 0.4 K/UL (ref 0–0.6)
EOSINOPHILS RELATIVE PERCENT: 1.4 % (ref 0–5)
GFR NON-AFRICAN AMERICAN: 44
GFR NON-AFRICAN AMERICAN: 44
GLUCOSE BLD-MCNC: 161 MG/DL (ref 74–109)
GLUCOSE BLD-MCNC: 183 MG/DL (ref 70–99)
GLUCOSE BLD-MCNC: 228 MG/DL (ref 70–99)
GLUCOSE BLD-MCNC: 229 MG/DL (ref 74–109)
HCT VFR BLD CALC: 28.4 % (ref 37–47)
HEMOGLOBIN: 9 G/DL (ref 12–16)
IMMATURE GRANULOCYTES #: 1.8 K/UL
LYMPHOCYTES ABSOLUTE: 2.7 K/UL (ref 1.1–4.5)
LYMPHOCYTES RELATIVE PERCENT: 11.1 % (ref 20–40)
MCH RBC QN AUTO: 25.8 PG (ref 27–31)
MCHC RBC AUTO-ENTMCNC: 31.7 G/DL (ref 33–37)
MCV RBC AUTO: 81.4 FL (ref 81–99)
MONOCYTES ABSOLUTE: 1.1 K/UL (ref 0–0.9)
MONOCYTES RELATIVE PERCENT: 4.4 % (ref 0–10)
NEUTROPHILS ABSOLUTE: 18.6 K/UL (ref 1.5–7.5)
NEUTROPHILS RELATIVE PERCENT: 75.7 % (ref 50–65)
PDW BLD-RTO: 21.4 % (ref 11.5–14.5)
PERFORMED ON: ABNORMAL
PERFORMED ON: ABNORMAL
PLATELET # BLD: 261 K/UL (ref 130–400)
PMV BLD AUTO: 9.4 FL (ref 9.4–12.3)
POTASSIUM REFLEX MAGNESIUM: 4.8 MMOL/L (ref 3.5–5)
POTASSIUM SERPL-SCNC: 3.8 MMOL/L (ref 3.5–5)
RBC # BLD: 3.49 M/UL (ref 4.2–5.4)
SODIUM BLD-SCNC: 145 MMOL/L (ref 136–145)
SODIUM BLD-SCNC: 150 MMOL/L (ref 136–145)
TOTAL PROTEIN: 4.8 G/DL (ref 6.6–8.7)
WBC # BLD: 24.6 K/UL (ref 4.8–10.8)

## 2019-09-11 PROCEDURE — 6360000002 HC RX W HCPCS: Performed by: INTERNAL MEDICINE

## 2019-09-11 PROCEDURE — 2580000003 HC RX 258: Performed by: FAMILY MEDICINE

## 2019-09-11 PROCEDURE — 2580000003 HC RX 258: Performed by: INTERNAL MEDICINE

## 2019-09-11 PROCEDURE — 36415 COLL VENOUS BLD VENIPUNCTURE: CPT

## 2019-09-11 PROCEDURE — 85025 COMPLETE CBC W/AUTO DIFF WBC: CPT

## 2019-09-11 PROCEDURE — 82948 REAGENT STRIP/BLOOD GLUCOSE: CPT

## 2019-09-11 PROCEDURE — 2580000003 HC RX 258: Performed by: HOSPITALIST

## 2019-09-11 PROCEDURE — C9113 INJ PANTOPRAZOLE SODIUM, VIA: HCPCS | Performed by: INTERNAL MEDICINE

## 2019-09-11 PROCEDURE — 6370000000 HC RX 637 (ALT 250 FOR IP): Performed by: INTERNAL MEDICINE

## 2019-09-11 PROCEDURE — 6360000002 HC RX W HCPCS: Performed by: FAMILY MEDICINE

## 2019-09-11 PROCEDURE — 6360000002 HC RX W HCPCS: Performed by: HOSPITALIST

## 2019-09-11 PROCEDURE — 80053 COMPREHEN METABOLIC PANEL: CPT

## 2019-09-11 RX ADMIN — Medication 10 ML: at 08:02

## 2019-09-11 RX ADMIN — CALCIUM GLUCONATE 1 G: 98 INJECTION, SOLUTION INTRAVENOUS at 08:02

## 2019-09-11 RX ADMIN — PIPERACILLIN AND TAZOBACTAM 3.38 G: 3; .375 INJECTION, POWDER, LYOPHILIZED, FOR SOLUTION INTRAVENOUS at 09:23

## 2019-09-11 RX ADMIN — PIPERACILLIN AND TAZOBACTAM 3.38 G: 3; .375 INJECTION, POWDER, LYOPHILIZED, FOR SOLUTION INTRAVENOUS at 00:20

## 2019-09-11 RX ADMIN — PANTOPRAZOLE SODIUM 40 MG: 40 INJECTION, POWDER, FOR SOLUTION INTRAVENOUS at 08:02

## 2019-09-11 RX ADMIN — ACETAMINOPHEN 650 MG: 325 TABLET ORAL at 06:21

## 2019-09-11 RX ADMIN — Medication 10 ML: at 08:03

## 2019-09-11 ASSESSMENT — PAIN SCALES - GENERAL
PAINLEVEL_OUTOF10: 1
PAINLEVEL_OUTOF10: 3

## 2019-09-11 ASSESSMENT — PAIN DESCRIPTION - LOCATION: LOCATION: GENERALIZED

## 2019-09-11 ASSESSMENT — PAIN DESCRIPTION - PAIN TYPE: TYPE: CHRONIC PAIN

## 2019-09-11 NOTE — PROGRESS NOTES
Speech Language Pathology  Facility/Department: [facility] SURG SERVICES    NAME: Robert Guzman  : 1945  MRN: [id number]    Per progress note documentation, patient family has decided upon hospice services. No further acute speech therapy is felt to be warranted.     Electronically signed by WONG Harris on 2019 at 12:55 PM

## 2019-09-11 NOTE — PROGRESS NOTES
Nephrology ([address]) Progress Note      Patient:  Yamil Macias  YOB: 1945  Date of Service: 9/11/2019  MRN: [id number]   Acct: [de-identified]   Primary Care Physician: Nolberto Garnett MD  Advance Directive: DNR-CC  Admit Date: 9/6/2019       Hospital Day: 5  Referring Provider: Joshua Dumont MD    Patient independently seen and examined, Chart, Consults, Notes, Operative notes, Labs, Cardiology, and Radiology studies reviewed as able. Chief complaint: Abnormal labs. Subjective:  Yamil Macias is a 69 y.o. female  whom we were consulted for acute kidney injury/hypernatremia and hypokalemia. Patient denies any history of chronic kidney disease. She has history of dementia and currently a nursing home resident. She is has been on Xarelto for the treatment of  deep vein thrombosis. She from nursing home as she was found to have bright red blood per rectum. He has history of dementia, currently DNR. Hospital course remarkable for acute kidney injury and hypernatremia, nephrology is consulted. Patient is currently receiving IV fluid and has shown some improvement of electrolyte and renal function. This morning family is at the bedside, they are leaning towards possible hospice care. Allergies:  Patient has no known allergies.     Medicines:  Current Facility-Administered Medications   Medication Dose Route Frequency Provider Last Rate Last Dose    potassium chloride (KLOR-CON M) extended release tablet 40 mEq  40 mEq Oral PRN Chayo Myles MD        Or    potassium bicarb-citric acid (EFFER-K) effervescent tablet 40 mEq  40 mEq Oral PRN Chayo Myles MD        Or    potassium chloride 10 mEq/100 mL IVPB (Peripheral Line)  10 mEq Intravenous PRN Chayo Myles MD        magnesium sulfate 1 g in dextrose 5% 100 mL IVPB  1 g Intravenous PRN Chayo Myles MD        lidocaine PF 1 % injection 5 mL  5 mL Intradermal Once Joann Browne MD        sodium chloride flush 0.9 % injection 10 mL  10 mL Intravenous 2 times per day Telma Portillo MD   10 mL at 09/11/19 0803    sodium chloride flush 0.9 % injection 10 mL  10 mL Intravenous PRN Telma Portillo MD        glucose (GLUTOSE) 40 % oral gel 15 g  15 g Oral PRN Vaughn Valadez MD        dextrose 50 % IV solution  12.5 g Intravenous PRN Vaughn Valadez MD   12.5 g at 09/08/19 0456    glucagon (rDNA) injection 1 mg  1 mg Intramuscular PRN Vaughn Valadez MD        dextrose 5 % solution  100 mL/hr Intravenous PRN Vaughn Valadez  mL/hr at 09/08/19 0514 100 mL/hr at 09/08/19 0514    potassium chloride 40 mEq in dextrose 5 % 1,000 mL infusion   Intravenous Continuous Jaclyn Lynne  mL/hr at 09/11/19 0803      pantoprazole (PROTONIX) injection 40 mg  40 mg Intravenous Daily Alexandra Brand DO   40 mg at 09/11/19 5343    And    sodium chloride (PF) 0.9 % injection 10 mL  10 mL Intravenous Daily Alexandra Brand DO   10 mL at 09/11/19 0802    piperacillin-tazobactam (ZOSYN) 3.375 g in dextrose 5 % 50 mL IVPB extended infusion (mini-bag)  3.375 g Intravenous Q8H Telma Portillo MD 12.5 mL/hr at 09/11/19 0923 3.375 g at 09/11/19 2689    0.9 % sodium chloride bolus  250 mL Intravenous Once Alexandra Brand DO        0.9 % sodium chloride bolus  250 mL Intravenous Once Alexandra Brand DO        acetaminophen (TYLENOL) tablet 650 mg  650 mg Oral Q4H PRN Alexandra Brand DO   650 mg at 09/11/19 3923    ondansetron (ZOFRAN) injection 4 mg  4 mg Intravenous Q6H PRN Alexandra Brand DO           Past Medical History:  Past Medical History:   Diagnosis Date    Anemia     [facility]est[facility] heart failure) ([facility] [address]     Chronic pruritus     Colon polyps     Depression     D[facility]deep venous thrombosis) ([facility])     Failure to thrive in adult     Hypokalemia     Muscle weakness     Palliative care patient 09/09/2019    Pressure ulcer     Pulmonary embolism (HCC)     Shortness of breath     Venous obtain. Objective:  Patient Vitals for the past 24 hrs:   BP Temp Temp src Pulse Resp SpO2   09/11/19 0720 (!) 84/53 97.8 °F (36.6 °C) Temporal 76 16 96 %   09/11/19 0359 94/60 97.9 °F (36.6 °C) Temporal 102 20 96 %   09/10/19 2320 (!) 92/57 97.8 °F (36.6 °C) Temporal 100 16 97 %   09/10/19 1910 (!) 80/53 98.9 °F (37.2 °C) Temporal 104 18 95 %   09/10/19 1523 (!) 86/53 99.3 °F (37.4 °C) Temporal 72 14 96 %       Intake/Output Summary (Last 24 hours) at 9/11/2019 1145  Last data filed at 9/11/2019 0603  Gross per 24 hour   Intake 3181.17 ml   Output 550 ml   Net 2631.17 ml     General: awake/alert  x2  HEENT: Normocephalic atraumatic head  Neck: Supple with no JVD. Chest:  clear to auscultation bilaterally without respiratory distress  CVS: regular rate and rhythm  Abdominal: soft, nontender, normal bowel sounds  Extremities: no cyanosis or edema  Skin: warm and dry without rash      Labs:  BMP:   Recent Labs     09/10/19  0053 09/10/19  0439 09/11/19  0431   * 151* 150*   K 4.1 3.3* 3.8   * 118* 115*   CO2 23 24 24   BUN 41* 39* 36*   CREATININE 1.2* 1.2* 1.2*   CALCIUM 7.2* 7.0* 7.0*     CBC:   Recent Labs     09/10/19  0053 09/10/19  0439 09/10/19  1121 09/11/19 0431   WBC 21.3* 19.9*  --  24.6*   HGB 9.0* 8.3* 9.1* 9.0*   HCT 27.6* 25.9*  --  28.4*   MCV 79.3* 80.9*  --  81.4    243  --  261     LIVER PROFILE:   Recent Labs     09/10/19  0053 09/11/19  0431   AST 94* 83*   ALT 58* 62*   BILITOT 0.6 0.7   ALKPHOS 281* 313*     PT/INR: No results for input(s): PROTIME, INR in the last 72 hours. APTT: No results for input(s): APTT in the last 72 hours. BNP:  No results for input(s): BNP in the last 72 hours. Ionized Calcium:No results for input(s): IONCA in the last 72 hours. Magnesium:  Recent Labs     09/10/19  0053 09/10/19  0439   MG 2.1 2.1     Phosphorus:No results for input(s): PHOS in the last 72 hours. HgbA1C: No results for input(s): LABA1C in the last 72 hours.   Hepatic: Recent Labs     09/10/19  0053 09/11/19  0431   ALKPHOS 281* 313*   ALT 58* 62*   AST 94* 83*   PROT 4.7* 4.8*   BILITOT 0.6 0.7   LABALBU 1.3* 1.2*     Lactic Acid: No results for input(s): LACTA in the last 72 hours. Troponin: No results for input(s): CKTOTAL, CKMB, TROPONINT in the last 72 hours. ABGs: No results for input(s): PH, PCO2, PO2, HCO3, O2SAT in the last 72 hours. CRP:  No results for input(s): CRP in the last 72 hours. Sed Rate:  No results for input(s): SEDRATE in the last 72 hours. Cultures:   No results for input(s): CULTURE in the last 72 hours. No results for input(s): BC, Rachelle Nova in the last 72 hours. No results for input(s): CXSURG in the last 72 hours. Radiology reports as per the Radiologist  Radiology: No results found. Assessment   1. Acute kidney injury/prerenal azotemia. 2.  HYPERNATREMIA  3. Dementia. 4.  Lower GI bleed. 5.  Anemia secondary to GI bleed. 6.  Failure to thrive. Plan:  1. Increase IV fluid D5W with potassium chloride. 2.  Transfuse as needed. 3.  H2O via feeding tube.   4.  Poor prognosis discussed with family      Lucas Childs MD  09/11/19  11:45 AM

## 2019-09-11 NOTE — DISCHARGE SUMMARY
breathing  Heart: S1, S2 normal  Abdomen: soft, NT  Extremities: edema+  Neurologic: no focal neurologic deficits, normal sensation, alert and oriented, affect and mood appropriate  Skin: no jaundice, rashes, or nodules    Discharge Medications:       Paty Ace   Home Medication Instructions OZI:[id number]    Printed on:09/11/19 8660   Medication Information                      aspirin 81 MG EC tablet  Take 81 mg by mouth             bumetanide (BUMEX) 1 MG tablet  Take 1 mg by mouth             buPROPion (WELLBUTRIN XL) 150 MG extended release tablet  Take 150 mg by mouth             citalopram (CELEXA) 10 MG tablet  Take 10 mg by mouth             cyproheptadine (PERIACTIN) 4 MG tablet  Take 4 mg by mouth             donepezil (ARICEPT) 10 MG tablet  Take 10 mg by mouth             hydrocortisone (ANUSOL-HC) 2.5 % rectal cream  Place rectally 3 times daily as needed for Hemorrhoids Place rectally 2 times daily. pantoprazole (PROTONIX) 40 MG tablet  Take 40 mg by mouth             rivaroxaban (XARELTO) 20 MG TABS tablet  Take 20 mg by mouth             traMADol (ULTRAM) 50 MG tablet  Take 50 mg by mouth. Discharge Instructions:   Hospice placement    Diet: As per hospice    Disposition: Discharge to hospice. Time spent on discharge 35 minutes.     Signed:  Patrice Adams MD 9/11/2019 3:31 PM

## 2019-09-11 NOTE — PROGRESS NOTES
The pts son Julio Chowdhury signed the adm forms admitting the pt to the [place] [id number].. It is ok to dc the pt, call [phone] to give report then transfer the pt to the [place] [address].. Emotional and sc support provided.

## 2019-09-11 NOTE — PROGRESS NOTES
[facility]        Hospitalist Progress Note  9/11/2019 11:23 AM  Subjective:   Admit Date: 9/6/2019  PCP: Doris Alarcon MD    Chief Complaint: BRBPR    Subjective: Patient seen and examined at bedside with NGT in place. Family (daughter-in-law, 2 sons) at bedside. Patient minimally responsive today. Patient agreeable for hospice placement. [facility] History: Patient presented for BRBPR. GI consulted with medical management decided. Patient failing swallow screen, unable to get PICC for TPN. Patient to go to hospice. ROS: 14 point review of systems is negative except as specifically addressed above. DIET TUBE FEED CONTINUOUS/CYCLIC NPO; Semi-elemental (Vital AF 1.2);  Nasogastric; Continuous; 10; 70; 24; Exceptions are: Ice Chips    Intake/Output Summary (Last 24 hours) at 9/11/2019 1123  Last data filed at 9/11/2019 0603  Gross per 24 hour   Intake 3181.17 ml   Output 550 ml   Net 2631.17 ml     Medications:   dextrose 100 mL/hr (09/08/19 0514)    IV infusion builder 200 mL/hr at 09/11/19 0803     Current Facility-Administered Medications   Medication Dose Route Frequency Provider Last Rate Last Dose    potassium chloride (KLOR-CON M) extended release tablet 40 mEq  40 mEq Oral PRN Franco Vital MD        Or    potassium bicarb-citric acid (EFFER-K) effervescent tablet 40 mEq  40 mEq Oral PRN Franco Vital MD        Or    potassium chloride 10 mEq/100 mL IVPB (Peripheral Line)  10 mEq Intravenous PRN Franco Vital MD        magnesium sulfate 1 g in dextrose 5% 100 mL IVPB  1 g Intravenous PRN Franco Vital MD        lidocaine PF 1 % injection 5 mL  5 mL Intradermal Once Guy Rubin MD        sodium chloride flush 0.9 % injection 10 mL  10 mL Intravenous 2 times per day Guy Rubin MD   10 mL at 09/11/19 0803    sodium chloride flush 0.9 % injection 10 mL  10 mL Intravenous PRN Guy Rubin MD        glucose (GLUTOSE) 40 % oral gel 15 g  15 g Oral PRN Johnita Line

## 2019-09-11 NOTE — PROGRESS NOTES
24 hour chart check complete.     Electronically signed by Victor Manuel Lambert RN on 9/11/2019 at 1:14 AM

## 2019-09-13 PROBLEM — D64.9 ANEMIA: Status: ACTIVE | Noted: 2019-09-13

## 2019-09-13 PROBLEM — G30.9 ALZHEIMER'S DEMENTIA WITHOUT BEHAVIORAL DISTURBANCE (HCC): Chronic | Status: ACTIVE | Noted: 2019-09-13

## 2019-09-13 PROBLEM — F32.9 CURRENT EPISODE OF MAJOR DEPRESSIVE DISORDER WITHOUT PRIOR EPISODE: Status: ACTIVE | Noted: 2019-09-13

## 2019-09-13 PROBLEM — R62.51 FAILURE TO THRIVE (0-17): Status: ACTIVE | Noted: 2019-09-13

## 2019-09-13 PROBLEM — F02.80 ALZHEIMER'S DEMENTIA WITHOUT BEHAVIORAL DISTURBANCE (HCC): Chronic | Status: ACTIVE | Noted: 2019-09-13

## 2019-09-13 PROBLEM — K92.2 GASTROINTESTINAL HEMORRHAGE: Status: ACTIVE | Noted: 2019-09-13

## 2019-09-13 PROBLEM — N18.30 CKD (CHRONIC KIDNEY DISEASE) STAGE 3, GFR 30-59 ML/MIN (HCC): Chronic | Status: ACTIVE | Noted: 2019-09-13

## 2022-09-26 ENCOUNTER — TELEPHONE (OUTPATIENT)
Dept: GASTROENTEROLOGY | Facility: CLINIC | Age: 77
End: 2022-09-26

## 2022-09-26 NOTE — TELEPHONE ENCOUNTER
I have sent 2 letters to pt re: recall colon and have had no response. I called and spoke to her son, Leland, about it today. He tells me pt passed away in 2018 or 2019. I am removing her from my recall list. The , Britany, was able to find the obituary and pt passed away 9/17/2019-I have marked that in her chart.

## (undated) DEVICE — CUFF,BP,DISP,1 TUBE,ADULT,HP: Brand: MEDLINE

## (undated) DEVICE — TBG SMPL FLTR LINE NASL 02/C02 A/ BX/100

## (undated) DEVICE — ENDOGATOR AUXILIARY WATER JET CONNECTOR: Brand: ENDOGATOR

## (undated) DEVICE — THE CHANNEL CLEANING BRUSH IS A NYLON FLEXI BRUSH ATTACHED TO A FLEXIBLE PLASTIC SHEATH DESIGNED TO SAFELY REMOVE DEBRIS FROM FLEXIBLE ENDOSCOPES.

## (undated) DEVICE — SENSR O2 OXIMAX FNGR A/ 18IN NONSTR

## (undated) DEVICE — MSK O2 MD CONCENTR A/ LF 7FT 1P/U

## (undated) DEVICE — Device: Brand: DEFENDO AIR/WATER/SUCTION AND BIOPSY VALVE

## (undated) DEVICE — CONMED SCOPE SAVER BITE BLOCK, 20X27 MM: Brand: SCOPE SAVER